# Patient Record
Sex: MALE | Race: BLACK OR AFRICAN AMERICAN | NOT HISPANIC OR LATINO | URBAN - METROPOLITAN AREA
[De-identification: names, ages, dates, MRNs, and addresses within clinical notes are randomized per-mention and may not be internally consistent; named-entity substitution may affect disease eponyms.]

---

## 2017-04-08 ENCOUNTER — EMERGENCY (EMERGENCY)
Facility: HOSPITAL | Age: 62
LOS: 1 days | Discharge: HOME | End: 2017-04-08
Attending: EMERGENCY MEDICINE | Admitting: EMERGENCY MEDICINE
Payer: COMMERCIAL

## 2017-04-08 VITALS
HEART RATE: 56 BPM | SYSTOLIC BLOOD PRESSURE: 185 MMHG | DIASTOLIC BLOOD PRESSURE: 98 MMHG | WEIGHT: 274.92 LBS | TEMPERATURE: 98 F | HEIGHT: 74 IN | OXYGEN SATURATION: 100 % | RESPIRATION RATE: 18 BRPM

## 2017-04-08 DIAGNOSIS — Z94.0 KIDNEY TRANSPLANT STATUS: ICD-10-CM

## 2017-04-08 DIAGNOSIS — Z79.82 LONG TERM (CURRENT) USE OF ASPIRIN: ICD-10-CM

## 2017-04-08 DIAGNOSIS — M79.89 OTHER SPECIFIED SOFT TISSUE DISORDERS: ICD-10-CM

## 2017-04-08 DIAGNOSIS — Z98.890 OTHER SPECIFIED POSTPROCEDURAL STATES: ICD-10-CM

## 2017-04-08 DIAGNOSIS — Z79.899 OTHER LONG TERM (CURRENT) DRUG THERAPY: ICD-10-CM

## 2017-04-08 DIAGNOSIS — Z94.0 KIDNEY TRANSPLANT STATUS: Chronic | ICD-10-CM

## 2017-04-08 DIAGNOSIS — I12.0 HYPERTENSIVE CHRONIC KIDNEY DISEASE WITH STAGE 5 CHRONIC KIDNEY DISEASE OR END STAGE RENAL DISEASE: ICD-10-CM

## 2017-04-08 DIAGNOSIS — E11.9 TYPE 2 DIABETES MELLITUS WITHOUT COMPLICATIONS: ICD-10-CM

## 2017-04-08 DIAGNOSIS — Z98.89 OTHER SPECIFIED POSTPROCEDURAL STATES: Chronic | ICD-10-CM

## 2017-04-08 DIAGNOSIS — N18.6 END STAGE RENAL DISEASE: ICD-10-CM

## 2017-04-08 DIAGNOSIS — Z79.4 LONG TERM (CURRENT) USE OF INSULIN: ICD-10-CM

## 2017-04-08 PROCEDURE — 99284 EMERGENCY DEPT VISIT MOD MDM: CPT | Mod: 25

## 2017-04-08 NOTE — ED ADULT NURSE NOTE - OBJECTIVE STATEMENT
60 y/o male presenting to the ED for "swelling of right arm fistula"; Patient states "fistula has not been used in 6 years due to receiving a kidney transplant in 2012"; Patient states swelling of fistula began months ago but noticed a dramatic increase in size tonight; Fistula appears swollen; positive bruit; Patient denies pain at site; patient denies injury to arm, increase in heavy lifting; Patient hx of blindness in right eye and partial blindness in left eye; Steady gait noted upon walking in; Neuro grossly intact; a&ox3; safety and comfort measures provided; wife at bedside

## 2017-04-08 NOTE — ED ADULT NURSE NOTE - CHIEF COMPLAINT QUOTE
right arm av fistula appears with more swelling than usual; had kidney transplant 2011 and no longer uses fistula; no pain; pt is legally blind

## 2017-04-08 NOTE — ED ADULT NURSE NOTE - PMH
Bell's palsy  2004  Benign prostatic hypertrophy    Diabetic neuropathy associated with type 2 diabetes mellitus    Dyslipidemia    ESRD (end stage renal disease)  s/p renal transplant  Hypertension    Legally blind  blind in right eye and limited vision in left eye  Obesity    Type 2 diabetes mellitus

## 2017-04-08 NOTE — ED ADULT TRIAGE NOTE - CHIEF COMPLAINT QUOTE
right arm av fistula appears with more swelling than usual; had kidney transplant 2011 and no longer uses fistula; no pain; pt is legally blind right arm av fistula appears with more swelling than usual; no thrill; had kidney transplant 2011 and no longer uses fistula; no pain; pt is legally blind

## 2017-04-09 VITALS
OXYGEN SATURATION: 96 % | DIASTOLIC BLOOD PRESSURE: 93 MMHG | TEMPERATURE: 98 F | SYSTOLIC BLOOD PRESSURE: 184 MMHG | HEART RATE: 50 BPM | RESPIRATION RATE: 18 BRPM

## 2017-04-09 LAB
ALBUMIN SERPL ELPH-MCNC: 4 G/DL — SIGNIFICANT CHANGE UP (ref 3.3–5)
ALP SERPL-CCNC: 70 U/L — SIGNIFICANT CHANGE UP (ref 40–120)
ALT FLD-CCNC: 15 U/L RC — SIGNIFICANT CHANGE UP (ref 10–45)
ANION GAP SERPL CALC-SCNC: 11 MMOL/L — SIGNIFICANT CHANGE UP (ref 5–17)
APTT BLD: 29.4 SEC — SIGNIFICANT CHANGE UP (ref 27.5–37.4)
AST SERPL-CCNC: 29 U/L — SIGNIFICANT CHANGE UP (ref 10–40)
BASOPHILS # BLD AUTO: 0 K/UL — SIGNIFICANT CHANGE UP (ref 0–0.2)
BASOPHILS NFR BLD AUTO: 0.4 % — SIGNIFICANT CHANGE UP (ref 0–2)
BILIRUB SERPL-MCNC: 0.2 MG/DL — SIGNIFICANT CHANGE UP (ref 0.2–1.2)
BUN SERPL-MCNC: 22 MG/DL — SIGNIFICANT CHANGE UP (ref 7–23)
CALCIUM SERPL-MCNC: 7.6 MG/DL — LOW (ref 8.4–10.5)
CHLORIDE SERPL-SCNC: 107 MMOL/L — SIGNIFICANT CHANGE UP (ref 96–108)
CO2 SERPL-SCNC: 23 MMOL/L — SIGNIFICANT CHANGE UP (ref 22–31)
CREAT SERPL-MCNC: 1.6 MG/DL — HIGH (ref 0.5–1.3)
EOSINOPHIL # BLD AUTO: 0.1 K/UL — SIGNIFICANT CHANGE UP (ref 0–0.5)
EOSINOPHIL NFR BLD AUTO: 2.6 % — SIGNIFICANT CHANGE UP (ref 0–6)
GLUCOSE SERPL-MCNC: 199 MG/DL — HIGH (ref 70–99)
HCT VFR BLD CALC: 42.2 % — SIGNIFICANT CHANGE UP (ref 39–50)
HGB BLD-MCNC: 12.9 G/DL — LOW (ref 13–17)
INR BLD: 1.07 RATIO — SIGNIFICANT CHANGE UP (ref 0.88–1.16)
LYMPHOCYTES # BLD AUTO: 1 K/UL — SIGNIFICANT CHANGE UP (ref 1–3.3)
LYMPHOCYTES # BLD AUTO: 22.9 % — SIGNIFICANT CHANGE UP (ref 13–44)
MCHC RBC-ENTMCNC: 23.1 PG — LOW (ref 27–34)
MCHC RBC-ENTMCNC: 30.6 GM/DL — LOW (ref 32–36)
MCV RBC AUTO: 75.3 FL — LOW (ref 80–100)
MONOCYTES # BLD AUTO: 0.5 K/UL — SIGNIFICANT CHANGE UP (ref 0–0.9)
MONOCYTES NFR BLD AUTO: 10 % — SIGNIFICANT CHANGE UP (ref 2–14)
NEUTROPHILS # BLD AUTO: 2.9 K/UL — SIGNIFICANT CHANGE UP (ref 1.8–7.4)
NEUTROPHILS NFR BLD AUTO: 64.1 % — SIGNIFICANT CHANGE UP (ref 43–77)
PLATELET # BLD AUTO: 148 K/UL — LOW (ref 150–400)
POTASSIUM SERPL-MCNC: 5.9 MMOL/L — HIGH (ref 3.5–5.3)
POTASSIUM SERPL-SCNC: 5.9 MMOL/L — HIGH (ref 3.5–5.3)
PROT SERPL-MCNC: 6.5 G/DL — SIGNIFICANT CHANGE UP (ref 6–8.3)
PROTHROM AB SERPL-ACNC: 11.7 SEC — SIGNIFICANT CHANGE UP (ref 9.8–12.7)
RBC # BLD: 5.6 M/UL — SIGNIFICANT CHANGE UP (ref 4.2–5.8)
RBC # FLD: 14.4 % — SIGNIFICANT CHANGE UP (ref 10.3–14.5)
SODIUM SERPL-SCNC: 141 MMOL/L — SIGNIFICANT CHANGE UP (ref 135–145)
WBC # BLD: 4.6 K/UL — SIGNIFICANT CHANGE UP (ref 3.8–10.5)
WBC # FLD AUTO: 4.6 K/UL — SIGNIFICANT CHANGE UP (ref 3.8–10.5)

## 2017-04-09 PROCEDURE — 85610 PROTHROMBIN TIME: CPT

## 2017-04-09 PROCEDURE — 99284 EMERGENCY DEPT VISIT MOD MDM: CPT

## 2017-04-09 PROCEDURE — 85730 THROMBOPLASTIN TIME PARTIAL: CPT

## 2017-04-09 PROCEDURE — 85027 COMPLETE CBC AUTOMATED: CPT

## 2017-04-09 PROCEDURE — 80053 COMPREHEN METABOLIC PANEL: CPT

## 2017-04-09 NOTE — ED PROVIDER NOTE - OBJECTIVE STATEMENT
60yo M with fistula not active, last HD 6 years ago, s/p renal transplant 2011. feels that fistula swollen 62yo M with fistula not active, last HD 6 years ago, s/p renal transplant 2011. feels that fistula swollen x2 years, and significantly swollen today. no fever/chills. no weakness in arm. no paresthias. no pain in the arm. swelling extending behind elbow. +firm    ROS: no CP/SOB. no cough. no n/v/d/c. no abd pain. no rash. no bleeding. no urinary complaints. no weakness. no vision changes. no HA. no neck/back pain.     PCP- Dr Perla

## 2017-04-09 NOTE — ED PROVIDER NOTE - PROGRESS NOTE DETAILS
area of swelling on arm has not increased outside drawn line during ED visit. Surgical consult states that since patient is not actively undergoing access of fistula and swelling not increased and patient has no other bleeding he is ok to f/u out patient with out vasc surgery or private surgeon. Patients in bed and comfortable. Lengthy discussion regarding treatment, discharge instructions, and need for follow up. Patient understands and wishes to go home. HELDER Calhoun MD

## 2017-04-09 NOTE — ED PROVIDER NOTE - PHYSICAL EXAMINATION
Gen: NAD, AOx3  Head: NCAT  HEENT: PERRL, oral mucosa moist, normal conjunctiva, neck supple  Lung: CTAB, no respiratory distress  CV: rrr, no murmur, Normal perfusion  Abd: soft, NTND  MSK: firm AV fistula rt UE extending into antecubital fossa, no erythema/warmth. no thrill, +2 radial pulse  Neuro: No focal neurologic deficits  Skin: No rash   Psych: normal affect

## 2017-04-09 NOTE — ED PROVIDER NOTE - ATTENDING CONTRIBUTION TO CARE
patient with swelling of dialysis fistula over 2 mos and increased swelling today. last accessed 6 years prior as patient had renal transplant. no signs of bleeding. not on blood thinners. soft compartments. sensation and neurovascularly intact throughout arm.   plan for vasc surgery consult, reassess.

## 2017-04-11 ENCOUNTER — APPOINTMENT (OUTPATIENT)
Age: 62
End: 2017-04-11

## 2017-04-11 DIAGNOSIS — I72.1 ANEURYSM OF ARTERY OF UPPER EXTREMITY: ICD-10-CM

## 2017-04-12 ENCOUNTER — RESULT REVIEW (OUTPATIENT)
Age: 62
End: 2017-04-12

## 2017-04-12 ENCOUNTER — INPATIENT (INPATIENT)
Facility: HOSPITAL | Age: 62
LOS: 1 days | Discharge: HOME | DRG: 253 | End: 2017-04-14
Attending: SURGERY | Admitting: SURGERY
Payer: COMMERCIAL

## 2017-04-12 VITALS
OXYGEN SATURATION: 100 % | SYSTOLIC BLOOD PRESSURE: 184 MMHG | RESPIRATION RATE: 18 BRPM | DIASTOLIC BLOOD PRESSURE: 93 MMHG | TEMPERATURE: 98 F | HEART RATE: 59 BPM

## 2017-04-12 DIAGNOSIS — Z98.89 OTHER SPECIFIED POSTPROCEDURAL STATES: Chronic | ICD-10-CM

## 2017-04-12 DIAGNOSIS — T82.858A STENOSIS OF OTHER VASCULAR PROSTHETIC DEVICES, IMPLANTS AND GRAFTS, INITIAL ENCOUNTER: ICD-10-CM

## 2017-04-12 DIAGNOSIS — Z94.0 KIDNEY TRANSPLANT STATUS: Chronic | ICD-10-CM

## 2017-04-12 LAB
ALBUMIN SERPL ELPH-MCNC: 3.9 G/DL — SIGNIFICANT CHANGE UP (ref 3.3–5)
ALP SERPL-CCNC: 69 U/L — SIGNIFICANT CHANGE UP (ref 40–120)
ALT FLD-CCNC: 14 U/L RC — SIGNIFICANT CHANGE UP (ref 10–45)
ANION GAP SERPL CALC-SCNC: 15 MMOL/L — SIGNIFICANT CHANGE UP (ref 5–17)
APTT BLD: 28.9 SEC — SIGNIFICANT CHANGE UP (ref 27.5–37.4)
AST SERPL-CCNC: 16 U/L — SIGNIFICANT CHANGE UP (ref 10–40)
BASOPHILS # BLD AUTO: 0 K/UL — SIGNIFICANT CHANGE UP (ref 0–0.2)
BASOPHILS NFR BLD AUTO: 0.4 % — SIGNIFICANT CHANGE UP (ref 0–2)
BILIRUB SERPL-MCNC: 0.3 MG/DL — SIGNIFICANT CHANGE UP (ref 0.2–1.2)
BLD GP AB SCN SERPL QL: NEGATIVE — SIGNIFICANT CHANGE UP
BUN SERPL-MCNC: 27 MG/DL — HIGH (ref 7–23)
CALCIUM SERPL-MCNC: 8.9 MG/DL — SIGNIFICANT CHANGE UP (ref 8.4–10.5)
CHLORIDE SERPL-SCNC: 107 MMOL/L — SIGNIFICANT CHANGE UP (ref 96–108)
CO2 SERPL-SCNC: 22 MMOL/L — SIGNIFICANT CHANGE UP (ref 22–31)
CREAT SERPL-MCNC: 1.79 MG/DL — HIGH (ref 0.5–1.3)
EOSINOPHIL # BLD AUTO: 0.1 K/UL — SIGNIFICANT CHANGE UP (ref 0–0.5)
EOSINOPHIL NFR BLD AUTO: 2.2 % — SIGNIFICANT CHANGE UP (ref 0–6)
GAS PNL BLDV: SIGNIFICANT CHANGE UP
GLUCOSE SERPL-MCNC: 218 MG/DL — HIGH (ref 70–99)
HCT VFR BLD CALC: 38.5 % — LOW (ref 39–50)
HGB BLD-MCNC: 12 G/DL — LOW (ref 13–17)
INR BLD: 1.12 RATIO — SIGNIFICANT CHANGE UP (ref 0.88–1.16)
LYMPHOCYTES # BLD AUTO: 0.9 K/UL — LOW (ref 1–3.3)
LYMPHOCYTES # BLD AUTO: 17.4 % — SIGNIFICANT CHANGE UP (ref 13–44)
MCHC RBC-ENTMCNC: 23.3 PG — LOW (ref 27–34)
MCHC RBC-ENTMCNC: 31.2 GM/DL — LOW (ref 32–36)
MCV RBC AUTO: 74.7 FL — LOW (ref 80–100)
MONOCYTES # BLD AUTO: 0.6 K/UL — SIGNIFICANT CHANGE UP (ref 0–0.9)
MONOCYTES NFR BLD AUTO: 10.9 % — SIGNIFICANT CHANGE UP (ref 2–14)
NEUTROPHILS # BLD AUTO: 3.8 K/UL — SIGNIFICANT CHANGE UP (ref 1.8–7.4)
NEUTROPHILS NFR BLD AUTO: 69.1 % — SIGNIFICANT CHANGE UP (ref 43–77)
PLATELET # BLD AUTO: 144 K/UL — LOW (ref 150–400)
POTASSIUM SERPL-MCNC: 4.2 MMOL/L — SIGNIFICANT CHANGE UP (ref 3.5–5.3)
POTASSIUM SERPL-SCNC: 4.2 MMOL/L — SIGNIFICANT CHANGE UP (ref 3.5–5.3)
PROT SERPL-MCNC: 6.3 G/DL — SIGNIFICANT CHANGE UP (ref 6–8.3)
PROTHROM AB SERPL-ACNC: 12.1 SEC — SIGNIFICANT CHANGE UP (ref 9.8–12.7)
RBC # BLD: 5.15 M/UL — SIGNIFICANT CHANGE UP (ref 4.2–5.8)
RBC # FLD: 14 % — SIGNIFICANT CHANGE UP (ref 10.3–14.5)
RH IG SCN BLD-IMP: POSITIVE — SIGNIFICANT CHANGE UP
SODIUM SERPL-SCNC: 144 MMOL/L — SIGNIFICANT CHANGE UP (ref 135–145)
WBC # BLD: 5.4 K/UL — SIGNIFICANT CHANGE UP (ref 3.8–10.5)
WBC # FLD AUTO: 5.4 K/UL — SIGNIFICANT CHANGE UP (ref 3.8–10.5)

## 2017-04-12 PROCEDURE — 93010 ELECTROCARDIOGRAM REPORT: CPT

## 2017-04-12 PROCEDURE — 71010: CPT | Mod: 26

## 2017-04-12 PROCEDURE — 99285 EMERGENCY DEPT VISIT HI MDM: CPT | Mod: 25

## 2017-04-12 RX ORDER — LOSARTAN POTASSIUM 100 MG/1
25 TABLET, FILM COATED ORAL DAILY
Qty: 0 | Refills: 0 | Status: DISCONTINUED | OUTPATIENT
Start: 2017-04-12 | End: 2017-04-13

## 2017-04-12 RX ORDER — NIFEDIPINE 30 MG
60 TABLET, EXTENDED RELEASE 24 HR ORAL DAILY
Qty: 0 | Refills: 0 | Status: DISCONTINUED | OUTPATIENT
Start: 2017-04-12 | End: 2017-04-13

## 2017-04-12 RX ORDER — DEXTROSE 50 % IN WATER 50 %
12.5 SYRINGE (ML) INTRAVENOUS ONCE
Qty: 0 | Refills: 0 | Status: DISCONTINUED | OUTPATIENT
Start: 2017-04-12 | End: 2017-04-13

## 2017-04-12 RX ORDER — INSULIN LISPRO 100/ML
VIAL (ML) SUBCUTANEOUS
Qty: 0 | Refills: 0 | Status: DISCONTINUED | OUTPATIENT
Start: 2017-04-12 | End: 2017-04-13

## 2017-04-12 RX ORDER — TAMSULOSIN HYDROCHLORIDE 0.4 MG/1
0.4 CAPSULE ORAL
Qty: 0 | Refills: 0 | Status: DISCONTINUED | OUTPATIENT
Start: 2017-04-12 | End: 2017-04-13

## 2017-04-12 RX ORDER — SODIUM CHLORIDE 9 MG/ML
1000 INJECTION, SOLUTION INTRAVENOUS
Qty: 0 | Refills: 0 | Status: DISCONTINUED | OUTPATIENT
Start: 2017-04-12 | End: 2017-04-13

## 2017-04-12 RX ORDER — SODIUM CHLORIDE 9 MG/ML
1000 INJECTION INTRAMUSCULAR; INTRAVENOUS; SUBCUTANEOUS
Qty: 0 | Refills: 0 | Status: DISCONTINUED | OUTPATIENT
Start: 2017-04-12 | End: 2017-04-13

## 2017-04-12 RX ORDER — CALCIUM CARBONATE 500(1250)
1 TABLET ORAL
Qty: 0 | Refills: 0 | Status: DISCONTINUED | OUTPATIENT
Start: 2017-04-12 | End: 2017-04-13

## 2017-04-12 RX ORDER — DEXTROSE 50 % IN WATER 50 %
25 SYRINGE (ML) INTRAVENOUS ONCE
Qty: 0 | Refills: 0 | Status: DISCONTINUED | OUTPATIENT
Start: 2017-04-12 | End: 2017-04-13

## 2017-04-12 RX ORDER — INSULIN DETEMIR 100/ML (3)
10 INSULIN PEN (ML) SUBCUTANEOUS AT BEDTIME
Qty: 0 | Refills: 0 | Status: DISCONTINUED | OUTPATIENT
Start: 2017-04-12 | End: 2017-04-13

## 2017-04-12 RX ORDER — DEXTROSE 50 % IN WATER 50 %
1 SYRINGE (ML) INTRAVENOUS ONCE
Qty: 0 | Refills: 0 | Status: DISCONTINUED | OUTPATIENT
Start: 2017-04-12 | End: 2017-04-13

## 2017-04-12 RX ORDER — METOPROLOL TARTRATE 50 MG
200 TABLET ORAL
Qty: 0 | Refills: 0 | Status: DISCONTINUED | OUTPATIENT
Start: 2017-04-12 | End: 2017-04-13

## 2017-04-12 RX ORDER — DOCUSATE SODIUM 100 MG
100 CAPSULE ORAL
Qty: 0 | Refills: 0 | Status: DISCONTINUED | OUTPATIENT
Start: 2017-04-12 | End: 2017-04-13

## 2017-04-12 RX ORDER — INSULIN LISPRO 100/ML
VIAL (ML) SUBCUTANEOUS AT BEDTIME
Qty: 0 | Refills: 0 | Status: DISCONTINUED | OUTPATIENT
Start: 2017-04-12 | End: 2017-04-13

## 2017-04-12 RX ORDER — MYCOPHENOLATE MOFETIL 250 MG/1
250 CAPSULE ORAL
Qty: 0 | Refills: 0 | Status: DISCONTINUED | OUTPATIENT
Start: 2017-04-12 | End: 2017-04-13

## 2017-04-12 RX ORDER — ASPIRIN/CALCIUM CARB/MAGNESIUM 324 MG
81 TABLET ORAL DAILY
Qty: 0 | Refills: 0 | Status: DISCONTINUED | OUTPATIENT
Start: 2017-04-12 | End: 2017-04-13

## 2017-04-12 RX ORDER — GLUCAGON INJECTION, SOLUTION 0.5 MG/.1ML
1 INJECTION, SOLUTION SUBCUTANEOUS ONCE
Qty: 0 | Refills: 0 | Status: DISCONTINUED | OUTPATIENT
Start: 2017-04-12 | End: 2017-04-13

## 2017-04-12 RX ORDER — HEPARIN SODIUM 5000 [USP'U]/ML
5000 INJECTION INTRAVENOUS; SUBCUTANEOUS EVERY 8 HOURS
Qty: 0 | Refills: 0 | Status: DISCONTINUED | OUTPATIENT
Start: 2017-04-12 | End: 2017-04-13

## 2017-04-12 RX ORDER — TACROLIMUS 5 MG/1
4 CAPSULE ORAL EVERY 12 HOURS
Qty: 0 | Refills: 0 | Status: DISCONTINUED | OUTPATIENT
Start: 2017-04-12 | End: 2017-04-13

## 2017-04-12 RX ADMIN — Medication 1 TABLET(S): at 18:36

## 2017-04-12 RX ADMIN — Medication 1: at 15:25

## 2017-04-12 RX ADMIN — LOSARTAN POTASSIUM 25 MILLIGRAM(S): 100 TABLET, FILM COATED ORAL at 14:08

## 2017-04-12 RX ADMIN — Medication 10 UNIT(S): at 22:51

## 2017-04-12 RX ADMIN — HEPARIN SODIUM 5000 UNIT(S): 5000 INJECTION INTRAVENOUS; SUBCUTANEOUS at 22:49

## 2017-04-12 RX ADMIN — MYCOPHENOLATE MOFETIL 250 MILLIGRAM(S): 250 CAPSULE ORAL at 22:49

## 2017-04-12 RX ADMIN — TACROLIMUS 4 MILLIGRAM(S): 5 CAPSULE ORAL at 22:50

## 2017-04-12 RX ADMIN — Medication 2: at 22:49

## 2017-04-12 RX ADMIN — TAMSULOSIN HYDROCHLORIDE 0.4 MILLIGRAM(S): 0.4 CAPSULE ORAL at 18:36

## 2017-04-12 RX ADMIN — HEPARIN SODIUM 5000 UNIT(S): 5000 INJECTION INTRAVENOUS; SUBCUTANEOUS at 14:13

## 2017-04-12 RX ADMIN — Medication 60 MILLIGRAM(S): at 16:20

## 2017-04-12 RX ADMIN — Medication 100 MILLIGRAM(S): at 18:38

## 2017-04-12 NOTE — PROVIDER CONTACT NOTE (OTHER) - BACKGROUND
Patient admitted 4/12/17 with AV Patient admitted 4/12/17 with AV fistula bp is 175/78 and HR is 55. Patient admitted 4/12/17 with AV fistula swelling. bp is 175/78 and HR is 55.

## 2017-04-12 NOTE — ED PROVIDER NOTE - MUSCULOSKELETAL, MLM
Spine appears normal, range of motion is not limited, no muscle or joint tenderness Spine appears normal, range of motion is not limited, no muscle or joint tenderness Right forearm has diffuse proximally swelling and adjacent to AV fistula Bruit+ no distal neuro vascular deficit-- Fernando

## 2017-04-12 NOTE — ED ADULT NURSE NOTE - OBJECTIVE STATEMENT
60 y/o M, reported to ED from home. A&Ox3, c/o right arm fistula repair. Pt reports that he had a kidney transplant in 2011 and hasn't used the fistula since. Pt reports that he noticed some swelling in the arm for a long time." Pt states that he was here on Saturday night after the swelling extended to his elbow. Pt reports that he was in the ED yesterday and had an ultrasound of the area. Pt was told that the fistula was leaking blood and that he was to come back today to be admitted to the hospital. Pt states that he will have a procedure preformed tomorrow as per his previous ED visit. Pt denies pain at the site. The site is swollen and hard to touch. No redness or warmth noted at the site of the fistula. Pt denies LOC, SOB, C/P, N/V/D, abd pain. Pt denies fever or chills. Pt reports some constipation and hx of legally blind, DM and HTN. Wife at bedside, will continue to monitor pt.

## 2017-04-12 NOTE — PROVIDER CONTACT NOTE (OTHER) - ASSESSMENT
Patient a&ox4. Patient /78 and HR is 55. Patient 98% on room air. Patient denies chest pain or shortness of breath

## 2017-04-12 NOTE — PATIENT PROFILE ADULT. - VISION (WITH CORRECTIVE LENSES IF THE PATIENT USUALLY WEARS THEM):
legally blind/Severely impaired: cannot locate objects without hearing or touching them or patient nonresponsive.

## 2017-04-12 NOTE — H&P ADULT. - ASSESSMENT
61M with RUE brachial pseudoaneurysm  -TTE  -NPO past midnight  -Pre-op  -IVF  -OR on 4/13 for repair of pseudoaneurysm

## 2017-04-12 NOTE — ED PROVIDER NOTE - OBJECTIVE STATEMENT
62 yo m with history of renal tx on cellcept and prograf, HTN, DM presents for admission for repair of right A-V fistula. The patient's AV fistula hasn't been used since 2011 and recently the arm has been swelling up and has had an US which showed an arterial blockage. The patient reports that the swelling has gone down. He does not have any complaints at this time.

## 2017-04-12 NOTE — H&P ADULT. - HISTORY OF PRESENT ILLNESS
61 year old Male presented to ED on 4/8 for increased swelling of 1 week in RUE at site of AVF. Pt was evaluated and told to follow up with Dr. Cardoso. Pt followed up at office and was scheduled for repair of pseudoaneurysm on 4/13 and presented to hospital on 4/12 for preo-op.

## 2017-04-12 NOTE — ED PROVIDER NOTE - ATTENDING CONTRIBUTION TO CARE
I have seen and evaluated this patient with the resident.   I agree with the findings  unless other wise stated.  I have made appropriate changes in documentations where needed, After my face to face bedside evaluation, I am further  noting: Right arm AV fistula for hemodialysis not being used 2/2 renal transplant formation of pseudo aneurysm will admit for repair --Fernando

## 2017-04-12 NOTE — ED PROVIDER NOTE - MEDICAL DECISION MAKING DETAILS
Right arm AV fistula for hemodialysis not being used 2/2 renal transplant formation of pseudo aneurysm will admit for repair --Fernando

## 2017-04-13 ENCOUNTER — APPOINTMENT (OUTPATIENT)
Dept: VASCULAR SURGERY | Facility: HOSPITAL | Age: 62
End: 2017-04-13

## 2017-04-13 LAB
ANION GAP SERPL CALC-SCNC: 13 MMOL/L — SIGNIFICANT CHANGE UP (ref 5–17)
BLD GP AB SCN SERPL QL: NEGATIVE — SIGNIFICANT CHANGE UP
BUN SERPL-MCNC: 20 MG/DL — SIGNIFICANT CHANGE UP (ref 7–23)
CALCIUM SERPL-MCNC: 8.2 MG/DL — LOW (ref 8.4–10.5)
CHLORIDE SERPL-SCNC: 108 MMOL/L — SIGNIFICANT CHANGE UP (ref 96–108)
CO2 SERPL-SCNC: 20 MMOL/L — LOW (ref 22–31)
CREAT SERPL-MCNC: 1.47 MG/DL — HIGH (ref 0.5–1.3)
GLUCOSE SERPL-MCNC: 173 MG/DL — HIGH (ref 70–99)
HBA1C BLD-MCNC: 7.8 % — HIGH (ref 4–5.6)
HCT VFR BLD CALC: 30.5 % — LOW (ref 39–50)
HGB BLD-MCNC: 9.8 G/DL — LOW (ref 13–17)
MCHC RBC-ENTMCNC: 23.9 PG — LOW (ref 27–34)
MCHC RBC-ENTMCNC: 32.2 GM/DL — SIGNIFICANT CHANGE UP (ref 32–36)
MCV RBC AUTO: 74.1 FL — LOW (ref 80–100)
PLATELET # BLD AUTO: 115 K/UL — LOW (ref 150–400)
POTASSIUM SERPL-MCNC: 3.7 MMOL/L — SIGNIFICANT CHANGE UP (ref 3.5–5.3)
POTASSIUM SERPL-SCNC: 3.7 MMOL/L — SIGNIFICANT CHANGE UP (ref 3.5–5.3)
RBC # BLD: 4.12 M/UL — LOW (ref 4.2–5.8)
RBC # FLD: 13.9 % — SIGNIFICANT CHANGE UP (ref 10.3–14.5)
RH IG SCN BLD-IMP: POSITIVE — SIGNIFICANT CHANGE UP
SODIUM SERPL-SCNC: 141 MMOL/L — SIGNIFICANT CHANGE UP (ref 135–145)
TACROLIMUS SERPL-MCNC: 5.4 NG/ML — SIGNIFICANT CHANGE UP
WBC # BLD: 2.9 K/UL — LOW (ref 3.8–10.5)
WBC # FLD AUTO: 2.9 K/UL — LOW (ref 3.8–10.5)

## 2017-04-13 PROCEDURE — 88304 TISSUE EXAM BY PATHOLOGIST: CPT | Mod: 26

## 2017-04-13 PROCEDURE — 93306 TTE W/DOPPLER COMPLETE: CPT | Mod: 26

## 2017-04-13 RX ORDER — LOSARTAN POTASSIUM 100 MG/1
25 TABLET, FILM COATED ORAL DAILY
Qty: 0 | Refills: 0 | Status: DISCONTINUED | OUTPATIENT
Start: 2017-04-13 | End: 2017-04-14

## 2017-04-13 RX ORDER — INSULIN LISPRO 100/ML
VIAL (ML) SUBCUTANEOUS
Qty: 0 | Refills: 0 | Status: DISCONTINUED | OUTPATIENT
Start: 2017-04-13 | End: 2017-04-14

## 2017-04-13 RX ORDER — METOPROLOL TARTRATE 50 MG
200 TABLET ORAL
Qty: 0 | Refills: 0 | Status: DISCONTINUED | OUTPATIENT
Start: 2017-04-13 | End: 2017-04-14

## 2017-04-13 RX ORDER — MYCOPHENOLATE MOFETIL 250 MG/1
250 CAPSULE ORAL
Qty: 0 | Refills: 0 | Status: DISCONTINUED | OUTPATIENT
Start: 2017-04-13 | End: 2017-04-14

## 2017-04-13 RX ORDER — DEXTROSE 50 % IN WATER 50 %
25 SYRINGE (ML) INTRAVENOUS ONCE
Qty: 0 | Refills: 0 | Status: DISCONTINUED | OUTPATIENT
Start: 2017-04-13 | End: 2017-04-14

## 2017-04-13 RX ORDER — DEXTROSE 50 % IN WATER 50 %
1 SYRINGE (ML) INTRAVENOUS ONCE
Qty: 0 | Refills: 0 | Status: DISCONTINUED | OUTPATIENT
Start: 2017-04-13 | End: 2017-04-13

## 2017-04-13 RX ORDER — INSULIN LISPRO 100/ML
VIAL (ML) SUBCUTANEOUS AT BEDTIME
Qty: 0 | Refills: 0 | Status: DISCONTINUED | OUTPATIENT
Start: 2017-04-13 | End: 2017-04-13

## 2017-04-13 RX ORDER — TACROLIMUS 5 MG/1
4 CAPSULE ORAL EVERY 12 HOURS
Qty: 0 | Refills: 0 | Status: DISCONTINUED | OUTPATIENT
Start: 2017-04-13 | End: 2017-04-14

## 2017-04-13 RX ORDER — INSULIN GLARGINE 100 [IU]/ML
10 INJECTION, SOLUTION SUBCUTANEOUS AT BEDTIME
Qty: 0 | Refills: 0 | Status: DISCONTINUED | OUTPATIENT
Start: 2017-04-13 | End: 2017-04-13

## 2017-04-13 RX ORDER — DEXTROSE 50 % IN WATER 50 %
12.5 SYRINGE (ML) INTRAVENOUS ONCE
Qty: 0 | Refills: 0 | Status: DISCONTINUED | OUTPATIENT
Start: 2017-04-13 | End: 2017-04-13

## 2017-04-13 RX ORDER — DEXTROSE 50 % IN WATER 50 %
25 SYRINGE (ML) INTRAVENOUS ONCE
Qty: 0 | Refills: 0 | Status: DISCONTINUED | OUTPATIENT
Start: 2017-04-13 | End: 2017-04-13

## 2017-04-13 RX ORDER — INSULIN GLARGINE 100 [IU]/ML
10 INJECTION, SOLUTION SUBCUTANEOUS AT BEDTIME
Qty: 0 | Refills: 0 | Status: DISCONTINUED | OUTPATIENT
Start: 2017-04-13 | End: 2017-04-14

## 2017-04-13 RX ORDER — INSULIN LISPRO 100/ML
VIAL (ML) SUBCUTANEOUS
Qty: 0 | Refills: 0 | Status: DISCONTINUED | OUTPATIENT
Start: 2017-04-13 | End: 2017-04-13

## 2017-04-13 RX ORDER — TAMSULOSIN HYDROCHLORIDE 0.4 MG/1
0.4 CAPSULE ORAL
Qty: 0 | Refills: 0 | Status: DISCONTINUED | OUTPATIENT
Start: 2017-04-13 | End: 2017-04-14

## 2017-04-13 RX ORDER — SODIUM CHLORIDE 9 MG/ML
1000 INJECTION, SOLUTION INTRAVENOUS
Qty: 0 | Refills: 0 | Status: DISCONTINUED | OUTPATIENT
Start: 2017-04-13 | End: 2017-04-14

## 2017-04-13 RX ORDER — DEXTROSE 50 % IN WATER 50 %
1 SYRINGE (ML) INTRAVENOUS ONCE
Qty: 0 | Refills: 0 | Status: DISCONTINUED | OUTPATIENT
Start: 2017-04-13 | End: 2017-04-14

## 2017-04-13 RX ORDER — GLUCAGON INJECTION, SOLUTION 0.5 MG/.1ML
1 INJECTION, SOLUTION SUBCUTANEOUS ONCE
Qty: 0 | Refills: 0 | Status: DISCONTINUED | OUTPATIENT
Start: 2017-04-13 | End: 2017-04-14

## 2017-04-13 RX ORDER — HEPARIN SODIUM 5000 [USP'U]/ML
5000 INJECTION INTRAVENOUS; SUBCUTANEOUS EVERY 8 HOURS
Qty: 0 | Refills: 0 | Status: DISCONTINUED | OUTPATIENT
Start: 2017-04-13 | End: 2017-04-14

## 2017-04-13 RX ORDER — INSULIN LISPRO 100/ML
VIAL (ML) SUBCUTANEOUS AT BEDTIME
Qty: 0 | Refills: 0 | Status: DISCONTINUED | OUTPATIENT
Start: 2017-04-13 | End: 2017-04-14

## 2017-04-13 RX ORDER — NIFEDIPINE 30 MG
60 TABLET, EXTENDED RELEASE 24 HR ORAL DAILY
Qty: 0 | Refills: 0 | Status: DISCONTINUED | OUTPATIENT
Start: 2017-04-13 | End: 2017-04-14

## 2017-04-13 RX ORDER — SODIUM CHLORIDE 9 MG/ML
1000 INJECTION INTRAMUSCULAR; INTRAVENOUS; SUBCUTANEOUS
Qty: 0 | Refills: 0 | Status: DISCONTINUED | OUTPATIENT
Start: 2017-04-13 | End: 2017-04-13

## 2017-04-13 RX ORDER — INSULIN DETEMIR 100/ML (3)
10 INSULIN PEN (ML) SUBCUTANEOUS AT BEDTIME
Qty: 0 | Refills: 0 | Status: DISCONTINUED | OUTPATIENT
Start: 2017-04-13 | End: 2017-04-13

## 2017-04-13 RX ORDER — DOCUSATE SODIUM 100 MG
100 CAPSULE ORAL
Qty: 0 | Refills: 0 | Status: DISCONTINUED | OUTPATIENT
Start: 2017-04-13 | End: 2017-04-14

## 2017-04-13 RX ORDER — DEXTROSE 50 % IN WATER 50 %
12.5 SYRINGE (ML) INTRAVENOUS ONCE
Qty: 0 | Refills: 0 | Status: DISCONTINUED | OUTPATIENT
Start: 2017-04-13 | End: 2017-04-14

## 2017-04-13 RX ORDER — GLUCAGON INJECTION, SOLUTION 0.5 MG/.1ML
1 INJECTION, SOLUTION SUBCUTANEOUS ONCE
Qty: 0 | Refills: 0 | Status: DISCONTINUED | OUTPATIENT
Start: 2017-04-13 | End: 2017-04-13

## 2017-04-13 RX ORDER — CALCIUM CARBONATE 500(1250)
1 TABLET ORAL
Qty: 0 | Refills: 0 | Status: DISCONTINUED | OUTPATIENT
Start: 2017-04-13 | End: 2017-04-14

## 2017-04-13 RX ORDER — ASPIRIN/CALCIUM CARB/MAGNESIUM 324 MG
81 TABLET ORAL DAILY
Qty: 0 | Refills: 0 | Status: DISCONTINUED | OUTPATIENT
Start: 2017-04-13 | End: 2017-04-14

## 2017-04-13 RX ADMIN — HEPARIN SODIUM 5000 UNIT(S): 5000 INJECTION INTRAVENOUS; SUBCUTANEOUS at 21:24

## 2017-04-13 RX ADMIN — Medication 1 TABLET(S): at 18:23

## 2017-04-13 RX ADMIN — Medication 200 MILLIGRAM(S): at 18:22

## 2017-04-13 RX ADMIN — Medication 5 MILLIGRAM(S): at 06:39

## 2017-04-13 RX ADMIN — SODIUM CHLORIDE 30 MILLILITER(S): 9 INJECTION INTRAMUSCULAR; INTRAVENOUS; SUBCUTANEOUS at 16:39

## 2017-04-13 RX ADMIN — TAMSULOSIN HYDROCHLORIDE 0.4 MILLIGRAM(S): 0.4 CAPSULE ORAL at 18:24

## 2017-04-13 RX ADMIN — TACROLIMUS 4 MILLIGRAM(S): 5 CAPSULE ORAL at 18:22

## 2017-04-13 RX ADMIN — TAMSULOSIN HYDROCHLORIDE 0.4 MILLIGRAM(S): 0.4 CAPSULE ORAL at 06:39

## 2017-04-13 RX ADMIN — INSULIN GLARGINE 10 UNIT(S): 100 INJECTION, SOLUTION SUBCUTANEOUS at 21:24

## 2017-04-13 RX ADMIN — MYCOPHENOLATE MOFETIL 250 MILLIGRAM(S): 250 CAPSULE ORAL at 10:45

## 2017-04-13 RX ADMIN — Medication 100 MILLIGRAM(S): at 18:22

## 2017-04-13 RX ADMIN — LOSARTAN POTASSIUM 25 MILLIGRAM(S): 100 TABLET, FILM COATED ORAL at 06:39

## 2017-04-13 RX ADMIN — MYCOPHENOLATE MOFETIL 250 MILLIGRAM(S): 250 CAPSULE ORAL at 18:22

## 2017-04-13 RX ADMIN — HEPARIN SODIUM 5000 UNIT(S): 5000 INJECTION INTRAVENOUS; SUBCUTANEOUS at 06:39

## 2017-04-13 RX ADMIN — Medication 2: at 21:25

## 2017-04-13 RX ADMIN — Medication 2: at 18:23

## 2017-04-13 RX ADMIN — Medication 81 MILLIGRAM(S): at 18:23

## 2017-04-13 RX ADMIN — LOSARTAN POTASSIUM 25 MILLIGRAM(S): 100 TABLET, FILM COATED ORAL at 18:22

## 2017-04-13 RX ADMIN — Medication 1: at 06:39

## 2017-04-13 RX ADMIN — Medication 1 TABLET(S): at 06:39

## 2017-04-13 RX ADMIN — Medication 100 MILLIGRAM(S): at 06:39

## 2017-04-14 ENCOUNTER — TRANSCRIPTION ENCOUNTER (OUTPATIENT)
Age: 62
End: 2017-04-14

## 2017-04-14 VITALS
OXYGEN SATURATION: 99 % | DIASTOLIC BLOOD PRESSURE: 86 MMHG | HEART RATE: 55 BPM | TEMPERATURE: 97 F | SYSTOLIC BLOOD PRESSURE: 148 MMHG | RESPIRATION RATE: 18 BRPM

## 2017-04-14 LAB
ANION GAP SERPL CALC-SCNC: 13 MMOL/L — SIGNIFICANT CHANGE UP (ref 5–17)
BUN SERPL-MCNC: 24 MG/DL — HIGH (ref 7–23)
CALCIUM SERPL-MCNC: 9 MG/DL — SIGNIFICANT CHANGE UP (ref 8.4–10.5)
CHLORIDE SERPL-SCNC: 102 MMOL/L — SIGNIFICANT CHANGE UP (ref 96–108)
CO2 SERPL-SCNC: 23 MMOL/L — SIGNIFICANT CHANGE UP (ref 22–31)
CREAT SERPL-MCNC: 1.68 MG/DL — HIGH (ref 0.5–1.3)
GLUCOSE SERPL-MCNC: 269 MG/DL — HIGH (ref 70–99)
HCT VFR BLD CALC: 39.2 % — SIGNIFICANT CHANGE UP (ref 39–50)
HGB BLD-MCNC: 12.1 G/DL — LOW (ref 13–17)
MCHC RBC-ENTMCNC: 22.9 PG — LOW (ref 27–34)
MCHC RBC-ENTMCNC: 30.9 GM/DL — LOW (ref 32–36)
MCV RBC AUTO: 74.1 FL — LOW (ref 80–100)
PLATELET # BLD AUTO: 149 K/UL — LOW (ref 150–400)
POTASSIUM SERPL-MCNC: 4.4 MMOL/L — SIGNIFICANT CHANGE UP (ref 3.5–5.3)
POTASSIUM SERPL-SCNC: 4.4 MMOL/L — SIGNIFICANT CHANGE UP (ref 3.5–5.3)
RBC # BLD: 5.28 M/UL — SIGNIFICANT CHANGE UP (ref 4.2–5.8)
RBC # FLD: 13.9 % — SIGNIFICANT CHANGE UP (ref 10.3–14.5)
SODIUM SERPL-SCNC: 138 MMOL/L — SIGNIFICANT CHANGE UP (ref 135–145)
TACROLIMUS SERPL-MCNC: 5 NG/ML — SIGNIFICANT CHANGE UP
WBC # BLD: 5.6 K/UL — SIGNIFICANT CHANGE UP (ref 3.8–10.5)
WBC # FLD AUTO: 5.6 K/UL — SIGNIFICANT CHANGE UP (ref 3.8–10.5)

## 2017-04-14 PROCEDURE — 82803 BLOOD GASES ANY COMBINATION: CPT

## 2017-04-14 PROCEDURE — 71045 X-RAY EXAM CHEST 1 VIEW: CPT

## 2017-04-14 PROCEDURE — 84132 ASSAY OF SERUM POTASSIUM: CPT

## 2017-04-14 PROCEDURE — C1768: CPT

## 2017-04-14 PROCEDURE — 80197 ASSAY OF TACROLIMUS: CPT

## 2017-04-14 PROCEDURE — 86900 BLOOD TYPING SEROLOGIC ABO: CPT

## 2017-04-14 PROCEDURE — C1757: CPT

## 2017-04-14 PROCEDURE — 86850 RBC ANTIBODY SCREEN: CPT

## 2017-04-14 PROCEDURE — 85730 THROMBOPLASTIN TIME PARTIAL: CPT

## 2017-04-14 PROCEDURE — 82947 ASSAY GLUCOSE BLOOD QUANT: CPT

## 2017-04-14 PROCEDURE — 93306 TTE W/DOPPLER COMPLETE: CPT

## 2017-04-14 PROCEDURE — 83036 HEMOGLOBIN GLYCOSYLATED A1C: CPT

## 2017-04-14 PROCEDURE — 80053 COMPREHEN METABOLIC PANEL: CPT

## 2017-04-14 PROCEDURE — 99285 EMERGENCY DEPT VISIT HI MDM: CPT | Mod: 25

## 2017-04-14 PROCEDURE — 80048 BASIC METABOLIC PNL TOTAL CA: CPT

## 2017-04-14 PROCEDURE — 82435 ASSAY OF BLOOD CHLORIDE: CPT

## 2017-04-14 PROCEDURE — 84295 ASSAY OF SERUM SODIUM: CPT

## 2017-04-14 PROCEDURE — 83605 ASSAY OF LACTIC ACID: CPT

## 2017-04-14 PROCEDURE — 82330 ASSAY OF CALCIUM: CPT

## 2017-04-14 PROCEDURE — 85027 COMPLETE CBC AUTOMATED: CPT

## 2017-04-14 PROCEDURE — 93005 ELECTROCARDIOGRAM TRACING: CPT

## 2017-04-14 PROCEDURE — 88304 TISSUE EXAM BY PATHOLOGIST: CPT

## 2017-04-14 PROCEDURE — 85014 HEMATOCRIT: CPT

## 2017-04-14 PROCEDURE — C1889: CPT

## 2017-04-14 PROCEDURE — 85610 PROTHROMBIN TIME: CPT

## 2017-04-14 PROCEDURE — 86901 BLOOD TYPING SEROLOGIC RH(D): CPT

## 2017-04-14 RX ORDER — OXYCODONE HYDROCHLORIDE 5 MG/1
1 TABLET ORAL
Qty: 12 | Refills: 0 | OUTPATIENT
Start: 2017-04-14 | End: 2017-04-17

## 2017-04-14 RX ORDER — FUROSEMIDE 40 MG
20 TABLET ORAL
Qty: 0 | Refills: 0 | Status: DISCONTINUED | OUTPATIENT
Start: 2017-04-14 | End: 2017-04-14

## 2017-04-14 RX ORDER — TAMSULOSIN HYDROCHLORIDE 0.4 MG/1
1 CAPSULE ORAL
Qty: 0 | Refills: 0 | COMMUNITY
Start: 2017-04-14

## 2017-04-14 RX ADMIN — Medication 81 MILLIGRAM(S): at 12:02

## 2017-04-14 RX ADMIN — Medication 8: at 12:01

## 2017-04-14 RX ADMIN — MYCOPHENOLATE MOFETIL 250 MILLIGRAM(S): 250 CAPSULE ORAL at 05:23

## 2017-04-14 RX ADMIN — Medication 2: at 16:47

## 2017-04-14 RX ADMIN — Medication 200 MILLIGRAM(S): at 08:45

## 2017-04-14 RX ADMIN — Medication 100 MILLIGRAM(S): at 05:22

## 2017-04-14 RX ADMIN — HEPARIN SODIUM 5000 UNIT(S): 5000 INJECTION INTRAVENOUS; SUBCUTANEOUS at 05:22

## 2017-04-14 RX ADMIN — Medication 4: at 07:59

## 2017-04-14 RX ADMIN — Medication 1 TABLET(S): at 05:22

## 2017-04-14 RX ADMIN — TAMSULOSIN HYDROCHLORIDE 0.4 MILLIGRAM(S): 0.4 CAPSULE ORAL at 05:23

## 2017-04-14 RX ADMIN — LOSARTAN POTASSIUM 25 MILLIGRAM(S): 100 TABLET, FILM COATED ORAL at 05:22

## 2017-04-14 RX ADMIN — Medication 60 MILLIGRAM(S): at 08:45

## 2017-04-14 RX ADMIN — TACROLIMUS 4 MILLIGRAM(S): 5 CAPSULE ORAL at 10:28

## 2017-04-14 RX ADMIN — Medication 5 MILLIGRAM(S): at 05:23

## 2017-04-14 NOTE — DISCHARGE NOTE ADULT - PLAN OF CARE
Please call for follow-up appointment with  Dr. Cardoso in Healing Please call for follow-up appointment with Dr. Hurley at his office on Monday 4/17. You may call [phone] to schedule an appointment. Please call for follow-up appointment with Dr. Hurley at his office on Monday 4/17. You may call [phone] to schedule an appointment.    Wound care is dry gauze with tape

## 2017-04-14 NOTE — DISCHARGE NOTE ADULT - CARE PLAN
Instructions for follow-up, activity and diet:	Please call for follow-up appointment with  Dr. Cardoso in Principal Discharge DX:	Pseudoaneurysm  Goal:	Healing  Instructions for follow-up, activity and diet:	Please call for follow-up appointment with Dr. Hurlye at his office on Monday 4/17. You may call [phone] to schedule an appointment. Principal Discharge DX:	Pseudoaneurysm  Goal:	Healing  Instructions for follow-up, activity and diet:	Please call for follow-up appointment with Dr. Hurley at his office on Monday 4/17. You may call [phone] to schedule an appointment. Principal Discharge DX:	Pseudoaneurysm  Goal:	Healing  Instructions for follow-up, activity and diet:	Please call for follow-up appointment with Dr. Hurley at his office on Monday 4/17. You may call [phone] to schedule an appointment.    Wound care is dry gauze with tape Principal Discharge DX:	Pseudoaneurysm  Goal:	Healing  Instructions for follow-up, activity and diet:	Please call for follow-up appointment with Dr. Hurley at his office on Monday 4/17. You may call [phone] to schedule an appointment.    Wound care is dry gauze with tape Principal Discharge DX:	Pseudoaneurysm  Goal:	Healing  Instructions for follow-up, activity and diet:	Please call for follow-up appointment with Dr. Hurley at his office on Monday 4/17. You may call [phone] to schedule an appointment.    Wound care is dry gauze with tape Principal Discharge DX:	Pseudoaneurysm  Goal:	Healing  Instructions for follow-up, activity and diet:	Please call for follow-up appointment with Dr. Hurley at his office on Monday 4/17. You may call [phone] to schedule an appointment.    Wound care is dry gauze with tape Principal Discharge DX:	Pseudoaneurysm  Goal:	Healing  Instructions for follow-up, activity and diet:	Please call for follow-up appointment with Dr. Hurley at his office on Monday 4/17. You may call [phone] to schedule an appointment.    Wound care is dry gauze with tape

## 2017-04-14 NOTE — DISCHARGE NOTE ADULT - ADDITIONAL INSTRUCTIONS
Resume normal diet and activity. Avoid straining, exercise, or heavy lifting.    Take medications as instructed by prescriptions.    Monitor and clean drains as instructed.    Shower/rinse with warm/soapy water, pat dry (NO scrubbing or rubbing).    Do not raise arms above head.    Call 911 and return to the ED for chest pain, shortness of breath, significant increase in pain, or significant change in color of surgical sites.

## 2017-04-14 NOTE — DISCHARGE NOTE ADULT - CARE PROVIDER_API CALL
Abner Hurley), Surgery; Vascular Surgery  [address]  [place] [address]  Phone: [phone]  Fax: [phone]

## 2017-04-14 NOTE — DISCHARGE NOTE ADULT - INSTRUCTIONS
Consistent Carbohydrate Diet If unable to tolerate diet, nausea, vomiting, fever above 100.3, chills, or an inrease in pain, notify provider or return to ER.

## 2017-04-14 NOTE — DISCHARGE NOTE ADULT - HOSPITAL COURSE
61 year old Male presented to ED on 4/8 for increased swelling of 1 week in RUE at site of AVF. Pt was evaluated and told to follow up with Dr. Cardoso. Pt followed up at office and was scheduled for repair of pseudoaneurysm on 4/13 and presented to hospital on 4/12 for preo-op.   Pt had a pseudoaneurysm repair on 4/13. LATONYA drain was left in place. Pt tolerated procedure well. On the night after the procedure the pt had some bleeding from the surgical site which was controlled with pressure held for 30 minutes. In the morning after the incisions were clean and dry. Pt had palpable radial pulse on side of procedure. At time of discharge patient was tolerated diet, ambulating, and was set up with VNS for care of LATONYA drain at home.

## 2017-04-14 NOTE — DISCHARGE NOTE ADULT - PATIENT PORTAL LINK FT
“You can access the Follow[facility] Patient Portal, offered by [facility], by registering with the following website: [email or web address]”

## 2017-04-14 NOTE — DISCHARGE NOTE ADULT - CARE PROVIDERS DIRECT ADDRESSES
[email or web address]

## 2017-04-14 NOTE — DISCHARGE NOTE ADULT - HOME CARE AGENCY
[facility], [phone], to start visit the day after discharge, for RN EVAL, post-op wound care and ff up, Reinforce LATONYA drain teaching

## 2017-04-14 NOTE — DISCHARGE NOTE ADULT - MEDICATION SUMMARY - MEDICATIONS TO TAKE
I will START or STAY ON the medications listed below when I get home from the hospital:    predniSONE 5 mg oral tablet  -- 1 tab(s) by mouth once a day  -- Indication: For renal transplant    acetaminophen-oxyCODONE 325 mg-5 mg oral tablet  -- 1 tab(s) by mouth every 6 hours, As needed, Moderate Pain (4 - 6) Maximum Daily Dose 4 MDD:Maximum Daily Dose 4 MDD:4  -- Indication: For Post op pain    aspirin 81 mg oral delayed release tablet  -- 1 tab(s) by mouth once a day  -- Indication: For antiplatelet    losartan 25 mg oral tablet  -- 1 tab(s) by mouth once a day  -- Indication: For Hypettension    Oysco 500 (1250 mg calcium carbonate) oral tablet  -- 1 tab(s) by mouth 2 times a day  -- Indication: For Supplement    tamsulosin 0.4 mg oral capsule  -- 1 cap(s) by mouth 2 times a day  -- Indication: For s/p TURP    tamsulosin 0.4 mg oral capsule  -- 1 cap(s) by mouth 2 times a day  -- Indication: For s/p TURP    NovoLOG FlexPen 100 units/mL subcutaneous solution  --  subcutaneous 3 times a day (before meals) on sliding scale, usually 9-10 units  -- Indication: For diabetes    Levemir FlexTouch 100 units/mL subcutaneous solution  --  subcutaneous once a day (at bedtime) 10 units if FS<150, up to 20 units depending on FS sliding scale  -- Indication: For diabetes    metoprolol tartrate 100 mg oral tablet  -- 2 tab(s) by mouth 2 times a day  -- Indication: For hypertension    Nifedical XL 60 mg oral tablet, extended release  -- 1 tab(s) by mouth once a day  -- Indication: For hypertension    furosemide 20 mg oral tablet  -- 1 tab(s) by mouth 2 times a day  -- Indication: For diuretic    mycophenolate mofetil 250 mg oral capsule  -- 1 cap(s) by mouth 2 times a day  -- at 9am and 9pm  -- Indication: For S/p kidney transplant    tacrolimus 1 mg oral capsule  -- 4 cap(s) by mouth every 12 hours  -- at 9am and 9pm  -- Indication: For S/p kidney transplant    docusate sodium 100 mg oral capsule  -- 1 cap(s) by mouth 2 times a day  -- Indication: For constipation

## 2017-04-14 NOTE — DISCHARGE NOTE ADULT - NS AS ACTIVITY OBS
Walking-Outdoors allowed/Walking-Indoors allowed/Showering allowed/Stairs allowed/No Heavy lifting/straining

## 2017-04-14 NOTE — DISCHARGE NOTE ADULT - CONDITIONS AT DISCHARGE
pt alert and oriented.pt with right arm guaze and tape and jpx1 and with right arm sling.pt vital signs WNL. pt IV remove and site remain WNL. pt discharge home.

## 2017-04-19 LAB — SURGICAL PATHOLOGY STUDY: SIGNIFICANT CHANGE UP

## 2017-04-21 ENCOUNTER — APPOINTMENT (OUTPATIENT)
Dept: VASCULAR SURGERY | Facility: CLINIC | Age: 62
End: 2017-04-21

## 2017-04-21 VITALS
BODY MASS INDEX: 35.29 KG/M2 | HEIGHT: 74 IN | DIASTOLIC BLOOD PRESSURE: 70 MMHG | RESPIRATION RATE: 16 BRPM | SYSTOLIC BLOOD PRESSURE: 142 MMHG | WEIGHT: 275 LBS | HEART RATE: 66 BPM | TEMPERATURE: 98.2 F

## 2017-04-28 ENCOUNTER — APPOINTMENT (OUTPATIENT)
Dept: VASCULAR SURGERY | Facility: CLINIC | Age: 62
End: 2017-04-28

## 2017-04-28 VITALS
RESPIRATION RATE: 16 BRPM | DIASTOLIC BLOOD PRESSURE: 60 MMHG | WEIGHT: 275 LBS | HEIGHT: 74 IN | TEMPERATURE: 98.4 F | SYSTOLIC BLOOD PRESSURE: 160 MMHG | HEART RATE: 60 BPM | BODY MASS INDEX: 35.29 KG/M2

## 2017-04-28 DIAGNOSIS — Z86.39 PERSONAL HISTORY OF OTHER ENDOCRINE, NUTRITIONAL AND METABOLIC DISEASE: ICD-10-CM

## 2017-04-28 DIAGNOSIS — Z87.891 PERSONAL HISTORY OF NICOTINE DEPENDENCE: ICD-10-CM

## 2017-04-28 DIAGNOSIS — Z79.4 OTHER SPECIFIED DIABETES MELLITUS WITH HYPEROSMOLARITY W/OUT NONKETOTIC HYPERGLYCEMIC-HYPEROSMOLAR COMA (NKHHC): ICD-10-CM

## 2017-04-28 DIAGNOSIS — Z86.69 PERSONAL HISTORY OF OTHER DISEASES OF THE NERVOUS SYSTEM AND SENSE ORGANS: ICD-10-CM

## 2017-04-28 DIAGNOSIS — Z87.448 PERSONAL HISTORY OF OTHER DISEASES OF URINARY SYSTEM: ICD-10-CM

## 2017-04-28 DIAGNOSIS — E13.00 OTHER SPECIFIED DIABETES MELLITUS WITH HYPEROSMOLARITY W/OUT NONKETOTIC HYPERGLYCEMIC-HYPEROSMOLAR COMA (NKHHC): ICD-10-CM

## 2017-05-08 PROBLEM — E13.00 DIABETES MELLITUS OF OTHER TYPE WITH HYPEROSMOLARITY, WITH LONG-TERM CURRENT USE OF INSULIN: Status: RESOLVED | Noted: 2017-04-21 | Resolved: 2017-05-08

## 2017-05-08 PROBLEM — Z86.69 HISTORY OF CATARACT: Status: RESOLVED | Noted: 2017-04-21 | Resolved: 2017-05-08

## 2017-05-08 PROBLEM — Z86.39 HISTORY OF DIABETES MELLITUS: Status: RESOLVED | Noted: 2017-04-21 | Resolved: 2017-05-08

## 2017-05-08 PROBLEM — Z87.448 HISTORY OF KIDNEY DISEASE: Status: RESOLVED | Noted: 2017-04-21 | Resolved: 2017-05-08

## 2017-05-08 PROBLEM — Z87.891 FORMER SMOKER: Status: ACTIVE | Noted: 2017-05-08

## 2017-05-08 RX ORDER — ASPIRIN 81 MG
81 TABLET, DELAYED RELEASE (ENTERIC COATED) ORAL
Refills: 0 | Status: ACTIVE | COMMUNITY

## 2017-05-08 RX ORDER — NIFEDIPINE 60 MG/1
60 TABLET, FILM COATED, EXTENDED RELEASE ORAL
Refills: 0 | Status: ACTIVE | COMMUNITY

## 2017-05-08 RX ORDER — INSULIN ASPART 100 [IU]/ML
INJECTION, SOLUTION INTRAVENOUS; SUBCUTANEOUS
Refills: 0 | Status: ACTIVE | COMMUNITY

## 2017-05-19 ENCOUNTER — APPOINTMENT (OUTPATIENT)
Dept: VASCULAR SURGERY | Facility: CLINIC | Age: 62
End: 2017-05-19

## 2017-11-03 NOTE — DISCHARGE NOTE ADULT - FUNCTIONAL SCREEN CURRENT LEVEL: BATHING, MLM
(2) assistive person Scribe Attestation (For Scribes USE Only)... Scribe Attestation (For Scribes USE Only).../Attending Attestation (For Attendings USE Only)...

## 2017-11-10 ENCOUNTER — APPOINTMENT (OUTPATIENT)
Dept: VASCULAR SURGERY | Facility: CLINIC | Age: 62
End: 2017-11-10
Payer: MEDICARE

## 2017-11-10 PROCEDURE — 99214 OFFICE O/P EST MOD 30 MIN: CPT

## 2017-11-10 PROCEDURE — 93931 UPPER EXTREMITY STUDY: CPT

## 2017-11-10 RX ORDER — TAMSULOSIN HYDROCHLORIDE 0.4 MG/1
0.4 CAPSULE ORAL
Qty: 120 | Refills: 0 | Status: ACTIVE | COMMUNITY
Start: 2016-07-22

## 2017-11-10 RX ORDER — INSULIN ASPART 100 [IU]/ML
100 INJECTION, SOLUTION INTRAVENOUS; SUBCUTANEOUS
Qty: 15 | Refills: 0 | Status: ACTIVE | COMMUNITY
Start: 2017-03-03

## 2017-11-10 RX ORDER — LANCETS 33 GAUGE
31G X 5 MM EACH MISCELLANEOUS
Qty: 200 | Refills: 0 | Status: ACTIVE | COMMUNITY
Start: 2017-03-03

## 2017-11-10 RX ORDER — BLOOD SUGAR DIAGNOSTIC
STRIP MISCELLANEOUS
Qty: 50 | Refills: 0 | Status: ACTIVE | COMMUNITY
Start: 2017-01-27

## 2017-11-10 RX ORDER — INSULIN DETEMIR 100 [IU]/ML
100 INJECTION, SOLUTION SUBCUTANEOUS
Qty: 15 | Refills: 0 | Status: ACTIVE | COMMUNITY
Start: 2017-03-03

## 2018-01-03 ENCOUNTER — APPOINTMENT (OUTPATIENT)
Dept: NEPHROLOGY | Facility: CLINIC | Age: 63
End: 2018-01-03
Payer: MEDICARE

## 2018-01-03 ENCOUNTER — LABORATORY RESULT (OUTPATIENT)
Age: 63
End: 2018-01-03

## 2018-01-03 VITALS
OXYGEN SATURATION: 97 % | HEART RATE: 55 BPM | HEIGHT: 74 IN | DIASTOLIC BLOOD PRESSURE: 96 MMHG | SYSTOLIC BLOOD PRESSURE: 187 MMHG | BODY MASS INDEX: 36.87 KG/M2 | WEIGHT: 287.26 LBS

## 2018-01-03 PROCEDURE — 99204 OFFICE O/P NEW MOD 45 MIN: CPT | Mod: GC

## 2018-01-03 RX ORDER — TACROLIMUS 0.5 MG/1
CAPSULE, GELATIN COATED ORAL
Refills: 0 | Status: DISCONTINUED | COMMUNITY
End: 2018-01-03

## 2018-01-03 RX ORDER — METOPROLOL SUCCINATE 100 MG/1
100 TABLET, EXTENDED RELEASE ORAL
Refills: 0 | Status: DISCONTINUED | COMMUNITY
End: 2018-01-03

## 2018-01-03 RX ORDER — DOCUSATE SODIUM 100 MG/1
100 CAPSULE, LIQUID FILLED ORAL TWICE DAILY
Refills: 0 | Status: ACTIVE | COMMUNITY
Start: 2018-01-03

## 2018-01-04 ENCOUNTER — MOBILE ON CALL (OUTPATIENT)
Age: 63
End: 2018-01-04

## 2018-01-05 LAB
ALBUMIN SERPL ELPH-MCNC: 4.3 G/DL
ANION GAP SERPL CALC-SCNC: 13 MMOL/L
APPEARANCE: CLEAR
BACTERIA: NEGATIVE
BASOPHILS # BLD AUTO: 0.01 K/UL
BASOPHILS NFR BLD AUTO: 0.2 %
BILIRUBIN URINE: NEGATIVE
BLOOD URINE: ABNORMAL
BUN SERPL-MCNC: 26 MG/DL
CALCIUM SERPL-MCNC: 9.1 MG/DL
CHLORIDE SERPL-SCNC: 108 MMOL/L
CO2 SERPL-SCNC: 24 MMOL/L
COLOR: YELLOW
CREAT SERPL-MCNC: 1.79 MG/DL
CREAT SPEC-SCNC: 243 MG/DL
CREAT/PROT UR: 0.1 RATIO
EOSINOPHIL # BLD AUTO: 0.12 K/UL
EOSINOPHIL NFR BLD AUTO: 2.8
GLUCOSE QUALITATIVE U: NEGATIVE MG/DL
GLUCOSE SERPL-MCNC: 130 MG/DL
HCT VFR BLD CALC: 43.1 %
HGB BLD-MCNC: 12.9 G/DL
IMM GRANULOCYTES NFR BLD AUTO: 0.5 %
KETONES URINE: NEGATIVE
LEUKOCYTE ESTERASE URINE: NEGATIVE
LYMPHOCYTES # BLD AUTO: 0.94 K/UL
LYMPHOCYTES NFR BLD AUTO: 21.7 %
MAN DIFF?: NORMAL
MCHC RBC-ENTMCNC: 22.2 PG
MCHC RBC-ENTMCNC: 29.9 GM/DL
MCV RBC AUTO: 74.3 FL
MICROSCOPIC-UA: NORMAL
MONOCYTES # BLD AUTO: 0.53 K/UL
MONOCYTES NFR BLD AUTO: 12.2 %
NEUTROPHILS # BLD AUTO: 2.72 K/UL
NEUTROPHILS NFR BLD AUTO: 62.6 %
NITRITE URINE: NEGATIVE
PH URINE: 5.5
PHOSPHATE SERPL-MCNC: 2.8 MG/DL
PLATELET # BLD AUTO: 172 K/UL
POTASSIUM SERPL-SCNC: 4.5 MMOL/L
PROT UR-MCNC: 29 MG/DL
PROTEIN URINE: 30 MG/DL
RBC # BLD: 5.8 M/UL
RBC # FLD: 15.6 %
RED BLOOD CELLS URINE: 3 /HPF
SODIUM SERPL-SCNC: 145 MMOL/L
SPECIFIC GRAVITY URINE: 1.03
SQUAMOUS EPITHELIAL CELLS: 1 /HPF
TACROLIMUS SERPL-MCNC: 4.6 NG/ML
UROBILINOGEN URINE: NEGATIVE MG/DL
WBC # FLD AUTO: 4.34 K/UL
WHITE BLOOD CELLS URINE: 3 /HPF

## 2018-01-08 LAB — BKV DNA SPEC QL NAA+PROBE: NORMAL

## 2018-08-29 ENCOUNTER — APPOINTMENT (OUTPATIENT)
Dept: NEPHROLOGY | Facility: CLINIC | Age: 63
End: 2018-08-29
Payer: MEDICARE

## 2018-08-29 VITALS
HEART RATE: 54 BPM | BODY MASS INDEX: 36.96 KG/M2 | WEIGHT: 288 LBS | OXYGEN SATURATION: 97 % | DIASTOLIC BLOOD PRESSURE: 84 MMHG | HEIGHT: 74 IN | SYSTOLIC BLOOD PRESSURE: 159 MMHG

## 2018-08-29 PROCEDURE — 99214 OFFICE O/P EST MOD 30 MIN: CPT

## 2018-08-29 RX ORDER — ROSUVASTATIN CALCIUM 20 MG/1
20 TABLET, FILM COATED ORAL
Qty: 60 | Refills: 0 | Status: ACTIVE | COMMUNITY
Start: 2018-08-06

## 2018-08-29 RX ORDER — FUROSEMIDE 20 MG/1
20 TABLET ORAL TWICE DAILY
Qty: 60 | Refills: 0 | Status: DISCONTINUED | COMMUNITY
End: 2018-08-29

## 2018-09-06 ENCOUNTER — INBOUND DOCUMENT (OUTPATIENT)
Age: 63
End: 2018-09-06

## 2018-10-19 LAB
APPEARANCE: CLEAR
BACTERIA: NEGATIVE
BILIRUBIN URINE: NEGATIVE
BLOOD URINE: ABNORMAL
COLOR: YELLOW
GLUCOSE QUALITATIVE U: NEGATIVE MG/DL
KETONES URINE: NEGATIVE
LEUKOCYTE ESTERASE URINE: NEGATIVE
MICROSCOPIC-UA: NORMAL
NITRITE URINE: NEGATIVE
PH URINE: 5.5
PROTEIN URINE: NEGATIVE MG/DL
RED BLOOD CELLS URINE: 5 /HPF
SPECIFIC GRAVITY URINE: 1.02
SQUAMOUS EPITHELIAL CELLS: 0 /HPF
UROBILINOGEN URINE: NEGATIVE MG/DL
WHITE BLOOD CELLS URINE: 1 /HPF

## 2018-10-23 LAB
ALBUMIN SERPL ELPH-MCNC: 4.3 G/DL
ANION GAP SERPL CALC-SCNC: 14 MMOL/L
BASOPHILS # BLD AUTO: 0.01 K/UL
BASOPHILS NFR BLD AUTO: 0.2 %
BUN SERPL-MCNC: 22 MG/DL
CALCIUM SERPL-MCNC: 9 MG/DL
CALCIUM SERPL-MCNC: 9.1 MG/DL
CHLORIDE SERPL-SCNC: 106 MMOL/L
CO2 SERPL-SCNC: 23 MMOL/L
CREAT SERPL-MCNC: 1.83 MG/DL
CREAT SPEC-SCNC: 189 MG/DL
CREAT/PROT UR: 0.1 RATIO
EOSINOPHIL # BLD AUTO: 0.11 K/UL
EOSINOPHIL NFR BLD AUTO: 2.5 %
GLUCOSE SERPL-MCNC: 131 MG/DL
HCT VFR BLD CALC: 40.8 %
HGB BLD-MCNC: 12.5 G/DL
IMM GRANULOCYTES NFR BLD AUTO: 0.4 %
LYMPHOCYTES # BLD AUTO: 1.04 K/UL
LYMPHOCYTES NFR BLD AUTO: 23.3 %
MAN DIFF?: NORMAL
MCHC RBC-ENTMCNC: 22.4 PG
MCHC RBC-ENTMCNC: 30.6 GM/DL
MCV RBC AUTO: 73.2 FL
MONOCYTES # BLD AUTO: 0.34 K/UL
MONOCYTES NFR BLD AUTO: 7.6 %
NEUTROPHILS # BLD AUTO: 2.94 K/UL
NEUTROPHILS NFR BLD AUTO: 66 %
PARATHYROID HORMONE INTACT: 114 PG/ML
PHOSPHATE SERPL-MCNC: 3.1 MG/DL
PLATELET # BLD AUTO: 162 K/UL
POTASSIUM SERPL-SCNC: 4.4 MMOL/L
PROT UR-MCNC: 15 MG/DL
RBC # BLD: 5.57 M/UL
RBC # FLD: 16 %
SODIUM SERPL-SCNC: 143 MMOL/L
TACROLIMUS SERPL-MCNC: 5.9 NG/ML
WBC # FLD AUTO: 4.46 K/UL

## 2018-11-09 ENCOUNTER — APPOINTMENT (OUTPATIENT)
Dept: VASCULAR SURGERY | Facility: CLINIC | Age: 63
End: 2018-11-09
Payer: MEDICARE

## 2018-11-09 PROCEDURE — 93926 LOWER EXTREMITY STUDY: CPT

## 2018-11-09 PROCEDURE — 99213 OFFICE O/P EST LOW 20 MIN: CPT

## 2019-03-08 ENCOUNTER — INPATIENT (INPATIENT)
Facility: HOSPITAL | Age: 64
LOS: 9 days | Discharge: HOME | DRG: 871 | End: 2019-03-18
Attending: INTERNAL MEDICINE | Admitting: INTERNAL MEDICINE
Payer: COMMERCIAL

## 2019-03-08 VITALS
TEMPERATURE: 100 F | WEIGHT: 285.94 LBS | HEIGHT: 74 IN | OXYGEN SATURATION: 99 % | HEART RATE: 78 BPM | RESPIRATION RATE: 20 BRPM | SYSTOLIC BLOOD PRESSURE: 107 MMHG | DIASTOLIC BLOOD PRESSURE: 83 MMHG

## 2019-03-08 DIAGNOSIS — E87.2 ACIDOSIS: ICD-10-CM

## 2019-03-08 DIAGNOSIS — N17.9 ACUTE KIDNEY FAILURE, UNSPECIFIED: ICD-10-CM

## 2019-03-08 DIAGNOSIS — N40.0 BENIGN PROSTATIC HYPERPLASIA WITHOUT LOWER URINARY TRACT SYMPTOMS: ICD-10-CM

## 2019-03-08 DIAGNOSIS — Z94.0 KIDNEY TRANSPLANT STATUS: Chronic | ICD-10-CM

## 2019-03-08 DIAGNOSIS — I10 ESSENTIAL (PRIMARY) HYPERTENSION: ICD-10-CM

## 2019-03-08 DIAGNOSIS — D89.9 DISORDER INVOLVING THE IMMUNE MECHANISM, UNSPECIFIED: ICD-10-CM

## 2019-03-08 DIAGNOSIS — Z98.89 OTHER SPECIFIED POSTPROCEDURAL STATES: Chronic | ICD-10-CM

## 2019-03-08 DIAGNOSIS — R50.9 FEVER, UNSPECIFIED: ICD-10-CM

## 2019-03-08 DIAGNOSIS — E11.9 TYPE 2 DIABETES MELLITUS WITHOUT COMPLICATIONS: ICD-10-CM

## 2019-03-08 LAB
ALBUMIN SERPL ELPH-MCNC: 3.8 G/DL — SIGNIFICANT CHANGE UP (ref 3.3–5)
ALP SERPL-CCNC: 64 U/L — SIGNIFICANT CHANGE UP (ref 40–120)
ALT FLD-CCNC: 13 U/L — SIGNIFICANT CHANGE UP (ref 10–45)
ANION GAP SERPL CALC-SCNC: 17 MMOL/L — SIGNIFICANT CHANGE UP (ref 5–17)
APPEARANCE UR: CLEAR — SIGNIFICANT CHANGE UP
AST SERPL-CCNC: 16 U/L — SIGNIFICANT CHANGE UP (ref 10–40)
B-OH-BUTYR SERPL-SCNC: 1 MMOL/L — HIGH
BACTERIA # UR AUTO: NEGATIVE — SIGNIFICANT CHANGE UP
BASOPHILS # BLD AUTO: 0 K/UL — SIGNIFICANT CHANGE UP (ref 0–0.2)
BASOPHILS NFR BLD AUTO: 0 % — SIGNIFICANT CHANGE UP (ref 0–2)
BILIRUB SERPL-MCNC: 0.4 MG/DL — SIGNIFICANT CHANGE UP (ref 0.2–1.2)
BILIRUB UR-MCNC: NEGATIVE — SIGNIFICANT CHANGE UP
BUN SERPL-MCNC: 30 MG/DL — HIGH (ref 7–23)
CALCIUM SERPL-MCNC: 9.2 MG/DL — SIGNIFICANT CHANGE UP (ref 8.4–10.5)
CHLORIDE SERPL-SCNC: 97 MMOL/L — SIGNIFICANT CHANGE UP (ref 96–108)
CO2 SERPL-SCNC: 20 MMOL/L — LOW (ref 22–31)
COLOR SPEC: SIGNIFICANT CHANGE UP
CREAT SERPL-MCNC: 2.13 MG/DL — HIGH (ref 0.5–1.3)
DIFF PNL FLD: ABNORMAL
EOSINOPHIL # BLD AUTO: 0 K/UL — SIGNIFICANT CHANGE UP (ref 0–0.5)
EOSINOPHIL NFR BLD AUTO: 0.1 % — SIGNIFICANT CHANGE UP (ref 0–6)
EPI CELLS # UR: 1 /HPF — SIGNIFICANT CHANGE UP
GAS PNL BLDV: SIGNIFICANT CHANGE UP
GLUCOSE BLDC GLUCOMTR-MCNC: 371 MG/DL — HIGH (ref 70–99)
GLUCOSE BLDC GLUCOMTR-MCNC: 378 MG/DL — HIGH (ref 70–99)
GLUCOSE BLDC GLUCOMTR-MCNC: 385 MG/DL — HIGH (ref 70–99)
GLUCOSE BLDC GLUCOMTR-MCNC: 403 MG/DL — HIGH (ref 70–99)
GLUCOSE SERPL-MCNC: 406 MG/DL — HIGH (ref 70–99)
GLUCOSE UR QL: ABNORMAL
HCT VFR BLD CALC: 39.9 % — SIGNIFICANT CHANGE UP (ref 39–50)
HGB BLD-MCNC: 12.9 G/DL — LOW (ref 13–17)
HYALINE CASTS # UR AUTO: 0 /LPF — SIGNIFICANT CHANGE UP (ref 0–2)
KETONES UR-MCNC: ABNORMAL
LEUKOCYTE ESTERASE UR-ACNC: NEGATIVE — SIGNIFICANT CHANGE UP
LYMPHOCYTES # BLD AUTO: 0.3 K/UL — LOW (ref 1–3.3)
LYMPHOCYTES # BLD AUTO: 3 % — LOW (ref 13–44)
MCHC RBC-ENTMCNC: 22.9 PG — LOW (ref 27–34)
MCHC RBC-ENTMCNC: 32.4 GM/DL — SIGNIFICANT CHANGE UP (ref 32–36)
MCV RBC AUTO: 70.8 FL — LOW (ref 80–100)
MONOCYTES # BLD AUTO: 1.3 K/UL — HIGH (ref 0–0.9)
MONOCYTES NFR BLD AUTO: 13.6 % — SIGNIFICANT CHANGE UP (ref 2–14)
NEUTROPHILS # BLD AUTO: 8 K/UL — HIGH (ref 1.8–7.4)
NEUTROPHILS NFR BLD AUTO: 83.2 % — HIGH (ref 43–77)
NITRITE UR-MCNC: NEGATIVE — SIGNIFICANT CHANGE UP
PH UR: 6.5 — SIGNIFICANT CHANGE UP (ref 5–8)
PLATELET # BLD AUTO: 134 K/UL — LOW (ref 150–400)
POTASSIUM SERPL-MCNC: 4.2 MMOL/L — SIGNIFICANT CHANGE UP (ref 3.5–5.3)
POTASSIUM SERPL-SCNC: 4.2 MMOL/L — SIGNIFICANT CHANGE UP (ref 3.5–5.3)
PROT SERPL-MCNC: 6.8 G/DL — SIGNIFICANT CHANGE UP (ref 6–8.3)
PROT UR-MCNC: ABNORMAL
RAPID RVP RESULT: SIGNIFICANT CHANGE UP
RBC # BLD: 5.64 M/UL — SIGNIFICANT CHANGE UP (ref 4.2–5.8)
RBC # FLD: 13.9 % — SIGNIFICANT CHANGE UP (ref 10.3–14.5)
RBC CASTS # UR COMP ASSIST: 1 /HPF — SIGNIFICANT CHANGE UP (ref 0–4)
SODIUM SERPL-SCNC: 134 MMOL/L — LOW (ref 135–145)
SP GR SPEC: 1.02 — SIGNIFICANT CHANGE UP (ref 1.01–1.02)
TACROLIMUS SERPL-MCNC: 4.4 NG/ML — SIGNIFICANT CHANGE UP
UROBILINOGEN FLD QL: NEGATIVE — SIGNIFICANT CHANGE UP
WBC # BLD: 9.6 K/UL — SIGNIFICANT CHANGE UP (ref 3.8–10.5)
WBC # FLD AUTO: 9.6 K/UL — SIGNIFICANT CHANGE UP (ref 3.8–10.5)
WBC UR QL: 3 /HPF — SIGNIFICANT CHANGE UP (ref 0–5)

## 2019-03-08 PROCEDURE — 99223 1ST HOSP IP/OBS HIGH 75: CPT | Mod: GC

## 2019-03-08 PROCEDURE — 74176 CT ABD & PELVIS W/O CONTRAST: CPT | Mod: 26

## 2019-03-08 PROCEDURE — 71046 X-RAY EXAM CHEST 2 VIEWS: CPT | Mod: 26

## 2019-03-08 PROCEDURE — 99222 1ST HOSP IP/OBS MODERATE 55: CPT | Mod: GC

## 2019-03-08 PROCEDURE — 93010 ELECTROCARDIOGRAM REPORT: CPT

## 2019-03-08 PROCEDURE — 99285 EMERGENCY DEPT VISIT HI MDM: CPT | Mod: 25

## 2019-03-08 RX ORDER — ACETAMINOPHEN 500 MG
650 TABLET ORAL EVERY 6 HOURS
Qty: 0 | Refills: 0 | Status: DISCONTINUED | OUTPATIENT
Start: 2019-03-08 | End: 2019-03-18

## 2019-03-08 RX ORDER — PIPERACILLIN AND TAZOBACTAM 4; .5 G/20ML; G/20ML
3.38 INJECTION, POWDER, LYOPHILIZED, FOR SOLUTION INTRAVENOUS ONCE
Qty: 0 | Refills: 0 | Status: COMPLETED | OUTPATIENT
Start: 2019-03-08 | End: 2019-03-08

## 2019-03-08 RX ORDER — INSULIN LISPRO 100/ML
VIAL (ML) SUBCUTANEOUS
Qty: 0 | Refills: 0 | Status: DISCONTINUED | OUTPATIENT
Start: 2019-03-08 | End: 2019-03-18

## 2019-03-08 RX ORDER — DEXTROSE 50 % IN WATER 50 %
25 SYRINGE (ML) INTRAVENOUS ONCE
Qty: 0 | Refills: 0 | Status: DISCONTINUED | OUTPATIENT
Start: 2019-03-08 | End: 2019-03-18

## 2019-03-08 RX ORDER — ACETAMINOPHEN 500 MG
975 TABLET ORAL ONCE
Qty: 0 | Refills: 0 | Status: COMPLETED | OUTPATIENT
Start: 2019-03-08 | End: 2019-03-08

## 2019-03-08 RX ORDER — NIFEDIPINE 30 MG
60 TABLET, EXTENDED RELEASE 24 HR ORAL DAILY
Qty: 0 | Refills: 0 | Status: DISCONTINUED | OUTPATIENT
Start: 2019-03-08 | End: 2019-03-18

## 2019-03-08 RX ORDER — TACROLIMUS 5 MG/1
4 CAPSULE ORAL EVERY 12 HOURS
Qty: 0 | Refills: 0 | Status: DISCONTINUED | OUTPATIENT
Start: 2019-03-08 | End: 2019-03-15

## 2019-03-08 RX ORDER — GLUCAGON INJECTION, SOLUTION 0.5 MG/.1ML
1 INJECTION, SOLUTION SUBCUTANEOUS ONCE
Qty: 0 | Refills: 0 | Status: DISCONTINUED | OUTPATIENT
Start: 2019-03-08 | End: 2019-03-18

## 2019-03-08 RX ORDER — INSULIN LISPRO 100/ML
8 VIAL (ML) SUBCUTANEOUS
Qty: 0 | Refills: 0 | Status: DISCONTINUED | OUTPATIENT
Start: 2019-03-08 | End: 2019-03-09

## 2019-03-08 RX ORDER — ONDANSETRON 8 MG/1
4 TABLET, FILM COATED ORAL EVERY 6 HOURS
Qty: 0 | Refills: 0 | Status: DISCONTINUED | OUTPATIENT
Start: 2019-03-08 | End: 2019-03-18

## 2019-03-08 RX ORDER — MYCOPHENOLATE MOFETIL 250 MG/1
250 CAPSULE ORAL
Qty: 0 | Refills: 0 | Status: DISCONTINUED | OUTPATIENT
Start: 2019-03-08 | End: 2019-03-09

## 2019-03-08 RX ORDER — HEPARIN SODIUM 5000 [USP'U]/ML
5000 INJECTION INTRAVENOUS; SUBCUTANEOUS EVERY 8 HOURS
Qty: 0 | Refills: 0 | Status: DISCONTINUED | OUTPATIENT
Start: 2019-03-08 | End: 2019-03-18

## 2019-03-08 RX ORDER — SODIUM CHLORIDE 9 MG/ML
1000 INJECTION, SOLUTION INTRAVENOUS
Qty: 0 | Refills: 0 | Status: DISCONTINUED | OUTPATIENT
Start: 2019-03-08 | End: 2019-03-18

## 2019-03-08 RX ORDER — INSULIN DETEMIR 100/ML (3)
10 INSULIN PEN (ML) SUBCUTANEOUS ONCE
Qty: 0 | Refills: 0 | Status: DISCONTINUED | OUTPATIENT
Start: 2019-03-08 | End: 2019-03-08

## 2019-03-08 RX ORDER — DEXTROSE 50 % IN WATER 50 %
15 SYRINGE (ML) INTRAVENOUS ONCE
Qty: 0 | Refills: 0 | Status: DISCONTINUED | OUTPATIENT
Start: 2019-03-08 | End: 2019-03-18

## 2019-03-08 RX ORDER — LOSARTAN POTASSIUM 100 MG/1
25 TABLET, FILM COATED ORAL DAILY
Qty: 0 | Refills: 0 | Status: DISCONTINUED | OUTPATIENT
Start: 2019-03-08 | End: 2019-03-09

## 2019-03-08 RX ORDER — CALCIUM CARBONATE 500(1250)
1 TABLET ORAL
Qty: 0 | Refills: 0 | Status: DISCONTINUED | OUTPATIENT
Start: 2019-03-08 | End: 2019-03-18

## 2019-03-08 RX ORDER — PIPERACILLIN AND TAZOBACTAM 4; .5 G/20ML; G/20ML
3.38 INJECTION, POWDER, LYOPHILIZED, FOR SOLUTION INTRAVENOUS EVERY 8 HOURS
Qty: 0 | Refills: 0 | Status: DISCONTINUED | OUTPATIENT
Start: 2019-03-08 | End: 2019-03-09

## 2019-03-08 RX ORDER — INSULIN GLARGINE 100 [IU]/ML
10 INJECTION, SOLUTION SUBCUTANEOUS ONCE
Qty: 0 | Refills: 0 | Status: COMPLETED | OUTPATIENT
Start: 2019-03-08 | End: 2019-03-08

## 2019-03-08 RX ORDER — ACETAMINOPHEN 500 MG
500 TABLET ORAL ONCE
Qty: 0 | Refills: 0 | Status: COMPLETED | OUTPATIENT
Start: 2019-03-08 | End: 2019-03-08

## 2019-03-08 RX ORDER — ATORVASTATIN CALCIUM 80 MG/1
20 TABLET, FILM COATED ORAL AT BEDTIME
Qty: 0 | Refills: 0 | Status: DISCONTINUED | OUTPATIENT
Start: 2019-03-08 | End: 2019-03-18

## 2019-03-08 RX ORDER — DEXTROSE 50 % IN WATER 50 %
12.5 SYRINGE (ML) INTRAVENOUS ONCE
Qty: 0 | Refills: 0 | Status: DISCONTINUED | OUTPATIENT
Start: 2019-03-08 | End: 2019-03-18

## 2019-03-08 RX ORDER — ASPIRIN/CALCIUM CARB/MAGNESIUM 324 MG
81 TABLET ORAL DAILY
Qty: 0 | Refills: 0 | Status: DISCONTINUED | OUTPATIENT
Start: 2019-03-08 | End: 2019-03-18

## 2019-03-08 RX ORDER — SODIUM CHLORIDE 9 MG/ML
1000 INJECTION INTRAMUSCULAR; INTRAVENOUS; SUBCUTANEOUS ONCE
Qty: 0 | Refills: 0 | Status: COMPLETED | OUTPATIENT
Start: 2019-03-08 | End: 2019-03-08

## 2019-03-08 RX ORDER — TAMSULOSIN HYDROCHLORIDE 0.4 MG/1
0.4 CAPSULE ORAL AT BEDTIME
Qty: 0 | Refills: 0 | Status: DISCONTINUED | OUTPATIENT
Start: 2019-03-08 | End: 2019-03-18

## 2019-03-08 RX ORDER — METOPROLOL TARTRATE 50 MG
100 TABLET ORAL
Qty: 0 | Refills: 0 | Status: DISCONTINUED | OUTPATIENT
Start: 2019-03-08 | End: 2019-03-09

## 2019-03-08 RX ORDER — INSULIN GLARGINE 100 [IU]/ML
10 INJECTION, SOLUTION SUBCUTANEOUS AT BEDTIME
Qty: 0 | Refills: 0 | Status: DISCONTINUED | OUTPATIENT
Start: 2019-03-08 | End: 2019-03-09

## 2019-03-08 RX ADMIN — Medication 1 TABLET(S): at 22:39

## 2019-03-08 RX ADMIN — ONDANSETRON 4 MILLIGRAM(S): 8 TABLET, FILM COATED ORAL at 21:06

## 2019-03-08 RX ADMIN — SODIUM CHLORIDE 1000 MILLILITER(S): 9 INJECTION INTRAMUSCULAR; INTRAVENOUS; SUBCUTANEOUS at 13:36

## 2019-03-08 RX ADMIN — PIPERACILLIN AND TAZOBACTAM 200 GRAM(S): 4; .5 INJECTION, POWDER, LYOPHILIZED, FOR SOLUTION INTRAVENOUS at 16:30

## 2019-03-08 RX ADMIN — ATORVASTATIN CALCIUM 20 MILLIGRAM(S): 80 TABLET, FILM COATED ORAL at 20:55

## 2019-03-08 RX ADMIN — SODIUM CHLORIDE 1000 MILLILITER(S): 9 INJECTION INTRAMUSCULAR; INTRAVENOUS; SUBCUTANEOUS at 11:38

## 2019-03-08 RX ADMIN — TAMSULOSIN HYDROCHLORIDE 0.4 MILLIGRAM(S): 0.4 CAPSULE ORAL at 20:55

## 2019-03-08 RX ADMIN — TACROLIMUS 4 MILLIGRAM(S): 5 CAPSULE ORAL at 22:37

## 2019-03-08 RX ADMIN — Medication 500 MILLIGRAM(S): at 20:49

## 2019-03-08 RX ADMIN — Medication 975 MILLIGRAM(S): at 11:37

## 2019-03-08 RX ADMIN — PIPERACILLIN AND TAZOBACTAM 25 GRAM(S): 4; .5 INJECTION, POWDER, LYOPHILIZED, FOR SOLUTION INTRAVENOUS at 21:06

## 2019-03-08 RX ADMIN — Medication 975 MILLIGRAM(S): at 17:39

## 2019-03-08 RX ADMIN — Medication 5 MILLIGRAM(S): at 20:55

## 2019-03-08 RX ADMIN — INSULIN GLARGINE 10 UNIT(S): 100 INJECTION, SOLUTION SUBCUTANEOUS at 16:31

## 2019-03-08 RX ADMIN — INSULIN GLARGINE 10 UNIT(S): 100 INJECTION, SOLUTION SUBCUTANEOUS at 22:35

## 2019-03-08 RX ADMIN — Medication 650 MILLIGRAM(S): at 22:34

## 2019-03-08 RX ADMIN — Medication 975 MILLIGRAM(S): at 17:09

## 2019-03-08 RX ADMIN — Medication 650 MILLIGRAM(S): at 23:41

## 2019-03-08 RX ADMIN — Medication 975 MILLIGRAM(S): at 13:36

## 2019-03-08 RX ADMIN — MYCOPHENOLATE MOFETIL 250 MILLIGRAM(S): 250 CAPSULE ORAL at 20:55

## 2019-03-08 RX ADMIN — Medication 100 MILLIGRAM(S): at 20:19

## 2019-03-08 RX ADMIN — Medication 5: at 22:36

## 2019-03-08 RX ADMIN — Medication 500 MILLIGRAM(S): at 20:19

## 2019-03-08 RX ADMIN — HEPARIN SODIUM 5000 UNIT(S): 5000 INJECTION INTRAVENOUS; SUBCUTANEOUS at 20:56

## 2019-03-08 RX ADMIN — Medication 60 MILLIGRAM(S): at 20:55

## 2019-03-08 NOTE — PROGRESS NOTE ADULT - ASSESSMENT
63 year old male with h/o DDRT in 2011, CKD(baseline 1.6-1.8) found to have BENEDICTO on CKD in setting of vomiting, infection and ARB use.

## 2019-03-08 NOTE — CONSULT NOTE ADULT - ATTENDING COMMENTS
Patient seen and examined with ID fellow.  Agree with assessment and plan above.  Discussed with primary team.    Andrea Dias MD  Pager [phone]  After 5pm/weekends call [phone]

## 2019-03-08 NOTE — H&P ADULT - NSHPREVIEWOFSYSTEMS_GEN_ALL_CORE
REVIEW OF SYSTEMS:    CONSTITUTIONAL: No weakness, fevers or chills  EYES/ENT: No visual changes;  No vertigo or throat pain   NECK: No pain or stiffness  RESPIRATORY: No cough, wheezing, hemoptysis; No shortness of breath  CARDIOVASCULAR: No chest pain or palpitations  GASTROINTESTINAL: No abdominal or epigastric pain. No nausea, vomiting, or hematemesis; No diarrhea or constipation. No melena or hematochezia.  GENITOURINARY: No dysuria, frequency or hematuria  NEUROLOGICAL: No numbness or weakness  SKIN: No itching, burning, rashes, or lesions   All other review of systems is negative unless indicated above. normal...

## 2019-03-08 NOTE — PROGRESS NOTE ADULT - PROBLEM SELECTOR PLAN 2
Patient with h/o DDRT in 2011 and is on tacrolimus 4/4,  mg BID, and prednisone 5 mg daily. Recommend to hold MMF for now in view of GI complaints and underlying infection. Continue with outpatient dose of immunosuppression. Check 12 hour tacrolimus trough( level to be drawn 30 minutes prior to AM dose). Monitor daily tacrolimus levels.

## 2019-03-08 NOTE — ED ADULT NURSE NOTE - NSIMPLEMENTINTERV_GEN_ALL_ED
Implemented All Universal Safety Interventions:  [place] to call system. Call bell, personal items and telephone within reach. Instruct patient to call for assistance. Room bathroom lighting operational. Non-slip footwear when patient is off stretcher. Physically safe environment: no spills, clutter or unnecessary equipment. Stretcher in lowest position, wheels locked, appropriate side rails in place.

## 2019-03-08 NOTE — PROGRESS NOTE ADULT - PROBLEM SELECTOR PLAN 1
Patient with known history of CKD in setting of DDRT, DM, and HTN. Baseline Scr appears to be between 1.6-1.8. Latest Scr. is now elevated at 2.13. Patient with hemodynamically mediated BENEDICTO from vomiting and ARB use? Continue with IV hydration. Check UA, urine electrolytes, and renal ultrasound with doppler. Check 12 hour tacrolimus level as mentioned below. Recommend to hold ARB therapy for now. Monitor BMP daily. Avoid NSAIDs, RCAS, and other nephrotoxins. Renally dose all medications as per eGFR.

## 2019-03-08 NOTE — ED PROVIDER NOTE - OBJECTIVE STATEMENT
Rohan WINKLER MD PGY1: 63 M PMH ESRD s/p renal transplant 2011 @ [facility] (here as transplant nephro is here), T2DM, HTN, HLD, decreased visual acuity bilaterally p/w 2d fever, chills,  weakness, N/V x 3 without cough, dysuria, headache or abdominal pain. Did not take levemir and novolog today as unable to tolerate PO. BG 360s.     Nephro: Sissy Roque

## 2019-03-08 NOTE — H&P ADULT - ASSESSMENT
63 M PMH ESRD s/p renal transplant 2011 @ [facility] (here as transplant nephro is here), T2DM, HTN, HLD, decreased visual acuity bilaterally p/w 2d fever, chills,  weakness, N/V x 3 days  without cough, dysuria, headache or abdominal pain. No other associated symptoms

## 2019-03-08 NOTE — H&P ADULT - NSHPPHYSICALEXAM_GEN_ALL_CORE
General: WN/WD NAD  PERRLA  Neurology: A&Ox3, nonfocal, GARNETT x 4  Respiratory: CTA B/L  CV: RRR, S1S2, no murmurs, rubs or gallops  Abdominal: Soft, NT, ND +BS, Last BM  Extremities: No edema, + peripheral pulses  Skin Normal

## 2019-03-08 NOTE — ED ADULT NURSE REASSESSMENT NOTE - NS ED NURSE REASSESS COMMENT FT1
Pt resting quietly with family at bedside. Awaiting RVP results. Pt reports feeling a little better. Safety and comfort measures provided. Call bell within reach.

## 2019-03-08 NOTE — CONSULT NOTE ADULT - ASSESSMENT
63M with DM and ESRD s/p renal transplant 2011 on Cellcept, Tacrolimus and Prednisone, admitted 3/7/19 with acute fevers. Associated vomiting and one episode diarrhea. Daughter-in-law with brief febrile illness one week prior. Here 101.6F. Nontoxic. Initial workup negative - UA, CXR, RVP. BENEDICTO.   Possible gastroenteritis. Bacteremia is possible.     Recommend  -CT abdomen pelvis without contrast  -continue Zosyn 3.375GM IV q8h (CrCl around 37)   -if diarrhea returns, GI PCR panel   -f/u blood cultures     Discussed with primary team 63M with DM and ESRD s/p renal transplant 2011 on Cellcept, Tacrolimus and Prednisone, admitted 3/7/19 with acute fevers. Associated vomiting and one episode diarrhea. Daughter-in-law with brief febrile illness one week prior. Here 101.6F. Nontoxic. Initial workup negative - UA, CXR, RVP.   BENEDICTO.   Possible gastroenteritis given nausea and vomiting  With fever and chills would r/o bacteremia      Recommend  -CT abdomen pelvis without contrast given BENEDICTO  -Continue Zosyn 3.375GM IV q8h (CrCl around 37)   -if diarrhea returns, GI PCR panel   -f/u blood cultures   -Monitor CrCl while on abx    Discussed with primary team

## 2019-03-08 NOTE — ED PROVIDER NOTE - PHYSICAL EXAMINATION
Rohan WINKLER MD PGY1:   PHYSICAL EXAM:    GENERAL: NAD, well-developed  HEENT:  Atraumatic, Normocephalic. Able to touch chin to chest.   CHEST/LUNG: Chest rise equal bilaterally. CTAB.    HEART: Regular rate and rhythm  ABDOMEN: Soft, Nontender, Nondistended  EXTREMITIES:  2+ Peripheral Pulses.  PSYCH: A&Ox3  SKIN: No obvious rashes or lesions

## 2019-03-08 NOTE — H&P ADULT - NSHPLABSRESULTS_GEN_ALL_CORE
Lab Results:  CBC  CBC Full  -  ( 08 Mar 2019 11:55 )  WBC Count : 9.6 K/uL  Hemoglobin : 12.9 g/dL  Hematocrit : 39.9 %  Platelet Count - Automated : 134 K/uL  Mean Cell Volume : 70.8 fl  Mean Cell Hemoglobin : 22.9 pg  Mean Cell Hemoglobin Concentration : 32.4 gm/dL  Auto Neutrophil # : 8.0 K/uL  Auto Lymphocyte # : 0.3 K/uL  Auto Monocyte # : 1.3 K/uL  Auto Eosinophil # : 0.0 K/uL  Auto Basophil # : 0.0 K/uL  Auto Neutrophil % : 83.2 %  Auto Lymphocyte % : 3.0 %  Auto Monocyte % : 13.6 %  Auto Eosinophil % : 0.1 %  Auto Basophil % : 0.0 %    .		Differential:	[] Automated		[] Manual  Chemistry                        12.9   9.6   )-----------( 134      ( 08 Mar 2019 11:55 )             39.9     03-08    134<L>  |  97  |  30<H>  ----------------------------<  406<H>  4.2   |  20<L>  |  2.13<H>    Ca    9.2      08 Mar 2019 11:55    TPro  6.8  /  Alb  3.8  /  TBili  0.4  /  DBili  x   /  AST  16  /  ALT  13  /  AlkPhos  64  03-08    LIVER FUNCTIONS - ( 08 Mar 2019 11:55 )  Alb: 3.8 g/dL / Pro: 6.8 g/dL / ALK PHOS: 64 U/L / ALT: 13 U/L / AST: 16 U/L / GGT: x             Urinalysis Basic - ( 08 Mar 2019 16:01 )    Color: Light Yellow / Appearance: Clear / S.016 / pH: x  Gluc: x / Ketone: Small  / Bili: Negative / Urobili: Negative   Blood: x / Protein: 30 mg/dL / Nitrite: Negative   Leuk Esterase: Negative / RBC: 1 /hpf / WBC 3 /HPF   Sq Epi: x / Non Sq Epi: 1 /hpf / Bacteria: Negative            MICROBIOLOGY/CULTURES:      RADIOLOGY RESULTS: reviewed

## 2019-03-08 NOTE — ED ADULT NURSE NOTE - OBJECTIVE STATEMENT
62 y/o Male presenting to the ED ambulatory, A&Ox3, complaining of fever, nausea, vomiting and weakness for the past few days. Pt reports he has not been eating or drinking for the past few days. Pt took Tylenol last night which helped with the fever but has not taken any today. Pt hx of kidney transplant 7 years ago and is on immunosuppressants. Pt reports getting the flu shot this year. Pt denies chest pain, back pain, shortness of breath, dysuria, hematuria, cough, congestion. Abdomen soft, nondistended. Pt takes daily baby aspirin. Safety and comfort measures provided. Family at bedside. 62 y/o Male presenting to the ED ambulatory, A&Ox3, complaining of fever, nausea, vomiting and weakness since yesterday. Pt reports he has not been eating or drinking since the symptoms began. Pt took Tylenol last night which helped with the fever but has not taken any today. Pt hx of kidney transplant 7 years ago and is on immunosuppressants. Pt reports getting the flu shot this year. Pt denies chest pain, back pain, shortness of breath, dysuria, hematuria, cough, congestion. Abdomen soft, nondistended, nontender. Lung sounds clear. Heart sounds WNL. EKG preformed. Pt takes daily baby aspirin. Safety and comfort measures provided. Family at bedside. MD at bedside evaluating.

## 2019-03-08 NOTE — H&P ADULT - HISTORY OF PRESENT ILLNESS
63 M PMH ESRD s/p renal transplant 2011 @ [facility] (here as transplant nephro is here), T2DM, HTN, HLD, decreased visual acuity bilaterally p/w 2d fever, chills,  weakness, N/V x 3 days  without cough, dysuria, headache or abdominal pain. No other associated symptoms

## 2019-03-08 NOTE — ED PROVIDER NOTE - CLINICAL SUMMARY MEDICAL DECISION MAKING FREE TEXT BOX
Rohan WINKLER MD PGY1: 63 M s/p renal transplant on immunosuppresants and elevated BG p/w fever, chills, N/V and inability to tolerate PO. Will perform sepsis w/u with CXR, UA, and BC x 2. Will admit to unattached as PMD Dr Perla is only affiliated with [facility].

## 2019-03-08 NOTE — PROGRESS NOTE ADULT - SUBJECTIVE AND OBJECTIVE BOX
[facility] Division of Kidney Diseases & Hypertension  INITIAL CONSULT NOTE  [phone]--------------------------------------------------------------------------------    HPI: 63 year old male with h/o DDRT in 2011,CKD,  DM, HTN presented with complains of fever, nausea, vomiting and generalized weakness over the past few days. Nephrology was consulted for BENEDICTO and management of immunosuppression. On review of previous labs, baseline Scr. appears to be between 1.6-1.8. However Scr. is elevated at 2.13 today. Patient has ESRD thought to be from diabetic nephropathy and had DDRT done in 2011. Post transplant course was complicated by bacterial infection which resolved with antibiotics. Patient is currently on tacrolimus 4 mg BID,  mg BID, and prednisone 5 mg daily for immunosuppression. Currently patient still feels nauseous and one episode of vomiting. However he denies c/o SOB, CP, abdominal pain, diarrhea, or LE edema.     PAST HISTORY  --------------------------------------------------------------------------------  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 and limited vision in left eye  Bell's palsy: 2004  ESRD (end stage renal disease): s/p renal transplant  Type 2 diabetes mellitus  Obesity  Hypertension  Dyslipidemia  Diabetic neuropathy associated with type 2 diabetes mellitus  Benign prostatic hypertrophy  History of detached retina repair: right eye  S/P TURP  S/P kidney transplant: 2011    FAMILY HISTORY:  No pertinent family history in first degree relatives    PAST SOCIAL HISTORY:    ALLERGIES & MEDICATIONS  --------------------------------------------------------------------------------  Allergies    No Known Drug Allergies  Seafood (Pruritus; Rash)    Intolerances      Standing Inpatient Medications  insulin glargine Injectable (LANTUS) 10 Unit(s) SubCutaneous once  piperacillin/tazobactam IVPB. 3.375 Gram(s) IV Intermittent once    PRN Inpatient Medications      REVIEW OF SYSTEMS  --------------------------------------------------------------------------------  Gen: Fever +  Skin: No rashes  Head/Eyes/Ears/Mouth: No headache; Normal hearing; Normal vision w/o blurriness;   Respiratory: No dyspnea, cough, wheezing, hemoptysis  CV: No chest pain,   GI: Nausea +  : No increased frequency, dysuria, hematuria, nocturia  MSK: No joint pain/swelling;   Neuro: No dizziness/lightheadedness, weakness, seizures    All other systems were reviewed and are negative, except as noted.    VITALS/PHYSICAL EXAM  --------------------------------------------------------------------------------  T(C): 37.3 (03-08-19 @ 13:36), Max: 38.2 (03-08-19 @ 11:04)  HR: 71 (03-08-19 @ 13:36) (71 - 78)  BP: 149/68 (03-08-19 @ 13:36) (107/83 - 167/85)  RR: 18 (03-08-19 @ 13:36) (18 - 20)  SpO2: 96% (03-08-19 @ 13:36) (96% - 100%)  Wt(kg): --  Height (cm): 187.96 (03-08-19 @ 10:51)  Weight (kg): 129.7 (03-08-19 @ 10:51)  BMI (kg/m2): 36.7 (03-08-19 @ 10:51)  BSA (m2): 2.53 (03-08-19 @ 10:51)      Physical Exam:  	Gen: NAD  	HEENT: sclera anicteric   	Pulm: CTA B/L  	CV:  S1S2  	Abd: +BS, soft   	Ext: No B/L Lower ext edema  	Neuro: No focal deficits  	Skin: Warm, without rashes    LABS/STUDIES  --------------------------------------------------------------------------------              12.9   9.6   >-----------<  134      [03-08-19 @ 11:55]              39.9     134  |  97  |  30  ----------------------------<  406      [03-08-19 @ 11:55]  4.2   |  20  |  2.13        Ca     9.2     [03-08-19 @ 11:55]    TPro  6.8  /  Alb  3.8  /  TBili  0.4  /  DBili  x   /  AST  16  /  ALT  13  /  AlkPhos  64  [03-08-19 @ 11:55]          Creatinine Trend:  SCr 2.13 [03-08 @ 11:55]    Urinalysis - [03-08-19 @ 16:01]      Color Light Yellow / Appearance Clear / SG 1.016 / pH 6.5      Gluc 1000 mg/dL / Ketone Small  / Bili Negative / Urobili Negative       Blood Small / Protein 30 mg/dL / Leuk Est Negative / Nitrite Negative      RBC 1 / WBC 3 / Hyaline 0 / Gran  / Sq Epi  / Non Sq Epi 1 / Bacteria Negative

## 2019-03-08 NOTE — ED PROVIDER NOTE - ATTENDING CONTRIBUTION TO CARE
Attending MD Ibrahim.  Agree with above.  Pt is a 64 yo male with hx of ESRD s/p renal tx in 2011 on immune suppression presenting to ED now with 2 days objective fever, chills, weakness, nausea, 2 days of emesis/reduced appetite. Denies cough, headache, neck stiffness, inc frequency of urination/dysuria.  Tx nephron is at NS.  Pt is DMII but did not take insulin this morning because of lack of PO.  VS’s with temp 100.7.  Rest of vitals non-actionable.  Exam largely unremarkable.  CXR, U/A, VBG, RVP, fluids ordered.  Planned sepsis work-up. Attending MD Ibrahim.  Agree with above.  Pt is a 62 yo male with hx of ESRD s/p renal tx in 2011 on immune suppression presenting to ED now with 2 days objective fever, chills, weakness, nausea, 2 days of emesis/reduced appetite. Denies cough, headache, neck stiffness, inc frequency of urination/dysuria.  Tx nephron is at NS.  Pt is DMII but did not take insulin this morning because of lack of PO.  VS’s with temp 100.7.  Rest of vitals non-actionable.  Exam largely unremarkable.  CXR, U/A, VBG, RVP, fluids ordered.  Planned sepsis work-up.  Pt found to have mild BENEDICTO, fever of unclear origin on immune suppression.  Abxs ordered and RVP neg.  Stable for admission for renal support, continued management of unclear source of pt's infection.

## 2019-03-08 NOTE — PROGRESS NOTE ADULT - PROBLEM SELECTOR PLAN 3
Patient with metabolic acidosis in setting of renal failure, lactic acidosis, and diabetic ketoacidosis. Latest serum CO2 noted to be 20. Recommend to continue with IV hydration and insulin for glycemic control. Monitor serum CO2 level.

## 2019-03-09 DIAGNOSIS — K86.1 OTHER CHRONIC PANCREATITIS: ICD-10-CM

## 2019-03-09 DIAGNOSIS — N18.6 END STAGE RENAL DISEASE: ICD-10-CM

## 2019-03-09 LAB
-  COAGULASE NEGATIVE STAPHYLOCOCCUS: SIGNIFICANT CHANGE UP
ALBUMIN SERPL ELPH-MCNC: 3.8 G/DL — SIGNIFICANT CHANGE UP (ref 3.3–5)
ALP SERPL-CCNC: 60 U/L — SIGNIFICANT CHANGE UP (ref 40–120)
ALT FLD-CCNC: 16 U/L — SIGNIFICANT CHANGE UP (ref 10–45)
ANION GAP SERPL CALC-SCNC: 18 MMOL/L — HIGH (ref 5–17)
AST SERPL-CCNC: 20 U/L — SIGNIFICANT CHANGE UP (ref 10–40)
BILIRUB SERPL-MCNC: 0.4 MG/DL — SIGNIFICANT CHANGE UP (ref 0.2–1.2)
BUN SERPL-MCNC: 29 MG/DL — HIGH (ref 7–23)
CALCIUM SERPL-MCNC: 9.2 MG/DL — SIGNIFICANT CHANGE UP (ref 8.4–10.5)
CHLORIDE SERPL-SCNC: 99 MMOL/L — SIGNIFICANT CHANGE UP (ref 96–108)
CO2 SERPL-SCNC: 20 MMOL/L — LOW (ref 22–31)
CREAT SERPL-MCNC: 2.34 MG/DL — HIGH (ref 0.5–1.3)
CULTURE RESULTS: SIGNIFICANT CHANGE UP
GLUCOSE BLDC GLUCOMTR-MCNC: 247 MG/DL — HIGH (ref 70–99)
GLUCOSE BLDC GLUCOMTR-MCNC: 277 MG/DL — HIGH (ref 70–99)
GLUCOSE BLDC GLUCOMTR-MCNC: 321 MG/DL — HIGH (ref 70–99)
GLUCOSE BLDC GLUCOMTR-MCNC: 341 MG/DL — HIGH (ref 70–99)
GLUCOSE BLDC GLUCOMTR-MCNC: 348 MG/DL — HIGH (ref 70–99)
GLUCOSE SERPL-MCNC: 356 MG/DL — HIGH (ref 70–99)
GRAM STN FLD: SIGNIFICANT CHANGE UP
HBA1C BLD-MCNC: 9.2 % — HIGH (ref 4–5.6)
HCT VFR BLD CALC: 42.3 % — SIGNIFICANT CHANGE UP (ref 39–50)
HCV AB S/CO SERPL IA: 0.06 S/CO — SIGNIFICANT CHANGE UP (ref 0–0.79)
HCV AB SERPL-IMP: SIGNIFICANT CHANGE UP
HGB BLD-MCNC: 12.3 G/DL — LOW (ref 13–17)
MCHC RBC-ENTMCNC: 21.7 PG — LOW (ref 27–34)
MCHC RBC-ENTMCNC: 29.1 GM/DL — LOW (ref 32–36)
MCV RBC AUTO: 74.7 FL — LOW (ref 80–100)
METHOD TYPE: SIGNIFICANT CHANGE UP
PLATELET # BLD AUTO: 149 K/UL — LOW (ref 150–400)
POTASSIUM SERPL-MCNC: 4.5 MMOL/L — SIGNIFICANT CHANGE UP (ref 3.5–5.3)
POTASSIUM SERPL-SCNC: 4.5 MMOL/L — SIGNIFICANT CHANGE UP (ref 3.5–5.3)
PROT SERPL-MCNC: 6.4 G/DL — SIGNIFICANT CHANGE UP (ref 6–8.3)
RBC # BLD: 5.66 M/UL — SIGNIFICANT CHANGE UP (ref 4.2–5.8)
RBC # FLD: 15.3 % — HIGH (ref 10.3–14.5)
SODIUM SERPL-SCNC: 137 MMOL/L — SIGNIFICANT CHANGE UP (ref 135–145)
SPECIMEN SOURCE: SIGNIFICANT CHANGE UP
TACROLIMUS SERPL-MCNC: 5 NG/ML — SIGNIFICANT CHANGE UP
TROPONIN T, HIGH SENSITIVITY RESULT: 37 NG/L — SIGNIFICANT CHANGE UP (ref 0–51)
WBC # BLD: 14.04 K/UL — HIGH (ref 3.8–10.5)
WBC # FLD AUTO: 14.04 K/UL — HIGH (ref 3.8–10.5)

## 2019-03-09 PROCEDURE — 99233 SBSQ HOSP IP/OBS HIGH 50: CPT | Mod: GC

## 2019-03-09 RX ORDER — MEROPENEM 1 G/30ML
1000 INJECTION INTRAVENOUS EVERY 12 HOURS
Qty: 0 | Refills: 0 | Status: DISCONTINUED | OUTPATIENT
Start: 2019-03-09 | End: 2019-03-11

## 2019-03-09 RX ORDER — LABETALOL HCL 100 MG
200 TABLET ORAL THREE TIMES A DAY
Qty: 0 | Refills: 0 | Status: DISCONTINUED | OUTPATIENT
Start: 2019-03-09 | End: 2019-03-15

## 2019-03-09 RX ORDER — INSULIN LISPRO 100/ML
14 VIAL (ML) SUBCUTANEOUS
Qty: 0 | Refills: 0 | Status: DISCONTINUED | OUTPATIENT
Start: 2019-03-09 | End: 2019-03-11

## 2019-03-09 RX ORDER — INSULIN GLARGINE 100 [IU]/ML
22 INJECTION, SOLUTION SUBCUTANEOUS AT BEDTIME
Qty: 0 | Refills: 0 | Status: DISCONTINUED | OUTPATIENT
Start: 2019-03-09 | End: 2019-03-10

## 2019-03-09 RX ADMIN — Medication 8 UNIT(S): at 08:49

## 2019-03-09 RX ADMIN — Medication 1 TABLET(S): at 05:30

## 2019-03-09 RX ADMIN — MEROPENEM 100 MILLIGRAM(S): 1 INJECTION INTRAVENOUS at 18:33

## 2019-03-09 RX ADMIN — Medication 8 UNIT(S): at 12:32

## 2019-03-09 RX ADMIN — Medication 4: at 17:30

## 2019-03-09 RX ADMIN — LOSARTAN POTASSIUM 25 MILLIGRAM(S): 100 TABLET, FILM COATED ORAL at 05:31

## 2019-03-09 RX ADMIN — MYCOPHENOLATE MOFETIL 250 MILLIGRAM(S): 250 CAPSULE ORAL at 05:30

## 2019-03-09 RX ADMIN — Medication 5 MILLIGRAM(S): at 05:31

## 2019-03-09 RX ADMIN — TACROLIMUS 4 MILLIGRAM(S): 5 CAPSULE ORAL at 05:32

## 2019-03-09 RX ADMIN — Medication 1 TABLET(S): at 17:31

## 2019-03-09 RX ADMIN — HEPARIN SODIUM 5000 UNIT(S): 5000 INJECTION INTRAVENOUS; SUBCUTANEOUS at 21:40

## 2019-03-09 RX ADMIN — Medication 14 UNIT(S): at 17:30

## 2019-03-09 RX ADMIN — HEPARIN SODIUM 5000 UNIT(S): 5000 INJECTION INTRAVENOUS; SUBCUTANEOUS at 14:33

## 2019-03-09 RX ADMIN — TAMSULOSIN HYDROCHLORIDE 0.4 MILLIGRAM(S): 0.4 CAPSULE ORAL at 21:40

## 2019-03-09 RX ADMIN — TACROLIMUS 4 MILLIGRAM(S): 5 CAPSULE ORAL at 17:32

## 2019-03-09 RX ADMIN — Medication 81 MILLIGRAM(S): at 12:33

## 2019-03-09 RX ADMIN — PIPERACILLIN AND TAZOBACTAM 25 GRAM(S): 4; .5 INJECTION, POWDER, LYOPHILIZED, FOR SOLUTION INTRAVENOUS at 05:28

## 2019-03-09 RX ADMIN — Medication 650 MILLIGRAM(S): at 08:58

## 2019-03-09 RX ADMIN — Medication 650 MILLIGRAM(S): at 08:14

## 2019-03-09 RX ADMIN — Medication 100 MILLIGRAM(S): at 05:31

## 2019-03-09 RX ADMIN — HEPARIN SODIUM 5000 UNIT(S): 5000 INJECTION INTRAVENOUS; SUBCUTANEOUS at 05:28

## 2019-03-09 RX ADMIN — INSULIN GLARGINE 22 UNIT(S): 100 INJECTION, SOLUTION SUBCUTANEOUS at 21:40

## 2019-03-09 RX ADMIN — Medication 60 MILLIGRAM(S): at 05:31

## 2019-03-09 RX ADMIN — Medication 4: at 12:32

## 2019-03-09 RX ADMIN — Medication 200 MILLIGRAM(S): at 14:33

## 2019-03-09 RX ADMIN — Medication 200 MILLIGRAM(S): at 21:40

## 2019-03-09 RX ADMIN — Medication 4: at 08:50

## 2019-03-09 RX ADMIN — ATORVASTATIN CALCIUM 20 MILLIGRAM(S): 80 TABLET, FILM COATED ORAL at 21:40

## 2019-03-09 NOTE — CONSULT NOTE ADULT - PROBLEM SELECTOR RECOMMENDATION 9
pt has hx of ETOH pancreatitis  asymptomatic at this point  no further work up at this time
Will increase Lantus to 26 units at bed time.  Will increase Humalog to 14 units before each meal in addition to Humalog correction scale coverage.  Patient counseled for compliance with consistent low carb diet.

## 2019-03-09 NOTE — PROGRESS NOTE ADULT - PROBLEM SELECTOR PLAN 3
Patient with metabolic acidosis in setting of renal failure, lactic acidosis, and diabetic ketoacidosis. Latest serum CO2 noted to be 20. Recommend to continue with IV hydration and insulin for glycemic control. Monitor serum CO2 level. in setting of renal failure, lactic acidosis, and diabetic ketoacidosis.   - Needs glycemic control.   - F/U infectious w/up.  - Monitor serum CO2 level.

## 2019-03-09 NOTE — CONSULT NOTE ADULT - SUBJECTIVE AND OBJECTIVE BOX
Chief Complaint:  Patient is a 63y old  Male who presents with a chief complaint of fever (09 Mar 2019 12:17)    Legally blind  Bell's palsy  ESRD (end stage renal disease)  Type 2 diabetes mellitus  Obesity  Hypertension  Dyslipidemia  Diabetic neuropathy associated with type 2 diabetes mellitus  Benign prostatic hypertrophy  History of detached retina repair  S/P TURP  S/P kidney transplant     HPI:  63 M PMH ESRD s/p renal transplant  @ [facility] (here as transplant nephro is here), T2DM, HTN, HLD, decreased visual acuity bilaterally p/w 2d fever, chills,  weakness, N/V x 3 days  without cough, dysuria, headache or abdominal pain. No other associated symptoms (08 Mar 2019 15:01)      No Known Drug Allergies  Seafood (Pruritus; Rash)      acetaminophen   Tablet .. 650 milliGRAM(s) Oral every 6 hours PRN  aspirin enteric coated 81 milliGRAM(s) Oral daily  atorvastatin 20 milliGRAM(s) Oral at bedtime  calcium carbonate   1250 mG (OsCal) 1 Tablet(s) Oral two times a day  dextrose 40% Gel 15 Gram(s) Oral once PRN  dextrose 5%. 1000 milliLiter(s) IV Continuous <Continuous>  dextrose 50% Injectable 12.5 Gram(s) IV Push once  dextrose 50% Injectable 25 Gram(s) IV Push once  dextrose 50% Injectable 25 Gram(s) IV Push once  glucagon  Injectable 1 milliGRAM(s) IntraMuscular once PRN  heparin  Injectable 5000 Unit(s) SubCutaneous every 8 hours  insulin glargine Injectable (LANTUS) 10 Unit(s) SubCutaneous at bedtime  insulin lispro (HumaLOG) corrective regimen sliding scale   SubCutaneous three times a day before meals  insulin lispro Injectable (HumaLOG) 8 Unit(s) SubCutaneous three times a day before meals  labetalol 200 milliGRAM(s) Oral three times a day  meropenem  IVPB 1000 milliGRAM(s) IV Intermittent every 12 hours  NIFEdipine XL 60 milliGRAM(s) Oral daily  ondansetron Injectable 4 milliGRAM(s) IV Push every 6 hours PRN  predniSONE   Tablet 5 milliGRAM(s) Oral daily  tacrolimus 4 milliGRAM(s) Oral every 12 hours  tamsulosin 0.4 milliGRAM(s) Oral at bedtime        FAMILY HISTORY:  No pertinent family history in first degree relatives        Review of Systems:    General:  No wt loss, fevers, chills, night sweats,fatigue,   Eyes:  Good vision, no reported pain  ENT:  No sore throat, pain, runny nose, dysphagia  CV:  No pain, palpitatioins, hypo/hypertension  Resp:  No dyspnea, cough, tachypnea, wheezing  :  No pain, bleeding, incontinence, nocturia  Muscle:  No pain, weakness  Neuro:  No weakness, tingling, memory problems  Psych:  No fatigue, insomnia, mood problems, depression  Endocrine:  No polyuria, polydypsia, cold/heat intolerance  Heme:  No petechiae, ecchymosis, easy bruisability  Skin:  No rash, tattoos, scars, edema    Relevant Family History:       Relevant Social History:       Physical Exam:    Vital Signs:  Vital Signs Last 24 Hrs  T(C): 37 (09 Mar 2019 14:19), Max: 39.3 (08 Mar 2019 19:48)  T(F): 98.6 (09 Mar 2019 14:19), Max: 102.7 (08 Mar 2019 19:48)  HR: 73 (09 Mar 2019 14:19) (63 - 90)  BP: 138/71 (09 Mar 2019 14:19) (138/71 - 193/72)  BP(mean): --  RR: 18 (09 Mar 2019 14:19) (17 - 18)  SpO2: 94% (09 Mar 2019 14:19) (94% - 97%)  Daily     Daily     General:  Appears stated age, well-groomed, well-nourished, no distress  HEENT:  NC/AT,  conjunctivae clear and pink, no thyromegaly, nodules, adenopathy, no JVD  Chest:  Full & symmetric excursion, no increased effort, breath sounds clear  Cardiovascular:  Regular rhythm, S1, S2, no murmur/rub/S3/S4, no abdominal bruit, no edema  Abdomen:  Soft, non-tender, non-distended, normoactive bowel sounds,  no masses ,no hepatosplenomeagaly, no signs of chronic liver disease  Extremities:  no cyanosis,clubbing or edema  Skin:  No rash/erythema/ecchymoses/petechiae/wounds/abscess/warm/dry  Neuro/Psych:  Alert, oriented, no asterixis, no tremor, no encephalopathy    Laboratory:                            12.3   14.04 )-----------( 149      ( 09 Mar 2019 10:23 )             42.3     03-09    137  |  99  |  29<H>  ----------------------------<  356<H>  4.5   |  20<L>  |  2.34<H>    Ca    9.2      09 Mar 2019 07:54    TPro  6.4  /  Alb  3.8  /  TBili  0.4  /  DBili  x   /  AST  20  /  ALT  16  /  AlkPhos  60      LIVER FUNCTIONS - ( 09 Mar 2019 07:54 )  Alb: 3.8 g/dL / Pro: 6.4 g/dL / ALK PHOS: 60 U/L / ALT: 16 U/L / AST: 20 U/L / GGT: x             Urinalysis Basic - ( 08 Mar 2019 16:01 )    Color: Light Yellow / Appearance: Clear / S.016 / pH: x  Gluc: x / Ketone: Small  / Bili: Negative / Urobili: Negative   Blood: x / Protein: 30 mg/dL / Nitrite: Negative   Leuk Esterase: Negative / RBC: 1 /hpf / WBC 3 /HPF   Sq Epi: x / Non Sq Epi: 1 /hpf / Bacteria: Negative        Imaging:

## 2019-03-09 NOTE — PROGRESS NOTE ADULT - SUBJECTIVE AND OBJECTIVE BOX
Patient is a 63y old  Male who presents with a chief complaint of fever (09 Mar 2019 09:53)      INTERVAL HPI/OVERNIGHT EVENTS:  T(C): 36.9 (19 @ 09:52), Max: 39.3 (19 @ 19:48)  HR: 77 (19 @ 07:20) (63 - 90)  BP: 176/84 (19 @ 07:20) (107/83 - 193/72)  RR: 18 (19 @ 07:20) (17 - 20)  SpO2: 96% (19 @ 07:20) (95% - 100%)  Wt(kg): --  I&O's Summary    08 Mar 2019 07:  -  09 Mar 2019 07:00  --------------------------------------------------------  IN: 0 mL / OUT: 550 mL / NET: -550 mL    09 Mar 2019 06:01  -  09 Mar 2019 10:25  --------------------------------------------------------  IN: 220 mL / OUT: 0 mL / NET: 220 mL        LABS:                        12.9   9.6   )-----------( 134      ( 08 Mar 2019 11:55 )             39.9         137  |  99  |  29<H>  ----------------------------<  356<H>  4.5   |  20<L>  |  2.34<H>    Ca    9.2      09 Mar 2019 07:54    TPro  6.4  /  Alb  3.8  /  TBili  0.4  /  DBili  x   /  AST  20  /  ALT  16  /  AlkPhos  60  03-09      Urinalysis Basic - ( 08 Mar 2019 16:01 )    Color: Light Yellow / Appearance: Clear / S.016 / pH: x  Gluc: x / Ketone: Small  / Bili: Negative / Urobili: Negative   Blood: x / Protein: 30 mg/dL / Nitrite: Negative   Leuk Esterase: Negative / RBC: 1 /hpf / WBC 3 /HPF   Sq Epi: x / Non Sq Epi: 1 /hpf / Bacteria: Negative      CAPILLARY BLOOD GLUCOSE      POCT Blood Glucose.: 341 mg/dL (09 Mar 2019 08:34)  POCT Blood Glucose.: 378 mg/dL (08 Mar 2019 22:25)  POCT Blood Glucose.: 403 mg/dL (08 Mar 2019 22:23)  POCT Blood Glucose.: 385 mg/dL (08 Mar 2019 17:57)  POCT Blood Glucose.: 371 mg/dL (08 Mar 2019 16:31)  POCT Blood Glucose.: 362 mg/dL (08 Mar 2019 11:15)        Urinalysis Basic - ( 08 Mar 2019 16:01 )    Color: Light Yellow / Appearance: Clear / S.016 / pH: x  Gluc: x / Ketone: Small  / Bili: Negative / Urobili: Negative   Blood: x / Protein: 30 mg/dL / Nitrite: Negative   Leuk Esterase: Negative / RBC: 1 /hpf / WBC 3 /HPF   Sq Epi: x / Non Sq Epi: 1 /hpf / Bacteria: Negative        MEDICATIONS  (STANDING):  aspirin enteric coated 81 milliGRAM(s) Oral daily  atorvastatin 20 milliGRAM(s) Oral at bedtime  calcium carbonate   1250 mG (OsCal) 1 Tablet(s) Oral two times a day  dextrose 5%. 1000 milliLiter(s) (50 mL/Hr) IV Continuous <Continuous>  dextrose 50% Injectable 12.5 Gram(s) IV Push once  dextrose 50% Injectable 25 Gram(s) IV Push once  dextrose 50% Injectable 25 Gram(s) IV Push once  heparin  Injectable 5000 Unit(s) SubCutaneous every 8 hours  insulin glargine Injectable (LANTUS) 10 Unit(s) SubCutaneous at bedtime  insulin lispro (HumaLOG) corrective regimen sliding scale   SubCutaneous three times a day before meals  insulin lispro Injectable (HumaLOG) 8 Unit(s) SubCutaneous three times a day before meals  labetalol 200 milliGRAM(s) Oral three times a day  losartan 25 milliGRAM(s) Oral daily  NIFEdipine XL 60 milliGRAM(s) Oral daily  piperacillin/tazobactam IVPB. 3.375 Gram(s) IV Intermittent every 8 hours  predniSONE   Tablet 5 milliGRAM(s) Oral daily  tacrolimus 4 milliGRAM(s) Oral every 12 hours  tamsulosin 0.4 milliGRAM(s) Oral at bedtime    MEDICATIONS  (PRN):  acetaminophen   Tablet .. 650 milliGRAM(s) Oral every 6 hours PRN Temp greater or equal to 38C (100.4F)  dextrose 40% Gel 15 Gram(s) Oral once PRN Blood Glucose LESS THAN 70 milliGRAM(s)/deciliter  glucagon  Injectable 1 milliGRAM(s) IntraMuscular once PRN Glucose LESS THAN 70 milligrams/deciliter  ondansetron Injectable 4 milliGRAM(s) IV Push every 6 hours PRN Nausea          PHYSICAL EXAM:  GENERAL: NAD, well-groomed, well-developed  HEAD:  Atraumatic, Normocephalic  CHEST/LUNG: Clear to percussion bilaterally; No rales, rhonchi, wheezing, or rubs  HEART: Regular rate and rhythm; No murmurs, rubs, or gallops  ABDOMEN: Soft, Nontender, Nondistended; Bowel sounds present  EXTREMITIES:  2+ Peripheral Pulses, No clubbing, cyanosis, or edema  LYMPH: No lymphadenopathy noted  SKIN: No rashes or lesions    Care Discussed with Consultants/Other Providers [ ] YES  [ ] NO

## 2019-03-09 NOTE — PROGRESS NOTE ADULT - SUBJECTIVE AND OBJECTIVE BOX
[facility] DIVISION OF KIDNEY DISEASES AND HYPERTENSION -- FOLLOW UP NOTE  --------------------------------------------------------------------------------  Chief Complaint:  renal transplant    24 hour events/subjective:  Still spiking fevers. No more nausea or vomiting. Ate some egg this AM.      PAST HISTORY  --------------------------------------------------------------------------------  No significant changes to PMH, PSH, FHx, SHx, unless otherwise noted    ALLERGIES & MEDICATIONS  --------------------------------------------------------------------------------  Allergies    No Known Drug Allergies  Seafood (Pruritus; Rash)    Intolerances      Standing Inpatient Medications  aspirin enteric coated 81 milliGRAM(s) Oral daily  atorvastatin 20 milliGRAM(s) Oral at bedtime  calcium carbonate   1250 mG (OsCal) 1 Tablet(s) Oral two times a day  dextrose 5%. 1000 milliLiter(s) IV Continuous <Continuous>  dextrose 50% Injectable 12.5 Gram(s) IV Push once  dextrose 50% Injectable 25 Gram(s) IV Push once  dextrose 50% Injectable 25 Gram(s) IV Push once  heparin  Injectable 5000 Unit(s) SubCutaneous every 8 hours  insulin glargine Injectable (LANTUS) 10 Unit(s) SubCutaneous at bedtime  insulin lispro (HumaLOG) corrective regimen sliding scale   SubCutaneous three times a day before meals  insulin lispro Injectable (HumaLOG) 8 Unit(s) SubCutaneous three times a day before meals  labetalol 200 milliGRAM(s) Oral three times a day  losartan 25 milliGRAM(s) Oral daily  mycophenolate mofetil 250 milliGRAM(s) Oral two times a day  NIFEdipine XL 60 milliGRAM(s) Oral daily  piperacillin/tazobactam IVPB. 3.375 Gram(s) IV Intermittent every 8 hours  predniSONE   Tablet 5 milliGRAM(s) Oral daily  tacrolimus 4 milliGRAM(s) Oral every 12 hours  tamsulosin 0.4 milliGRAM(s) Oral at bedtime    PRN Inpatient Medications  acetaminophen   Tablet .. 650 milliGRAM(s) Oral every 6 hours PRN  dextrose 40% Gel 15 Gram(s) Oral once PRN  glucagon  Injectable 1 milliGRAM(s) IntraMuscular once PRN  ondansetron Injectable 4 milliGRAM(s) IV Push every 6 hours PRN      REVIEW OF SYSTEMS  --------------------------------------------------------------------------------  Gen: + fatigue  Respiratory: No dyspnea, cough  CV: No chest pain  GI: No abdominal pain, diarrhea  : No dysuria, hematuria    VITALS/PHYSICAL EXAM  --------------------------------------------------------------------------------  T(C): 36.9 (03-09-19 @ 09:52), Max: 39.3 (03-08-19 @ 19:48)  HR: 77 (03-09-19 @ 07:20) (63 - 90)  BP: 176/84 (03-09-19 @ 07:20) (107/83 - 193/72)  RR: 18 (03-09-19 @ 07:20) (17 - 20)  SpO2: 96% (03-09-19 @ 07:20) (95% - 100%)  Wt(kg): --  Height (cm): 187.96 (03-08-19 @ 10:51)  Weight (kg): 129.7 (03-08-19 @ 10:51)  BMI (kg/m2): 36.7 (03-08-19 @ 10:51)  BSA (m2): 2.53 (03-08-19 @ 10:51)      03-08-19 @ 07:01  -  03-09-19 @ 07:00  --------------------------------------------------------  IN: 0 mL / OUT: 550 mL / NET: -550 mL    03-09-19 @ 06:01  -  03-09-19 @ 09:54  --------------------------------------------------------  IN: 220 mL / OUT: 0 mL / NET: 220 mL      Physical Exam:  	Gen: NAD, well-appearing  	HEENT: supple neck; moist oral mucosa  	Pulm: CTA B/L  	CV: RRR, S1S2; no rub  	Back: No spinal or CVA tenderness; no sacral edema  	Abd: +BS, soft, nontender/nondistended  	: No suprapubic tenderness  	UE: Warm, FROM, no clubbing, intact strength; no edema  	LE: Warm, FROM, no clubbing, intact strength; no edema  	Neuro: No focal deficits  	Psych: Normal affect and mood  	Skin: Warm, without rashes  	Vascular access:    LABS/STUDIES  --------------------------------------------------------------------------------              12.9   9.6   >-----------<  134      [03-08-19 @ 11:55]              39.9     137  |  99  |  29  ----------------------------<  356      [03-09-19 @ 07:54]  4.5   |  20  |  2.34        Ca     9.2     [03-09-19 @ 07:54]    TPro  6.4  /  Alb  3.8  /  TBili  0.4  /  DBili  x   /  AST  20  /  ALT  16  /  AlkPhos  60  [03-09-19 @ 07:54]          Creatinine Trend:  SCr 2.34 [03-09 @ 07:54]  SCr 2.13 [03-08 @ 11:55]    Urinalysis - [03-08-19 @ 16:01]      Color Light Yellow / Appearance Clear / SG 1.016 / pH 6.5      Gluc 1000 mg/dL / Ketone Small  / Bili Negative / Urobili Negative       Blood Small / Protein 30 mg/dL / Leuk Est Negative / Nitrite Negative      RBC 1 / WBC 3 / Hyaline 0 / Gran  / Sq Epi  / Non Sq Epi 1 / Bacteria Negative      HbA1c 7.8      [04-13-17 @ 09:25] [facility] DIVISION OF KIDNEY DISEASES AND HYPERTENSION -- FOLLOW UP NOTE  --------------------------------------------------------------------------------  Chief Complaint:  renal transplant    24 hour events/subjective:  Still spiking fevers. No more nausea or vomiting. Ate some egg this AM.      PAST HISTORY  --------------------------------------------------------------------------------  No significant changes to PMH, PSH, FHx, SHx, unless otherwise noted    ALLERGIES & MEDICATIONS  --------------------------------------------------------------------------------  Allergies    No Known Drug Allergies  Seafood (Pruritus; Rash)    Intolerances      Standing Inpatient Medications  aspirin enteric coated 81 milliGRAM(s) Oral daily  atorvastatin 20 milliGRAM(s) Oral at bedtime  calcium carbonate   1250 mG (OsCal) 1 Tablet(s) Oral two times a day  dextrose 5%. 1000 milliLiter(s) IV Continuous <Continuous>  dextrose 50% Injectable 12.5 Gram(s) IV Push once  dextrose 50% Injectable 25 Gram(s) IV Push once  dextrose 50% Injectable 25 Gram(s) IV Push once  heparin  Injectable 5000 Unit(s) SubCutaneous every 8 hours  insulin glargine Injectable (LANTUS) 10 Unit(s) SubCutaneous at bedtime  insulin lispro (HumaLOG) corrective regimen sliding scale   SubCutaneous three times a day before meals  insulin lispro Injectable (HumaLOG) 8 Unit(s) SubCutaneous three times a day before meals  labetalol 200 milliGRAM(s) Oral three times a day  losartan 25 milliGRAM(s) Oral daily  mycophenolate mofetil 250 milliGRAM(s) Oral two times a day  NIFEdipine XL 60 milliGRAM(s) Oral daily  piperacillin/tazobactam IVPB. 3.375 Gram(s) IV Intermittent every 8 hours  predniSONE   Tablet 5 milliGRAM(s) Oral daily  tacrolimus 4 milliGRAM(s) Oral every 12 hours  tamsulosin 0.4 milliGRAM(s) Oral at bedtime    PRN Inpatient Medications  acetaminophen   Tablet .. 650 milliGRAM(s) Oral every 6 hours PRN  dextrose 40% Gel 15 Gram(s) Oral once PRN  glucagon  Injectable 1 milliGRAM(s) IntraMuscular once PRN  ondansetron Injectable 4 milliGRAM(s) IV Push every 6 hours PRN      REVIEW OF SYSTEMS  --------------------------------------------------------------------------------  Gen: + fatigue  Respiratory: No dyspnea, cough  CV: No chest pain  GI: No abdominal pain, diarrhea  : No dysuria, hematuria    VITALS/PHYSICAL EXAM  --------------------------------------------------------------------------------  T(C): 36.9 (03-09-19 @ 09:52), Max: 39.3 (03-08-19 @ 19:48)  HR: 77 (03-09-19 @ 07:20) (63 - 90)  BP: 176/84 (03-09-19 @ 07:20) (107/83 - 193/72)  RR: 18 (03-09-19 @ 07:20) (17 - 20)  SpO2: 96% (03-09-19 @ 07:20) (95% - 100%)  Wt(kg): --  Height (cm): 187.96 (03-08-19 @ 10:51)  Weight (kg): 129.7 (03-08-19 @ 10:51)  BMI (kg/m2): 36.7 (03-08-19 @ 10:51)  BSA (m2): 2.53 (03-08-19 @ 10:51)      03-08-19 @ 07:01  -  03-09-19 @ 07:00  --------------------------------------------------------  IN: 0 mL / OUT: 550 mL / NET: -550 mL    03-09-19 @ 06:01  -  03-09-19 @ 09:54  --------------------------------------------------------  IN: 220 mL / OUT: 0 mL / NET: 220 mL      Physical Exam:  	Gen: NAD, well-appearing  	Pulm: CTA B/L  	CV: RRR, S1S2; no rub  	Abd: +BS, soft, nontender/nondistended  	LE: Warm, mild edema  	Neuro: follows commands  	Psych: Normal affect and mood  	Skin: Warm, without rashes    LABS/STUDIES  --------------------------------------------------------------------------------              12.9   9.6   >-----------<  134      [03-08-19 @ 11:55]              39.9     137  |  99  |  29  ----------------------------<  356      [03-09-19 @ 07:54]  4.5   |  20  |  2.34        Ca     9.2     [03-09-19 @ 07:54]    TPro  6.4  /  Alb  3.8  /  TBili  0.4  /  DBili  x   /  AST  20  /  ALT  16  /  AlkPhos  60  [03-09-19 @ 07:54]          Creatinine Trend:  SCr 2.34 [03-09 @ 07:54]  SCr 2.13 [03-08 @ 11:55]    Urinalysis - [03-08-19 @ 16:01]      Color Light Yellow / Appearance Clear / SG 1.016 / pH 6.5      Gluc 1000 mg/dL / Ketone Small  / Bili Negative / Urobili Negative       Blood Small / Protein 30 mg/dL / Leuk Est Negative / Nitrite Negative      RBC 1 / WBC 3 / Hyaline 0 / Gran  / Sq Epi  / Non Sq Epi 1 / Bacteria Negative      HbA1c 7.8      [04-13-17 @ 09:25]

## 2019-03-09 NOTE — CONSULT NOTE ADULT - SUBJECTIVE AND OBJECTIVE BOX
CHIEF COMPLAINT:Patient is a 63y old  Male who presents with a chief complaint of fever (08 Mar 2019 17:58)      HISTORY OF PRESENT ILLNESS:  63 male with history as below  ESRD s/p renal transplant 2011 @ [facility] (here as transplant nephro is here), T2DM, HTN, HLD, decreased visual acuity bilaterally p/w 2d fever, chills,  weakness, N/V x 3   he had chest pain after nausea  now resolved   no sob     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 and limited vision in left eye  Bell's palsy: 2004  ESRD (end stage renal disease): s/p renal transplant  Type 2 diabetes mellitus  Obesity  Hypertension  Dyslipidemia  Diabetic neuropathy associated with type 2 diabetes mellitus  Benign prostatic hypertrophy  History of detached retina repair: right eye  S/P TURP  S/P kidney transplant: 2011          MEDICATIONS:  aspirin enteric coated 81 milliGRAM(s) Oral daily  heparin  Injectable 5000 Unit(s) SubCutaneous every 8 hours  losartan 25 milliGRAM(s) Oral daily  metoprolol tartrate 100 milliGRAM(s) Oral two times a day  NIFEdipine XL 60 milliGRAM(s) Oral daily  tamsulosin 0.4 milliGRAM(s) Oral at bedtime    piperacillin/tazobactam IVPB. 3.375 Gram(s) IV Intermittent every 8 hours      acetaminophen   Tablet .. 650 milliGRAM(s) Oral every 6 hours PRN  ondansetron Injectable 4 milliGRAM(s) IV Push every 6 hours PRN      atorvastatin 20 milliGRAM(s) Oral at bedtime  dextrose 40% Gel 15 Gram(s) Oral once PRN  dextrose 50% Injectable 12.5 Gram(s) IV Push once  dextrose 50% Injectable 25 Gram(s) IV Push once  dextrose 50% Injectable 25 Gram(s) IV Push once  glucagon  Injectable 1 milliGRAM(s) IntraMuscular once PRN  insulin glargine Injectable (LANTUS) 10 Unit(s) SubCutaneous at bedtime  insulin lispro (HumaLOG) corrective regimen sliding scale   SubCutaneous three times a day before meals  insulin lispro Injectable (HumaLOG) 8 Unit(s) SubCutaneous three times a day before meals  predniSONE   Tablet 5 milliGRAM(s) Oral daily    calcium carbonate   1250 mG (OsCal) 1 Tablet(s) Oral two times a day  dextrose 5%. 1000 milliLiter(s) IV Continuous <Continuous>  mycophenolate mofetil 250 milliGRAM(s) Oral two times a day  tacrolimus 4 milliGRAM(s) Oral every 12 hours      FAMILY HISTORY:  No pertinent family history in first degree relatives      Non-contributory    SOCIAL HISTORY:    No tobacco, drugs or etoh    Allergies    No Known Drug Allergies  Seafood (Pruritus; Rash)    Intolerances    	    REVIEW OF SYSTEMS:  as above  The rest of the 14 points ROS reviewed and except above they are unremarkable.        PHYSICAL EXAM:  T(C): 37.7 (03-09-19 @ 05:43), Max: 39.3 (03-08-19 @ 19:48)  HR: 68 (03-09-19 @ 05:43) (63 - 90)  BP: 179/79 (03-09-19 @ 05:43) (107/83 - 193/72)  RR: 18 (03-09-19 @ 05:43) (17 - 20)  SpO2: 96% (03-09-19 @ 05:43) (95% - 100%)  Wt(kg): --  I&O's Summary    08 Mar 2019 07:01  -  09 Mar 2019 07:00  --------------------------------------------------------  IN: 0 mL / OUT: 550 mL / NET: -550 mL      JVP: Normal  Neck: supple  Lung: clear   CV: S1 S2 , Murmur:  Abd: soft  Ext: No edema  neuro: Awake / alert  Psych: flat affect  Skin: normal      LABS/DATA:    TELEMETRY: 	    ECG:  	   	  CARDIAC MARKERS:                                      12.9   9.6   )-----------( 134      ( 08 Mar 2019 11:55 )             39.9     03-08    134<L>  |  97  |  30<H>  ----------------------------<  406<H>  4.2   |  20<L>  |  2.13<H>    Ca    9.2      08 Mar 2019 11:55    TPro  6.8  /  Alb  3.8  /  TBili  0.4  /  DBili  x   /  AST  16  /  ALT  13  /  AlkPhos  64  03-08    proBNP:   Lipid Profile:   HgA1c:   TSH:

## 2019-03-09 NOTE — CONSULT NOTE ADULT - ASSESSMENT
Assessment  DMT2: 63y Male with DM T2 with hyperglycemia on basal bolus insulin at home, blood sugars running high, no hypoglycemic episode, eating meals, non compliant with low carb diet.  Fever: On medications, stable, monitored.  HTN: Controlled, no headaches, on medications.  ESRD: On hemodialysis, Monitor labs/BMP          Millie Altamirano MD  Cell: [phone]  Office: [phone]

## 2019-03-09 NOTE — CONSULT NOTE ADULT - ASSESSMENT
chest pain   likely due to nausea and esophagitis   check troponin and repeat in 3/6 hr as it will be high given his CKD   consider GI eval    Fever  work up as per ID     HTN  elevated  change metoprolol to labetalol    CKD  s/p transplant  fu with renal

## 2019-03-09 NOTE — PROGRESS NOTE ADULT - ASSESSMENT
63 M with DM and ESRD s/p renal transplant 2011 on Cellcept, Tacrolimus and Prednisone, admitted 3/7/19 with acute fevers. Associated vomiting and one episode diarrhea. Daughter-in-law with brief febrile illness one week prior.   Here 101.6F. Nontoxic but leukocytosis to 14 and worsening renal function  negative  UA, CXR, RVP.   abd/pelvis CT: non specific fat stranding surrounding the transplant kidney    renal transplant immunocompromised pt with sepsis, fever, leukocytosis, BENEDICTO and transplant  fat stranding, ?transplant pyelonephritis but u/a negative and pt asymptomatic  vs gastroenteritis in view of N/V/D but unremarkable CT  pt non toxic and feels better but today worsening WBC and renal function    * f/u the blood cx  * f/u the urine cx  * switch to meropenem 1 q 12 for now  * if diarrhea returns, GI PCR panel

## 2019-03-09 NOTE — CONSULT NOTE ADULT - SUBJECTIVE AND OBJECTIVE BOX
HPI:  63 M PMH ESRD s/p renal transplant 2011 @ [facility] (here as transplant nephro is here), T2DM, HTN, HLD, decreased visual acuity bilaterally p/w 2d fever, chills,  weakness, N/V x 3 days  without cough, dysuria, headache or abdominal pain. No other associated symptoms (08 Mar 2019 15:01)  Patient has history of diabetes, on basal bolus insulin at home, no recent hypoglycemic episodes, no polyuria polydipsia. Patient follows up with PCP for diabetes management.    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 and limited vision in left eye  Bell's palsy: 2004  ESRD (end stage renal disease): s/p renal transplant  Type 2 diabetes mellitus  Obesity  Hypertension  Dyslipidemia  Diabetic neuropathy associated with type 2 diabetes mellitus  Benign prostatic hypertrophy  History of detached retina repair: right eye  S/P TURP  S/P kidney transplant: 2011      FAMILY HISTORY:  No pertinent family history in first degree relatives      Social History:    Outpatient Medications:    MEDICATIONS  (STANDING):  aspirin enteric coated 81 milliGRAM(s) Oral daily  atorvastatin 20 milliGRAM(s) Oral at bedtime  calcium carbonate   1250 mG (OsCal) 1 Tablet(s) Oral two times a day  dextrose 5%. 1000 milliLiter(s) (50 mL/Hr) IV Continuous <Continuous>  dextrose 50% Injectable 12.5 Gram(s) IV Push once  dextrose 50% Injectable 25 Gram(s) IV Push once  dextrose 50% Injectable 25 Gram(s) IV Push once  heparin  Injectable 5000 Unit(s) SubCutaneous every 8 hours  insulin glargine Injectable (LANTUS) 22 Unit(s) SubCutaneous at bedtime  insulin lispro (HumaLOG) corrective regimen sliding scale   SubCutaneous three times a day before meals  insulin lispro Injectable (HumaLOG) 14 Unit(s) SubCutaneous three times a day before meals  labetalol 200 milliGRAM(s) Oral three times a day  meropenem  IVPB 1000 milliGRAM(s) IV Intermittent every 12 hours  NIFEdipine XL 60 milliGRAM(s) Oral daily  predniSONE   Tablet 5 milliGRAM(s) Oral daily  tacrolimus 4 milliGRAM(s) Oral every 12 hours  tamsulosin 0.4 milliGRAM(s) Oral at bedtime    MEDICATIONS  (PRN):  acetaminophen   Tablet .. 650 milliGRAM(s) Oral every 6 hours PRN Temp greater or equal to 38C (100.4F)  dextrose 40% Gel 15 Gram(s) Oral once PRN Blood Glucose LESS THAN 70 milliGRAM(s)/deciliter  glucagon  Injectable 1 milliGRAM(s) IntraMuscular once PRN Glucose LESS THAN 70 milligrams/deciliter  ondansetron Injectable 4 milliGRAM(s) IV Push every 6 hours PRN Nausea      Allergies    No Known Drug Allergies  Seafood (Pruritus; Rash)    Intolerances      Review of Systems:  Constitutional: No fever, no chills  Eyes: No blurry vision  Neuro: No tremors  HEENT: No pain, no neck swelling  Cardiovascular: No chest pain, no palpitations  Respiratory: Has SOB, no cough  GI: No nausea, vomiting, abdominal pain  : No dysuria  Skin: no rash  MSK: Has leg swelling.  Psych: no depression  Endocrine: no polyuria, polydipsia    ALL OTHER SYSTEMS REVIEWED AND NEGATIVE    UNABLE TO OBTAIN    PHYSICAL EXAM:  VITALS: T(C): 37 (03-09-19 @ 14:19)  T(F): 98.6 (03-09-19 @ 14:19), Max: 101.5 (03-08-19 @ 21:58)  HR: 73 (03-09-19 @ 14:19) (63 - 85)  BP: 138/71 (03-09-19 @ 14:19) (138/71 - 181/92)  RR:  (17 - 18)  SpO2:  (94% - 96%)  Wt(kg): --  GENERAL: NAD, well-groomed, well-developed  EYES: No proptosis, no lid lag  HEENT:  Atraumatic, Normocephalic  THYROID: Normal size, no palpable nodules  RESPIRATORY: Clear to auscultation bilaterally; No rales, rhonchi, wheezing  CARDIOVASCULAR: Si S2, No murmurs;  GI: Soft, non distended, normal bowel sounds  SKIN: Dry, intact, No rashes or lesions  MUSCULOSKELETAL: Has BL lower extremity edema.  NEURO:  no tremor, sensation decreased in feet BL,    POCT Blood Glucose.: 277 mg/dL (03-09-19 @ 20:20)  POCT Blood Glucose.: 321 mg/dL (03-09-19 @ 17:12)  POCT Blood Glucose.: 348 mg/dL (03-09-19 @ 12:10)  POCT Blood Glucose.: 341 mg/dL (03-09-19 @ 08:34)  POCT Blood Glucose.: 378 mg/dL (03-08-19 @ 22:25)  POCT Blood Glucose.: 403 mg/dL (03-08-19 @ 22:23)  POCT Blood Glucose.: 385 mg/dL (03-08-19 @ 17:57)  POCT Blood Glucose.: 371 mg/dL (03-08-19 @ 16:31)  POCT Blood Glucose.: 362 mg/dL (03-08-19 @ 11:15)                            12.3   14.04 )-----------( 149      ( 09 Mar 2019 10:23 )             42.3       03-09    137  |  99  |  29<H>  ----------------------------<  356<H>  4.5   |  20<L>  |  2.34<H>    EGFR if : 33<L>  EGFR if non : 28<L>    Ca    9.2      03-09    TPro  6.4  /  Alb  3.8  /  TBili  0.4  /  DBili  x   /  AST  20  /  ALT  16  /  AlkPhos  60  03-09      Thyroid Function Tests:      Hemoglobin A1C, Whole Blood: 9.2 % <H> [4.0 - 5.6] (03-09-19 @ 10:23)          Radiology:

## 2019-03-09 NOTE — PROGRESS NOTE ADULT - SUBJECTIVE AND OBJECTIVE BOX
Follow Up:  renal transplant pt with fever    Interval History: pt feels better but still febrile today    ROS:      All other systems negative    Constitutional: + fever,  chills  Head: no trauma  Eyes: no vision changes, no eye pain  ENT:  no sore throat, no rhinorrhea  Cardiovascular:  no chest pain, no palpitation  Respiratory:  no SOB, no cough  GI:  no abd pain, no vomiting, no diarrhea  urinary: no dysuria, no hematuria, no flank pain  musculoskeletal:  no joint pain, no joint swelling  skin:  no rash  neurology:  no headache, no seizure, no change in mental status  psych: no anxiety, no depression         Allergies  No Known Drug Allergies  Seafood (Pruritus; Rash)        ANTIMICROBIALS:  piperacillin/tazobactam IVPB. 3.375 every 8 hours      OTHER MEDS:  acetaminophen   Tablet .. 650 milliGRAM(s) Oral every 6 hours PRN  aspirin enteric coated 81 milliGRAM(s) Oral daily  atorvastatin 20 milliGRAM(s) Oral at bedtime  calcium carbonate   1250 mG (OsCal) 1 Tablet(s) Oral two times a day  dextrose 40% Gel 15 Gram(s) Oral once PRN  dextrose 5%. 1000 milliLiter(s) IV Continuous <Continuous>  dextrose 50% Injectable 12.5 Gram(s) IV Push once  dextrose 50% Injectable 25 Gram(s) IV Push once  dextrose 50% Injectable 25 Gram(s) IV Push once  glucagon  Injectable 1 milliGRAM(s) IntraMuscular once PRN  heparin  Injectable 5000 Unit(s) SubCutaneous every 8 hours  insulin glargine Injectable (LANTUS) 10 Unit(s) SubCutaneous at bedtime  insulin lispro (HumaLOG) corrective regimen sliding scale   SubCutaneous three times a day before meals  insulin lispro Injectable (HumaLOG) 8 Unit(s) SubCutaneous three times a day before meals  labetalol 200 milliGRAM(s) Oral three times a day  NIFEdipine XL 60 milliGRAM(s) Oral daily  ondansetron Injectable 4 milliGRAM(s) IV Push every 6 hours PRN  predniSONE   Tablet 5 milliGRAM(s) Oral daily  tacrolimus 4 milliGRAM(s) Oral every 12 hours  tamsulosin 0.4 milliGRAM(s) Oral at bedtime      Vital Signs Last 24 Hrs  T(C): 36.9 (09 Mar 2019 09:52), Max: 39.3 (08 Mar 2019 19:48)  T(F): 98.4 (09 Mar 2019 09:52), Max: 102.7 (08 Mar 2019 19:48)  HR: 77 (09 Mar 2019 07:20) (63 - 90)  BP: 176/84 (09 Mar 2019 07:20) (149/68 - 193/72)  BP(mean): --  RR: 18 (09 Mar 2019 07:20) (17 - 18)  SpO2: 96% (09 Mar 2019 07:20) (95% - 97%)    Physical Exam:  General:    NAD,  non toxic  Head: atraumatic, normocephalic  Eye: impaired vision  ENT:    no oropharyngeal lesions,   no LAD,   neck supple  Cardio:     regular S1, S2,  no murmur  Respiratory:    clear b/l,    no wheezing  abd:     soft,   BS +,   no tenderness,    no organomegaly  :   no CVAT,  no suprapubic tenderness,   no  gilmore, R pelvis transplant with no tenderness  Musculoskeletal:   no joint swelling,   no edema  vascular: no lines, normal pulses  Skin:    no rash  Neurologic:     no focal deficit  psych: normal affect                          12.3   14.04 )-----------( 149      ( 09 Mar 2019 10:23 )             42.3           137  |  99  |  29<H>  ----------------------------<  356<H>  4.5   |  20<L>  |  2.34<H>    Ca    9.2      09 Mar 2019 07:54    TPro  6.4  /  Alb  3.8  /  TBili  0.4  /  DBili  x   /  AST  20  /  ALT  16  /  AlkPhos  60        Urinalysis Basic - ( 08 Mar 2019 16:01 )    Color: Light Yellow / Appearance: Clear / S.016 / pH: x  Gluc: x / Ketone: Small  / Bili: Negative / Urobili: Negative   Blood: x / Protein: 30 mg/dL / Nitrite: Negative   Leuk Esterase: Negative / RBC: 1 /hpf / WBC 3 /HPF   Sq Epi: x / Non Sq Epi: 1 /hpf / Bacteria: Negative        MICROBIOLOGY:  v      Rapid RVP Result: NotDetec ( @ 11:59)        RADIOLOGY:  Images below reviewed personally  < from: CT Abdomen and Pelvis No Cont (19 @ 20:05) >  IMPRESSION:     Right pelvic transplant kidney with nonspecific surrounding fat   stranding.No hydronephrosis. In the setting of fever, consider   pyelonephritis. Correlation with urinalysis is recommended.    No appendicitis, colitis, or bowel obstruction.

## 2019-03-09 NOTE — PROGRESS NOTE ADULT - PROBLEM SELECTOR PLAN 1
Patient with known history of CKD in setting of DDRT, DM, and HTN. Baseline Scr appears to be between 1.6-1.8. Latest Scr. is now elevated at 2.13. Patient with hemodynamically mediated BENEDICTO from vomiting and ARB use? Continue with IV hydration. Check UA, urine electrolytes, and renal ultrasound with doppler. Check 12 hour tacrolimus level as mentioned below. Recommend to hold ARB therapy for now. Monitor BMP daily. Avoid NSAIDs, RCAS, and other nephrotoxins. Renally dose all medications as per eGFR. BENEDICTO on CKD in setting of DDRT, DM, and HTN, likely hemodynamically mediated from vomiting, ARB use, infection. Baseline Scr ~1.6-1.8. SCr still elevated in 2 range. Non-oliguric.   - Can hold off IVF for now if pt tolerating PO.  - Check renal ultrasound with doppler.   - Dose zosyn renally   - Continue to hold ARB.   - Monitor BMP  - Avoid NSAIDs, RCAS, and other nephrotoxins.

## 2019-03-09 NOTE — PROGRESS NOTE ADULT - PROBLEM SELECTOR PLAN 2
Patient with h/o DDRT in 2011 and is on tacrolimus 4/4,  mg BID, and prednisone 5 mg daily. Recommend to hold MMF for now in view of GI complaints and underlying infection. Continue with outpatient dose of immunosuppression. Check 12 hour tacrolimus trough( level to be drawn 30 minutes prior to AM dose). Monitor daily tacrolimus levels. DDRT in 2011, on tacrolimus 4/4,  mg BID, and prednisone 5 mg daily.   - HOLD MMF in setting of infection and GI complaints. Pt continues to spike temperatures.   - C/w tacro 4/4. Check tacrolimus trough (30 minutes prior to AM dose) DAILY.  - C/w prednisone

## 2019-03-09 NOTE — PROGRESS NOTE ADULT - ASSESSMENT
63 year old male with h/o DDRT in 2011, CKD(baseline 1.6-1.8) found to have BENEDICTO on CKD in setting of vomiting, infection and ARB use. 63 year old male with h/o DDRT in 2011, CKD (baseline sCr 1.6-1.8) found to have BENEDICTO on CKD in setting of vomiting, infection and ARB use.

## 2019-03-09 NOTE — PROGRESS NOTE ADULT - ASSESSMENT
63 M PMH ESRD s/p renal transplant 2011 @ [facility] (here as transplant nephro is here), T2DM, HTN, HLD, decreased visual acuity bilaterally p/w 2d fever, chills,  weakness, N/V x 3 days  without cough, dysuria, headache or abdominal pain. No other associated symptoms      Problem/Plan - 1:  ·  Problem: Fever of unknown origin (FUO).  Plan: unclear etiology, ? pylonephritis  fu cultures  ID following  will monitor.     Problem/Plan - 2:  ·  Problem: BENEDICTO (acute kidney injury).  Plan: monitor cr  renal fu   sp renal transplant  cw immunosuppressant.     Problem/Plan - 3:  ·  Problem: Type 2 diabetes mellitus.  Plan: unconrolled   monitor FS  Basal/bolus.   endo fu     Problem/Plan - 4:  ·  Problem: Hypertension.  Plan: cw home meds.     Problem/Plan - 5:  ·  Problem: Benign prostatic hypertrophy.  Plan: cw tamsulosin.

## 2019-03-10 LAB
GLUCOSE BLDC GLUCOMTR-MCNC: 132 MG/DL — HIGH (ref 70–99)
GLUCOSE BLDC GLUCOMTR-MCNC: 199 MG/DL — HIGH (ref 70–99)
GLUCOSE BLDC GLUCOMTR-MCNC: 254 MG/DL — HIGH (ref 70–99)
GLUCOSE BLDC GLUCOMTR-MCNC: 293 MG/DL — HIGH (ref 70–99)
TACROLIMUS SERPL-MCNC: 5.8 NG/ML — SIGNIFICANT CHANGE UP
TROPONIN T, HIGH SENSITIVITY RESULT: 36 NG/L — SIGNIFICANT CHANGE UP (ref 0–51)

## 2019-03-10 PROCEDURE — 99233 SBSQ HOSP IP/OBS HIGH 50: CPT | Mod: GC

## 2019-03-10 RX ORDER — LOSARTAN POTASSIUM 100 MG/1
1 TABLET, FILM COATED ORAL
Qty: 0 | Refills: 0 | COMMUNITY

## 2019-03-10 RX ORDER — INSULIN GLARGINE 100 [IU]/ML
30 INJECTION, SOLUTION SUBCUTANEOUS AT BEDTIME
Qty: 0 | Refills: 0 | Status: DISCONTINUED | OUTPATIENT
Start: 2019-03-10 | End: 2019-03-16

## 2019-03-10 RX ORDER — VANCOMYCIN HCL 1 G
1000 VIAL (EA) INTRAVENOUS ONCE
Qty: 0 | Refills: 0 | Status: COMPLETED | OUTPATIENT
Start: 2019-03-10 | End: 2019-03-10

## 2019-03-10 RX ADMIN — Medication 14 UNIT(S): at 12:39

## 2019-03-10 RX ADMIN — TACROLIMUS 4 MILLIGRAM(S): 5 CAPSULE ORAL at 06:32

## 2019-03-10 RX ADMIN — TAMSULOSIN HYDROCHLORIDE 0.4 MILLIGRAM(S): 0.4 CAPSULE ORAL at 22:08

## 2019-03-10 RX ADMIN — Medication 14 UNIT(S): at 08:40

## 2019-03-10 RX ADMIN — HEPARIN SODIUM 5000 UNIT(S): 5000 INJECTION INTRAVENOUS; SUBCUTANEOUS at 06:32

## 2019-03-10 RX ADMIN — Medication 200 MILLIGRAM(S): at 10:02

## 2019-03-10 RX ADMIN — Medication 1 TABLET(S): at 17:22

## 2019-03-10 RX ADMIN — Medication 5 MILLIGRAM(S): at 06:31

## 2019-03-10 RX ADMIN — Medication 3: at 08:40

## 2019-03-10 RX ADMIN — Medication 200 MILLIGRAM(S): at 22:08

## 2019-03-10 RX ADMIN — INSULIN GLARGINE 30 UNIT(S): 100 INJECTION, SOLUTION SUBCUTANEOUS at 22:07

## 2019-03-10 RX ADMIN — Medication 250 MILLIGRAM(S): at 10:02

## 2019-03-10 RX ADMIN — MEROPENEM 100 MILLIGRAM(S): 1 INJECTION INTRAVENOUS at 18:12

## 2019-03-10 RX ADMIN — HEPARIN SODIUM 5000 UNIT(S): 5000 INJECTION INTRAVENOUS; SUBCUTANEOUS at 22:08

## 2019-03-10 RX ADMIN — HEPARIN SODIUM 5000 UNIT(S): 5000 INJECTION INTRAVENOUS; SUBCUTANEOUS at 12:42

## 2019-03-10 RX ADMIN — Medication 81 MILLIGRAM(S): at 12:42

## 2019-03-10 RX ADMIN — TACROLIMUS 4 MILLIGRAM(S): 5 CAPSULE ORAL at 17:22

## 2019-03-10 RX ADMIN — ATORVASTATIN CALCIUM 20 MILLIGRAM(S): 80 TABLET, FILM COATED ORAL at 22:08

## 2019-03-10 RX ADMIN — Medication 14 UNIT(S): at 17:19

## 2019-03-10 RX ADMIN — Medication 1 TABLET(S): at 06:32

## 2019-03-10 RX ADMIN — Medication 1: at 17:19

## 2019-03-10 RX ADMIN — MEROPENEM 100 MILLIGRAM(S): 1 INJECTION INTRAVENOUS at 06:35

## 2019-03-10 RX ADMIN — Medication 60 MILLIGRAM(S): at 06:31

## 2019-03-10 RX ADMIN — Medication 3: at 12:39

## 2019-03-10 NOTE — PROGRESS NOTE ADULT - SUBJECTIVE AND OBJECTIVE BOX
Patient is a 63y old  Male who presents with a chief complaint of fever (10 Mar 2019 10:48)      INTERVAL HPI/OVERNIGHT EVENTS:  T(C): 36.8 (03-10-19 @ 17:24), Max: 36.9 (03-09-19 @ 23:30)  HR: 78 (03-10-19 @ 17:24) (78 - 85)  BP: 154/74 (03-10-19 @ 17:24) (122/65 - 154/74)  RR: 18 (03-10-19 @ 17:24) (18 - 18)  SpO2: 96% (03-10-19 @ 17:24) (95% - 97%)  Wt(kg): --  I&O's Summary    09 Mar 2019 06:01  -  10 Mar 2019 07:00  --------------------------------------------------------  IN: 540 mL / OUT: 470 mL / NET: 70 mL    10 Mar 2019 07:01  -  10 Mar 2019 19:25  --------------------------------------------------------  IN: 600 mL / OUT: 200 mL / NET: 400 mL        LABS:                        12.3   14.04 )-----------( 149      ( 09 Mar 2019 10:23 )             42.3     03-09    137  |  99  |  29<H>  ----------------------------<  356<H>  4.5   |  20<L>  |  2.34<H>    Ca    9.2      09 Mar 2019 07:54    TPro  6.4  /  Alb  3.8  /  TBili  0.4  /  DBili  x   /  AST  20  /  ALT  16  /  AlkPhos  60  03-09        CAPILLARY BLOOD GLUCOSE      POCT Blood Glucose.: 199 mg/dL (10 Mar 2019 17:01)  POCT Blood Glucose.: 293 mg/dL (10 Mar 2019 12:10)  POCT Blood Glucose.: 254 mg/dL (10 Mar 2019 08:21)  POCT Blood Glucose.: 247 mg/dL (09 Mar 2019 21:30)  POCT Blood Glucose.: 277 mg/dL (09 Mar 2019 20:20)            MEDICATIONS  (STANDING):  aspirin enteric coated 81 milliGRAM(s) Oral daily  atorvastatin 20 milliGRAM(s) Oral at bedtime  calcium carbonate   1250 mG (OsCal) 1 Tablet(s) Oral two times a day  dextrose 5%. 1000 milliLiter(s) (50 mL/Hr) IV Continuous <Continuous>  dextrose 50% Injectable 12.5 Gram(s) IV Push once  dextrose 50% Injectable 25 Gram(s) IV Push once  dextrose 50% Injectable 25 Gram(s) IV Push once  heparin  Injectable 5000 Unit(s) SubCutaneous every 8 hours  insulin glargine Injectable (LANTUS) 30 Unit(s) SubCutaneous at bedtime  insulin lispro (HumaLOG) corrective regimen sliding scale   SubCutaneous three times a day before meals  insulin lispro Injectable (HumaLOG) 14 Unit(s) SubCutaneous three times a day before meals  labetalol 200 milliGRAM(s) Oral three times a day  meropenem  IVPB 1000 milliGRAM(s) IV Intermittent every 12 hours  NIFEdipine XL 60 milliGRAM(s) Oral daily  predniSONE   Tablet 5 milliGRAM(s) Oral daily  tacrolimus 4 milliGRAM(s) Oral every 12 hours  tamsulosin 0.4 milliGRAM(s) Oral at bedtime    MEDICATIONS  (PRN):  acetaminophen   Tablet .. 650 milliGRAM(s) Oral every 6 hours PRN Temp greater or equal to 38C (100.4F)  dextrose 40% Gel 15 Gram(s) Oral once PRN Blood Glucose LESS THAN 70 milliGRAM(s)/deciliter  glucagon  Injectable 1 milliGRAM(s) IntraMuscular once PRN Glucose LESS THAN 70 milligrams/deciliter  ondansetron Injectable 4 milliGRAM(s) IV Push every 6 hours PRN Nausea          PHYSICAL EXAM:  GENERAL: NAD, well-groomed, well-developed  HEAD:  Atraumatic, Normocephalic  CHEST/LUNG: Clear to percussion bilaterally; No rales, rhonchi, wheezing, or rubs  HEART: Regular rate and rhythm; No murmurs, rubs, or gallops  ABDOMEN: Soft, Nontender, Nondistended; Bowel sounds present  EXTREMITIES:  2+ Peripheral Pulses, No clubbing, cyanosis, or edema  LYMPH: No lymphadenopathy noted  SKIN: No rashes or lesions    Care Discussed with Consultants/Other Providers [ ] YES  [ ] NO

## 2019-03-10 NOTE — PROGRESS NOTE ADULT - ASSESSMENT
chest pain   likely due to nausea and esophagitis   trop indeterminant , repeat   consider GI eval    Fever  work up as per ID     HTN  better now     CKD  s/p transplant  fu with renal

## 2019-03-10 NOTE — PROGRESS NOTE ADULT - ASSESSMENT
Assessment  DMT2: 63y Male with DM T2 with hyperglycemia on basal bolus insulin at home, blood sugars running high, no hypoglycemic episode, eating meals, non compliant with low carb diet.  Fever: On medications, stable, monitored.  HTN: Controlled, no headaches, on medications.  ESRD: On hemodialysis, Monitor labs/BMP          Millie Altamirano MD  Cell: [phone]  Office: [phone]

## 2019-03-10 NOTE — PROGRESS NOTE ADULT - SUBJECTIVE AND OBJECTIVE BOX
Chief complaint  Patient is a 63y old  Male who presents with a chief complaint of fever (10 Mar 2019 19:25)   Review of systems  Patient in bed, looks comfortable, no fever, no hypoglycemia.    Labs and Fingersticks  CAPILLARY BLOOD GLUCOSE      POCT Blood Glucose.: 199 mg/dL (10 Mar 2019 17:01)  POCT Blood Glucose.: 293 mg/dL (10 Mar 2019 12:10)  POCT Blood Glucose.: 254 mg/dL (10 Mar 2019 08:21)  POCT Blood Glucose.: 247 mg/dL (09 Mar 2019 21:30)  POCT Blood Glucose.: 277 mg/dL (09 Mar 2019 20:20)      Anion Gap, Serum: 18 <H> (03-09 @ 07:54)    Hemoglobin A1C, Whole Blood: 9.2 <H> (03-09 @ 10:23)    Calcium, Total Serum: 9.2 (03-09 @ 07:54)  Albumin, Serum: 3.8 (03-09 @ 07:54)    Alanine Aminotransferase (ALT/SGPT): 16 (03-09 @ 07:54)  Alkaline Phosphatase, Serum: 60 (03-09 @ 07:54)  Aspartate Aminotransferase (AST/SGOT): 20 (03-09 @ 07:54)        03-09    137  |  99  |  29<H>  ----------------------------<  356<H>  4.5   |  20<L>  |  2.34<H>    Ca    9.2      09 Mar 2019 07:54    TPro  6.4  /  Alb  3.8  /  TBili  0.4  /  DBili  x   /  AST  20  /  ALT  16  /  AlkPhos  60  03-09                        12.3   14.04 )-----------( 149      ( 09 Mar 2019 10:23 )             42.3     Medications  MEDICATIONS  (STANDING):  aspirin enteric coated 81 milliGRAM(s) Oral daily  atorvastatin 20 milliGRAM(s) Oral at bedtime  calcium carbonate   1250 mG (OsCal) 1 Tablet(s) Oral two times a day  dextrose 5%. 1000 milliLiter(s) (50 mL/Hr) IV Continuous <Continuous>  dextrose 50% Injectable 12.5 Gram(s) IV Push once  dextrose 50% Injectable 25 Gram(s) IV Push once  dextrose 50% Injectable 25 Gram(s) IV Push once  heparin  Injectable 5000 Unit(s) SubCutaneous every 8 hours  insulin glargine Injectable (LANTUS) 30 Unit(s) SubCutaneous at bedtime  insulin lispro (HumaLOG) corrective regimen sliding scale   SubCutaneous three times a day before meals  insulin lispro Injectable (HumaLOG) 14 Unit(s) SubCutaneous three times a day before meals  labetalol 200 milliGRAM(s) Oral three times a day  meropenem  IVPB 1000 milliGRAM(s) IV Intermittent every 12 hours  NIFEdipine XL 60 milliGRAM(s) Oral daily  predniSONE   Tablet 5 milliGRAM(s) Oral daily  tacrolimus 4 milliGRAM(s) Oral every 12 hours  tamsulosin 0.4 milliGRAM(s) Oral at bedtime      Physical Exam  General: Patient comfortable in bed  Vital Signs Last 12 Hrs  T(F): 98.3 (03-10-19 @ 17:24), Max: 98.3 (03-10-19 @ 17:24)  HR: 78 (03-10-19 @ 17:24) (78 - 85)  BP: 154/74 (03-10-19 @ 17:24) (141/74 - 154/74)  BP(mean): --  RR: 18 (03-10-19 @ 17:24) (18 - 18)  SpO2: 96% (03-10-19 @ 17:24) (95% - 96%)  Neck: No palpable thyroid nodules.  CVS: S1S2, No murmurs  Respiratory: No wheezing, no crepitations  GI: Abdomen soft, bowel sounds positive  Musculoskeletal:  edema lower extremities.   Skin: No skin rashes, no ecchymosis    Diagnostics

## 2019-03-10 NOTE — PROGRESS NOTE ADULT - ASSESSMENT
HTN, HLD, decreased visual acuity bilaterally p/w 2d fever, chills,  weakness, N/V x 3 days  without cough, dysuria, headache or abdominal pain. No other associated symptoms      Problem/Plan - 1:  ·  Problem: severe sepsis secondary to bacteremia and pyonephritis    Plan: cw current antibiotics   fu cultures  ID following  will monitor.     Problem/Plan - 2:  ·  Problem: BENEDICTO (acute kidney injury).  Plan: monitor cr  renal fu   sp renal transplant  cw immunosuppressant.     Problem/Plan - 3:  ·  Problem: Type 2 diabetes mellitus.  Plan: unconrolled   monitor FS  Basal/bolus.   endo fu

## 2019-03-10 NOTE — PROGRESS NOTE ADULT - SUBJECTIVE AND OBJECTIVE BOX
Follow Up:  renal transplant pt with fever    Interval History: no more fevers but blood cx with high grade coag neg staph and urine with coag neg staph    ROS:      All other systems negative    Constitutional:  nofever,  chills  Head: no trauma  Eyes: no vision changes, no eye pain  ENT:  no sore throat, no rhinorrhea  Cardiovascular:  no chest pain, no palpitation  Respiratory:  no SOB, no cough  GI:  no abd pain, no vomiting, no diarrhea  urinary: no dysuria, no hematuria, no flank pain  musculoskeletal:  no joint pain, no joint swelling  skin:  no rash  neurology:  no headache, no seizure, no change in mental status  psych: no anxiety, no depression       Allergies  No Known Drug Allergies  Seafood (Pruritus; Rash)        ANTIMICROBIALS:  meropenem  IVPB 1000 every 12 hours      OTHER MEDS:  acetaminophen   Tablet .. 650 milliGRAM(s) Oral every 6 hours PRN  aspirin enteric coated 81 milliGRAM(s) Oral daily  atorvastatin 20 milliGRAM(s) Oral at bedtime  calcium carbonate   1250 mG (OsCal) 1 Tablet(s) Oral two times a day  dextrose 40% Gel 15 Gram(s) Oral once PRN  dextrose 5%. 1000 milliLiter(s) IV Continuous <Continuous>  dextrose 50% Injectable 12.5 Gram(s) IV Push once  dextrose 50% Injectable 25 Gram(s) IV Push once  dextrose 50% Injectable 25 Gram(s) IV Push once  glucagon  Injectable 1 milliGRAM(s) IntraMuscular once PRN  heparin  Injectable 5000 Unit(s) SubCutaneous every 8 hours  insulin glargine Injectable (LANTUS) 30 Unit(s) SubCutaneous at bedtime  insulin lispro (HumaLOG) corrective regimen sliding scale   SubCutaneous three times a day before meals  insulin lispro Injectable (HumaLOG) 14 Unit(s) SubCutaneous three times a day before meals  labetalol 200 milliGRAM(s) Oral three times a day  NIFEdipine XL 60 milliGRAM(s) Oral daily  ondansetron Injectable 4 milliGRAM(s) IV Push every 6 hours PRN  predniSONE   Tablet 5 milliGRAM(s) Oral daily  tacrolimus 4 milliGRAM(s) Oral every 12 hours  tamsulosin 0.4 milliGRAM(s) Oral at bedtime      Vital Signs Last 24 Hrs  T(C): 36.8 (10 Mar 2019 10:09), Max: 37 (09 Mar 2019 14:19)  T(F): 98.2 (10 Mar 2019 10:09), Max: 98.6 (09 Mar 2019 14:19)  HR: 85 (10 Mar 2019 10:09) (73 - 85)  BP: 141/74 (10 Mar 2019 10:09) (122/65 - 141/74)  BP(mean): --  RR: 18 (10 Mar 2019 10:09) (18 - 18)  SpO2: 95% (10 Mar 2019 10:09) (94% - 97%)    Physical Exam:  General:    NAD,  non toxic  Head: atraumatic, normocephalic  Eye: impaired vision  ENT:    no oropharyngeal lesions,   no LAD,   neck supple  Cardio:     regular S1, S2,  no murmur  Respiratory:    clear b/l,    no wheezing  abd:     soft,   BS +,   no tenderness,    no organomegaly  :   no CVAT,  no suprapubic tenderness,   no  gilmore, R pelvis transplant with no tenderness  Musculoskeletal:   no joint swelling,   no edema  vascular: no lines, normal pulses  Skin:    no rash  Neurologic:     no focal deficit  psych: normal affect                            12.3   14.04 )-----------( 149      ( 09 Mar 2019 10:23 )             42.3       03    137  |  99  |  29<H>  ----------------------------<  356<H>  4.5   |  20<L>  |  2.34<H>    Ca    9.2      09 Mar 2019 07:54    TPro  6.4  /  Alb  3.8  /  TBili  0.4  /  DBili  x   /  AST  20  /  ALT  16  /  AlkPhos  60        Urinalysis Basic - ( 08 Mar 2019 16:01 )    Color: Light Yellow / Appearance: Clear / S.016 / pH: x  Gluc: x / Ketone: Small  / Bili: Negative / Urobili: Negative   Blood: x / Protein: 30 mg/dL / Nitrite: Negative   Leuk Esterase: Negative / RBC: 1 /hpf / WBC 3 /HPF   Sq Epi: x / Non Sq Epi: 1 /hpf / Bacteria: Negative        MICROBIOLOGY:  v  .Urine Clean Catch (Midstream)  19   >100,000 CFU/ml Coag Negative Staphylococcus  "Susceptibilities not performed"  --  --      .Blood Blood  19   Growth in aerobic and anaerobic bottles: Gram Positive Cocci in Clusters  --  Blood Culture PCR          Rapid RVP Result: NotDetec ( @ 11:59)        RADIOLOGY:  Images below reviewed personally  < from: CT Abdomen and Pelvis No Cont (19 @ 20:05) >  IMPRESSION:     Right pelvic transplant kidney with nonspecific surrounding fat   stranding.No hydronephrosis. In the setting of fever, consider   pyelonephritis. Correlation with urinalysis is recommended.    No appendicitis, colitis, or bowel obstruction.

## 2019-03-10 NOTE — PROGRESS NOTE ADULT - ASSESSMENT
63 M with DM and ESRD s/p renal transplant 2011 on Cellcept, Tacrolimus and Prednisone, admitted 3/7/19 with acute fevers. Associated vomiting and one episode diarrhea. Daughter-in-law with brief febrile illness one week prior.   Here 101.6F. Nontoxic but leukocytosis to 14 and worsening renal function  negative  UA, CXR, RVP.   abd/pelvis CT: non specific fat stranding surrounding the transplant kidney  blood cx: high grade coag neg staph, urine also coag neg staph    renal transplant immunocompromised pt with sepsis, fever, leukocytosis, BENEDICTO and transplant  fat stranding  high grade coag neg staph bacteremia and ?UTI and transplant pyelonephritis       * repeat  the blood cx  * vanco 1 g stat and then per level  * check vanco level daily, if <15 redose  * c/w meropenem 1 q 12 for now

## 2019-03-11 DIAGNOSIS — Z71.89 OTHER SPECIFIED COUNSELING: ICD-10-CM

## 2019-03-11 DIAGNOSIS — A41.9 SEPSIS, UNSPECIFIED ORGANISM: ICD-10-CM

## 2019-03-11 LAB
-  AMPICILLIN/SULBACTAM: SIGNIFICANT CHANGE UP
-  CEFAZOLIN: SIGNIFICANT CHANGE UP
-  CLINDAMYCIN: SIGNIFICANT CHANGE UP
-  ERYTHROMYCIN: SIGNIFICANT CHANGE UP
-  GENTAMICIN: SIGNIFICANT CHANGE UP
-  OXACILLIN: SIGNIFICANT CHANGE UP
-  PENICILLIN: SIGNIFICANT CHANGE UP
-  RIFAMPIN: SIGNIFICANT CHANGE UP
-  TETRACYCLINE: SIGNIFICANT CHANGE UP
-  TRIMETHOPRIM/SULFAMETHOXAZOLE: SIGNIFICANT CHANGE UP
-  VANCOMYCIN: SIGNIFICANT CHANGE UP
ANION GAP SERPL CALC-SCNC: 16 MMOL/L — SIGNIFICANT CHANGE UP (ref 5–17)
BUN SERPL-MCNC: 33 MG/DL — HIGH (ref 7–23)
CALCIUM SERPL-MCNC: 9.3 MG/DL — SIGNIFICANT CHANGE UP (ref 8.4–10.5)
CHLORIDE SERPL-SCNC: 102 MMOL/L — SIGNIFICANT CHANGE UP (ref 96–108)
CO2 SERPL-SCNC: 21 MMOL/L — LOW (ref 22–31)
CREAT SERPL-MCNC: 2.45 MG/DL — HIGH (ref 0.5–1.3)
CULTURE RESULTS: SIGNIFICANT CHANGE UP
CULTURE RESULTS: SIGNIFICANT CHANGE UP
GLUCOSE BLDC GLUCOMTR-MCNC: 142 MG/DL — HIGH (ref 70–99)
GLUCOSE BLDC GLUCOMTR-MCNC: 152 MG/DL — HIGH (ref 70–99)
GLUCOSE BLDC GLUCOMTR-MCNC: 170 MG/DL — HIGH (ref 70–99)
GLUCOSE BLDC GLUCOMTR-MCNC: 259 MG/DL — HIGH (ref 70–99)
GLUCOSE BLDC GLUCOMTR-MCNC: 77 MG/DL — SIGNIFICANT CHANGE UP (ref 70–99)
GLUCOSE SERPL-MCNC: 157 MG/DL — HIGH (ref 70–99)
HCT VFR BLD CALC: 40 % — SIGNIFICANT CHANGE UP (ref 39–50)
HGB BLD-MCNC: 11.8 G/DL — LOW (ref 13–17)
MCHC RBC-ENTMCNC: 21.6 PG — LOW (ref 27–34)
MCHC RBC-ENTMCNC: 29.5 GM/DL — LOW (ref 32–36)
MCV RBC AUTO: 73.1 FL — LOW (ref 80–100)
METHOD TYPE: SIGNIFICANT CHANGE UP
ORGANISM # SPEC MICROSCOPIC CNT: SIGNIFICANT CHANGE UP
PLATELET # BLD AUTO: 181 K/UL — SIGNIFICANT CHANGE UP (ref 150–400)
POTASSIUM SERPL-MCNC: 3.4 MMOL/L — LOW (ref 3.5–5.3)
POTASSIUM SERPL-SCNC: 3.4 MMOL/L — LOW (ref 3.5–5.3)
RBC # BLD: 5.47 M/UL — SIGNIFICANT CHANGE UP (ref 4.2–5.8)
RBC # FLD: 14.9 % — HIGH (ref 10.3–14.5)
SODIUM SERPL-SCNC: 139 MMOL/L — SIGNIFICANT CHANGE UP (ref 135–145)
SPECIMEN SOURCE: SIGNIFICANT CHANGE UP
SPECIMEN SOURCE: SIGNIFICANT CHANGE UP
WBC # BLD: 6.28 K/UL — SIGNIFICANT CHANGE UP (ref 3.8–10.5)
WBC # FLD AUTO: 6.28 K/UL — SIGNIFICANT CHANGE UP (ref 3.8–10.5)

## 2019-03-11 PROCEDURE — 99233 SBSQ HOSP IP/OBS HIGH 50: CPT

## 2019-03-11 PROCEDURE — 99232 SBSQ HOSP IP/OBS MODERATE 35: CPT | Mod: GC

## 2019-03-11 RX ORDER — INSULIN LISPRO 100/ML
8 VIAL (ML) SUBCUTANEOUS
Qty: 0 | Refills: 0 | Status: DISCONTINUED | OUTPATIENT
Start: 2019-03-11 | End: 2019-03-12

## 2019-03-11 RX ORDER — INSULIN LISPRO 100/ML
7 VIAL (ML) SUBCUTANEOUS ONCE
Qty: 0 | Refills: 0 | Status: DISCONTINUED | OUTPATIENT
Start: 2019-03-11 | End: 2019-03-11

## 2019-03-11 RX ORDER — POTASSIUM CHLORIDE 20 MEQ
20 PACKET (EA) ORAL ONCE
Qty: 0 | Refills: 0 | Status: COMPLETED | OUTPATIENT
Start: 2019-03-11 | End: 2019-03-11

## 2019-03-11 RX ORDER — INSULIN LISPRO 100/ML
14 VIAL (ML) SUBCUTANEOUS
Qty: 0 | Refills: 0 | Status: DISCONTINUED | OUTPATIENT
Start: 2019-03-11 | End: 2019-03-11

## 2019-03-11 RX ADMIN — Medication 1: at 08:46

## 2019-03-11 RX ADMIN — Medication 20 MILLIEQUIVALENT(S): at 22:41

## 2019-03-11 RX ADMIN — TACROLIMUS 4 MILLIGRAM(S): 5 CAPSULE ORAL at 17:30

## 2019-03-11 RX ADMIN — Medication 200 MILLIGRAM(S): at 06:40

## 2019-03-11 RX ADMIN — Medication 5 MILLIGRAM(S): at 06:41

## 2019-03-11 RX ADMIN — Medication 3: at 12:36

## 2019-03-11 RX ADMIN — Medication 81 MILLIGRAM(S): at 12:37

## 2019-03-11 RX ADMIN — HEPARIN SODIUM 5000 UNIT(S): 5000 INJECTION INTRAVENOUS; SUBCUTANEOUS at 22:40

## 2019-03-11 RX ADMIN — HEPARIN SODIUM 5000 UNIT(S): 5000 INJECTION INTRAVENOUS; SUBCUTANEOUS at 12:37

## 2019-03-11 RX ADMIN — Medication 200 MILLIGRAM(S): at 22:42

## 2019-03-11 RX ADMIN — ATORVASTATIN CALCIUM 20 MILLIGRAM(S): 80 TABLET, FILM COATED ORAL at 22:40

## 2019-03-11 RX ADMIN — Medication 200 MILLIGRAM(S): at 12:37

## 2019-03-11 RX ADMIN — Medication 14 UNIT(S): at 08:45

## 2019-03-11 RX ADMIN — TACROLIMUS 4 MILLIGRAM(S): 5 CAPSULE ORAL at 06:40

## 2019-03-11 RX ADMIN — Medication 1 TABLET(S): at 06:40

## 2019-03-11 RX ADMIN — Medication 60 MILLIGRAM(S): at 06:41

## 2019-03-11 RX ADMIN — HEPARIN SODIUM 5000 UNIT(S): 5000 INJECTION INTRAVENOUS; SUBCUTANEOUS at 06:41

## 2019-03-11 RX ADMIN — MEROPENEM 100 MILLIGRAM(S): 1 INJECTION INTRAVENOUS at 06:40

## 2019-03-11 RX ADMIN — INSULIN GLARGINE 30 UNIT(S): 100 INJECTION, SOLUTION SUBCUTANEOUS at 22:45

## 2019-03-11 RX ADMIN — TAMSULOSIN HYDROCHLORIDE 0.4 MILLIGRAM(S): 0.4 CAPSULE ORAL at 22:41

## 2019-03-11 RX ADMIN — Medication 1 TABLET(S): at 17:28

## 2019-03-11 RX ADMIN — Medication 14 UNIT(S): at 12:36

## 2019-03-11 NOTE — PROGRESS NOTE ADULT - PROBLEM SELECTOR PROBLEM 3
Metabolic acidosis
ACP (advance care planning)
Hypertension
Hypertension
Metabolic acidosis
Metabolic acidosis

## 2019-03-11 NOTE — PROGRESS NOTE ADULT - PROBLEM SELECTOR PLAN 2
- high grade coag neg staph bacteremia and ?UTI and transplant pyelonephritis   - care per ID appreciated, follow their ongoing recs

## 2019-03-11 NOTE — PROGRESS NOTE ADULT - ASSESSMENT
chest pain   likely due to nausea and esophagitis   no biomarker evidence of acute MI   GI eval noted    Fever  work up as per ID     HTN  stable     CKD  s/p transplant  fu with renal

## 2019-03-11 NOTE — PROGRESS NOTE ADULT - ASSESSMENT
Assessment  DMT2: 63y Male with DM T2 with hyperglycemia on basal bolus insulin at home, blood sugars trending down, no hypoglycemic episode, eating meals, non compliant with low carb diet.  Fever: On medications, stable, monitored.  HTN: Controlled, no headaches, on medications.  ESRD: On hemodialysis, Monitor labs/BMP          Millie Altamirano MD  Cell: [phone]  Office: [phone]

## 2019-03-11 NOTE — PROGRESS NOTE ADULT - ASSESSMENT
63 M with DM and ESRD s/p renal transplant 2011 on Cellcept, Tacrolimus and Prednisone, admitted 3/7/19 with acute fevers. Associated vomiting and one episode diarrhea. Daughter-in-law with brief febrile illness one week prior.   Here 101.6F. Nontoxic but leukocytosis to 14 and worsening renal function  negative  UA, CXR, RVP.   abd/pelvis CT: non specific fat stranding surrounding the transplant kidney  blood cx: high grade coag neg staph, urine also coag neg staph    renal transplant immunocompromised pt with sepsis, fever, leukocytosis, BENEDICTO and transplant  fat stranding concerning for pyelonephritis  high grade coag neg staph bacteremia and ?UTI and transplant pyelonephritis     Recommend:  * repeat  the blood cxs x 2 sets  * Check vanco level in the am, redose vancomycin if <15  * Can discontinue meropenem  * Check TTE

## 2019-03-11 NOTE — PROGRESS NOTE ADULT - ASSESSMENT
HTN, HLD, decreased visual acuity bilaterally p/w 2d fever, chills,  weakness, N/V x 3 days  without cough, dysuria, headache or abdominal pain. No other associated symptoms      Problem/Plan - 1:  ·  Problem: severe sepsis secondary to bacteremia and pyonephritis  with metabolic encephalopathy POA now improved   Plan: cw current antibiotics   fu cultures  ID following  will monitor.     Problem/Plan - 2:  ·  Problem: BENEDICTO (acute kidney injury).  Plan: monitor cr  renal fu   sp renal transplant  cw immunosuppressant.     Problem/Plan - 3:  ·  Problem: Type 2 diabetes mellitus.  Plan: unconrolled   monitor FS  Basal/bolus.   endo fu

## 2019-03-11 NOTE — PROGRESS NOTE ADULT - SUBJECTIVE AND OBJECTIVE BOX
[facility] Division of Kidney Diseases & Hypertension  FOLLOW UP NOTE  [phone]--------------------------------------------------------------------------------  Chief Complaint:Fever    24 hour events/subjective:    Patient seen and examined.  Feels better than yesterday.  Denies c/o SOB, CP, abdominal pain, nausea, or vomiting     PAST HISTORY  --------------------------------------------------------------------------------  No significant changes to PMH, PSH, FHx, SHx, unless otherwise noted    ALLERGIES & MEDICATIONS  --------------------------------------------------------------------------------  Allergies    No Known Drug Allergies  Seafood (Pruritus; Rash)    Intolerances      Standing Inpatient Medications  aspirin enteric coated 81 milliGRAM(s) Oral daily  atorvastatin 20 milliGRAM(s) Oral at bedtime  calcium carbonate   1250 mG (OsCal) 1 Tablet(s) Oral two times a day  dextrose 5%. 1000 milliLiter(s) IV Continuous <Continuous>  dextrose 50% Injectable 12.5 Gram(s) IV Push once  dextrose 50% Injectable 25 Gram(s) IV Push once  dextrose 50% Injectable 25 Gram(s) IV Push once  heparin  Injectable 5000 Unit(s) SubCutaneous every 8 hours  insulin glargine Injectable (LANTUS) 30 Unit(s) SubCutaneous at bedtime  insulin lispro (HumaLOG) corrective regimen sliding scale   SubCutaneous three times a day before meals  insulin lispro Injectable (HumaLOG) 14 Unit(s) SubCutaneous three times a day before meals  labetalol 200 milliGRAM(s) Oral three times a day  meropenem  IVPB 1000 milliGRAM(s) IV Intermittent every 12 hours  NIFEdipine XL 60 milliGRAM(s) Oral daily  predniSONE   Tablet 5 milliGRAM(s) Oral daily  tacrolimus 4 milliGRAM(s) Oral every 12 hours  tamsulosin 0.4 milliGRAM(s) Oral at bedtime    PRN Inpatient Medications  acetaminophen   Tablet .. 650 milliGRAM(s) Oral every 6 hours PRN  dextrose 40% Gel 15 Gram(s) Oral once PRN  glucagon  Injectable 1 milliGRAM(s) IntraMuscular once PRN  ondansetron Injectable 4 milliGRAM(s) IV Push every 6 hours PRN      REVIEW OF SYSTEMS  --------------------------------------------------------------------------------  Gen: No  fevers/chills  Skin: No rashes  Head/Eyes/Ears/Mouth: No headache; Normal hearing; Normal vision w/o blurriness  Respiratory: No dyspnea, cough, wheezing, hemoptysis  CV: No chest pain, PND, orthopnea  GI: No abdominal pain, diarrhea, constipation, nausea, vomiting  : No increased frequency, dysuria, hematuria, nocturia  MSK: No joint pain/swelling; no back pain; no edema  Neuro: No dizziness/lightheadedness, weakness, seizures, numbness, tingling      All other systems were reviewed and are negative, except as noted.    VITALS/PHYSICAL EXAM  --------------------------------------------------------------------------------  T(C): 37.1 (03-11-19 @ 07:47), Max: 37.1 (03-10-19 @ 22:02)  HR: 82 (03-11-19 @ 07:47) (78 - 82)  BP: 135/66 (03-11-19 @ 07:47) (135/66 - 154/74)  RR: 18 (03-11-19 @ 07:47) (18 - 18)  SpO2: 96% (03-11-19 @ 07:47) (95% - 96%)  Wt(kg): --        03-10-19 @ 07:01  -  03-11-19 @ 07:00  --------------------------------------------------------  IN: 900 mL / OUT: 600 mL / NET: 300 mL    03-11-19 @ 07:01  -  03-11-19 @ 14:41  --------------------------------------------------------  IN: 500 mL / OUT: 0 mL / NET: 500 mL      Physical Exam:  	Gen: NAD  	HEENT: sclera anicteric   	Pulm: CTA B/L  	CV: RRR, S1S2;  	Abd: +BS, soft, nontender/nondistended              Extremities: no bilateral LE edema noted.               Neuro: No focal deficits  	Skin: Warm and dry              Vascular access: Right UE thrombosed AVF present.     LABS/STUDIES  --------------------------------------------------------------------------------              11.8   6.28  >-----------<  181      [03-11-19 @ 09:40]              40.0     139  |  102  |  33  ----------------------------<  157      [03-11-19 @ 07:44]  3.4   |  21  |  2.45        Ca     9.3     [03-11-19 @ 07:44]            Creatinine Trend:  SCr 2.45 [03-11 @ 07:44]  SCr 2.34 [03-09 @ 07:54]  SCr 2.13 [03-08 @ 11:55]    Urinalysis - [03-08-19 @ 16:01]      Color Light Yellow / Appearance Clear / SG 1.016 / pH 6.5      Gluc 1000 mg/dL / Ketone Small  / Bili Negative / Urobili Negative       Blood Small / Protein 30 mg/dL / Leuk Est Negative / Nitrite Negative      RBC 1 / WBC 3 / Hyaline 0 / Gran  / Sq Epi  / Non Sq Epi 1 / Bacteria Negative      HbA1c 9.2      [03-09-19 @ 10:23]    HCV 0.06, Nonreact      [03-09-19 @ 14:44]

## 2019-03-11 NOTE — PROGRESS NOTE ADULT - PROBLEM SELECTOR PLAN 3
in setting of renal failure, lactic acidosis, and diabetic ketoacidosis. Latest serum CO2 noted to be low but stable at 21. Continue to monitor serum CO2 level.

## 2019-03-11 NOTE — PROGRESS NOTE ADULT - SUBJECTIVE AND OBJECTIVE BOX
Chief complaint  Patient is a 63y old  Male who presents with a chief complaint of fever (11 Mar 2019 12:16)   Review of systems  Patient in bed, looks comfortable, no fever,  no hypoglycemia.    Labs and Fingersticks  CAPILLARY BLOOD GLUCOSE      POCT Blood Glucose.: 77 mg/dL (11 Mar 2019 17:03)  POCT Blood Glucose.: 259 mg/dL (11 Mar 2019 12:28)  POCT Blood Glucose.: 170 mg/dL (11 Mar 2019 08:27)  POCT Blood Glucose.: 132 mg/dL (10 Mar 2019 22:06)      Anion Gap, Serum: 16 (03-11 @ 07:44)      Calcium, Total Serum: 9.3 (03-11 @ 07:44)          03-11    139  |  102  |  33<H>  ----------------------------<  157<H>  3.4<L>   |  21<L>  |  2.45<H>    Ca    9.3      11 Mar 2019 07:44                          11.8   6.28  )-----------( 181      ( 11 Mar 2019 09:40 )             40.0     Medications  MEDICATIONS  (STANDING):  aspirin enteric coated 81 milliGRAM(s) Oral daily  atorvastatin 20 milliGRAM(s) Oral at bedtime  calcium carbonate   1250 mG (OsCal) 1 Tablet(s) Oral two times a day  dextrose 5%. 1000 milliLiter(s) (50 mL/Hr) IV Continuous <Continuous>  dextrose 50% Injectable 12.5 Gram(s) IV Push once  dextrose 50% Injectable 25 Gram(s) IV Push once  dextrose 50% Injectable 25 Gram(s) IV Push once  heparin  Injectable 5000 Unit(s) SubCutaneous every 8 hours  insulin glargine Injectable (LANTUS) 30 Unit(s) SubCutaneous at bedtime  insulin lispro (HumaLOG) corrective regimen sliding scale   SubCutaneous three times a day before meals  insulin lispro Injectable (HumaLOG) 14 Unit(s) SubCutaneous three times a day before meals  labetalol 200 milliGRAM(s) Oral three times a day  meropenem  IVPB 1000 milliGRAM(s) IV Intermittent every 12 hours  NIFEdipine XL 60 milliGRAM(s) Oral daily  predniSONE   Tablet 5 milliGRAM(s) Oral daily  tacrolimus 4 milliGRAM(s) Oral every 12 hours  tamsulosin 0.4 milliGRAM(s) Oral at bedtime      Physical Exam  General: Patient comfortable in bed  Vital Signs Last 12 Hrs  T(F): 98 (03-11-19 @ 15:41), Max: 98.8 (03-11-19 @ 07:47)  HR: 70 (03-11-19 @ 15:41) (70 - 82)  BP: 144/88 (03-11-19 @ 15:41) (135/66 - 144/88)  BP(mean): --  RR: 18 (03-11-19 @ 15:41) (18 - 18)  SpO2: 97% (03-11-19 @ 15:41) (96% - 97%)  Neck: No palpable thyroid nodules.  CVS: S1S2, No murmurs  Respiratory: No wheezing, no crepitations  GI: Abdomen soft, bowel sounds positive  Musculoskeletal:  edema lower extremities.   Skin: No skin rashes, no ecchymosis    Diagnostics

## 2019-03-11 NOTE — PROGRESS NOTE ADULT - SUBJECTIVE AND OBJECTIVE BOX
Subjective: Patient seen and examined. No new events except as noted.     SUBJECTIVE/ROS:  feels ok       MEDICATIONS:  MEDICATIONS  (STANDING):  aspirin enteric coated 81 milliGRAM(s) Oral daily  atorvastatin 20 milliGRAM(s) Oral at bedtime  calcium carbonate   1250 mG (OsCal) 1 Tablet(s) Oral two times a day  dextrose 5%. 1000 milliLiter(s) (50 mL/Hr) IV Continuous <Continuous>  dextrose 50% Injectable 12.5 Gram(s) IV Push once  dextrose 50% Injectable 25 Gram(s) IV Push once  dextrose 50% Injectable 25 Gram(s) IV Push once  heparin  Injectable 5000 Unit(s) SubCutaneous every 8 hours  insulin glargine Injectable (LANTUS) 30 Unit(s) SubCutaneous at bedtime  insulin lispro (HumaLOG) corrective regimen sliding scale   SubCutaneous three times a day before meals  insulin lispro Injectable (HumaLOG) 14 Unit(s) SubCutaneous three times a day before meals  labetalol 200 milliGRAM(s) Oral three times a day  meropenem  IVPB 1000 milliGRAM(s) IV Intermittent every 12 hours  NIFEdipine XL 60 milliGRAM(s) Oral daily  predniSONE   Tablet 5 milliGRAM(s) Oral daily  tacrolimus 4 milliGRAM(s) Oral every 12 hours  tamsulosin 0.4 milliGRAM(s) Oral at bedtime      PHYSICAL EXAM:  T(C): 37.1 (03-10-19 @ 22:02), Max: 37.1 (03-10-19 @ 22:02)  HR: 82 (03-10-19 @ 22:02) (78 - 85)  BP: 144/73 (03-10-19 @ 22:02) (141/74 - 154/74)  RR: 18 (03-10-19 @ 22:02) (18 - 18)  SpO2: 95% (03-10-19 @ 22:02) (95% - 96%)  Wt(kg): --  I&O's Summary    10 Mar 2019 07:01  -  11 Mar 2019 07:00  --------------------------------------------------------  IN: 900 mL / OUT: 600 mL / NET: 300 mL            JVP: Normal  Neck: supple  Lung: clear   CV: S1 S2 , Murmur:  Abd: soft  Ext: No edema  neuro: Awake / alert  Psych: flat affect  Skin: normal``    LABS/DATA:    CARDIAC MARKERS:                                12.3   14.04 )-----------( 149      ( 09 Mar 2019 10:23 )             42.3     03-09    137  |  99  |  29<H>  ----------------------------<  356<H>  4.5   |  20<L>  |  2.34<H>    Ca    9.2      09 Mar 2019 07:54    TPro  6.4  /  Alb  3.8  /  TBili  0.4  /  DBili  x   /  AST  20  /  ALT  16  /  AlkPhos  60  03-09    proBNP:   Lipid Profile:   HgA1c:   TSH:     TELE:  EKG:

## 2019-03-11 NOTE — PROGRESS NOTE ADULT - SUBJECTIVE AND OBJECTIVE BOX
CC: Patient is a 63y old  Male who presents with a chief complaint of fever (11 Mar 2019 18:36)    ID following for fever, bacteremia    Interval History/ROS: Patient feeling better. Blood cultures positive for CoNS. Also, urine culture with CoNS. Now afebrile.    Rest of ROS negative.    Allergies  No Known Drug Allergies  Seafood (Pruritus; Rash)    ANTIMICROBIALS:  meropenem  IVPB 1000 every 12 hours    OTHER MEDS:  acetaminophen   Tablet .. 650 milliGRAM(s) Oral every 6 hours PRN  aspirin enteric coated 81 milliGRAM(s) Oral daily  atorvastatin 20 milliGRAM(s) Oral at bedtime  calcium carbonate   1250 mG (OsCal) 1 Tablet(s) Oral two times a day  dextrose 40% Gel 15 Gram(s) Oral once PRN  dextrose 5%. 1000 milliLiter(s) IV Continuous <Continuous>  dextrose 50% Injectable 12.5 Gram(s) IV Push once  dextrose 50% Injectable 25 Gram(s) IV Push once  dextrose 50% Injectable 25 Gram(s) IV Push once  glucagon  Injectable 1 milliGRAM(s) IntraMuscular once PRN  heparin  Injectable 5000 Unit(s) SubCutaneous every 8 hours  insulin glargine Injectable (LANTUS) 30 Unit(s) SubCutaneous at bedtime  insulin lispro (HumaLOG) corrective regimen sliding scale   SubCutaneous three times a day before meals  insulin lispro Injectable (HumaLOG) 8 Unit(s) SubCutaneous three times a day before meals  labetalol 200 milliGRAM(s) Oral three times a day  NIFEdipine XL 60 milliGRAM(s) Oral daily  ondansetron Injectable 4 milliGRAM(s) IV Push every 6 hours PRN  predniSONE   Tablet 5 milliGRAM(s) Oral daily  tacrolimus 4 milliGRAM(s) Oral every 12 hours  tamsulosin 0.4 milliGRAM(s) Oral at bedtime    PE:    Vital Signs Last 24 Hrs  T(C): 36.7 (11 Mar 2019 15:41), Max: 37.1 (10 Mar 2019 22:02)  T(F): 98 (11 Mar 2019 15:41), Max: 98.8 (11 Mar 2019 07:47)  HR: 70 (11 Mar 2019 15:41) (70 - 82)  BP: 144/88 (11 Mar 2019 15:41) (135/66 - 144/88)  BP(mean): --  RR: 18 (11 Mar 2019 15:41) (18 - 18)  SpO2: 97% (11 Mar 2019 15:41) (95% - 97%)    Gen: AOx3, NAD, non-toxic  CV: S1+S2 normal, no murmurs  Resp: Clear bilat, no resp distress  Abd: Soft, nontender, +BS  Ext: No LE edema, no wounds  : No Coker  IV/Skin: No thrombophlebitis  Neuro: no focal deficits    LABS:                          11.8   6.28  )-----------( 181      ( 11 Mar 2019 09:40 )             40.0       03-11    139  |  102  |  33<H>  ----------------------------<  157<H>  3.4<L>   |  21<L>  |  2.45<H>    Ca    9.3      11 Mar 2019 07:44    MICROBIOLOGY:  v  .Urine Clean Catch (Midstream)  03-08-19   >100,000 CFU/ml Coag Negative Staphylococcus  "Susceptibilities not performed"  --  --      .Blood Blood  03-08-19   Growth in aerobic and anaerobic bottles: Staphylococcus epidermidis  --  Blood Culture PCR    Rapid RVP Result: NotDetec (03-08 @ 11:59)    RADIOLOGY:    < from: CT Abdomen and Pelvis No Cont (03.08.19 @ 20:05) >  IMPRESSION:     Right pelvic transplant kidney with nonspecific surrounding fat   stranding.No hydronephrosis. In the setting of fever, consider   pyelonephritis. Correlation with urinalysis is recommended.    No appendicitis, colitis, or bowel obstruction.    < end of copied text >

## 2019-03-11 NOTE — PROGRESS NOTE ADULT - SUBJECTIVE AND OBJECTIVE BOX
Interval Events: pt seen and examined. He denies abdominal pain, n/v. Tolerating PO well.  + 1 loose bm today.     MEDICATIONS  (STANDING):  aspirin enteric coated 81 milliGRAM(s) Oral daily  atorvastatin 20 milliGRAM(s) Oral at bedtime  calcium carbonate   1250 mG (OsCal) 1 Tablet(s) Oral two times a day  dextrose 5%. 1000 milliLiter(s) (50 mL/Hr) IV Continuous <Continuous>  dextrose 50% Injectable 12.5 Gram(s) IV Push once  dextrose 50% Injectable 25 Gram(s) IV Push once  dextrose 50% Injectable 25 Gram(s) IV Push once  heparin  Injectable 5000 Unit(s) SubCutaneous every 8 hours  insulin glargine Injectable (LANTUS) 30 Unit(s) SubCutaneous at bedtime  insulin lispro (HumaLOG) corrective regimen sliding scale   SubCutaneous three times a day before meals  insulin lispro Injectable (HumaLOG) 14 Unit(s) SubCutaneous three times a day before meals  labetalol 200 milliGRAM(s) Oral three times a day  meropenem  IVPB 1000 milliGRAM(s) IV Intermittent every 12 hours  NIFEdipine XL 60 milliGRAM(s) Oral daily  predniSONE   Tablet 5 milliGRAM(s) Oral daily  tacrolimus 4 milliGRAM(s) Oral every 12 hours  tamsulosin 0.4 milliGRAM(s) Oral at bedtime    MEDICATIONS  (PRN):  acetaminophen   Tablet .. 650 milliGRAM(s) Oral every 6 hours PRN Temp greater or equal to 38C (100.4F)  dextrose 40% Gel 15 Gram(s) Oral once PRN Blood Glucose LESS THAN 70 milliGRAM(s)/deciliter  glucagon  Injectable 1 milliGRAM(s) IntraMuscular once PRN Glucose LESS THAN 70 milligrams/deciliter  ondansetron Injectable 4 milliGRAM(s) IV Push every 6 hours PRN Nausea      Allergies    No Known Drug Allergies  Seafood (Pruritus; Rash)    Intolerances        Review of Systems:    General:  No wt loss, fevers, chills, night sweats,fatigue,   Eyes:  Good vision, no reported pain  ENT:  No sore throat, pain, runny nose, dysphagia  CV:  No pain, palpitations, hypo/hypertension  Resp:  No dyspnea, cough, tachypnea, wheezing  GI:  No pain, No nausea, No vomiting, No diarrhea, No constipation, No weight loss, No fever, No pruritis, No rectal bleeding, No melena, No dysphagia  :  No pain, bleeding, incontinence, nocturia  Muscle:  No pain, weakness  Neuro:  No weakness, tingling, memory problems  Psych:  No fatigue, insomnia, mood problems, depression  Endocrine:  No polyuria, polydypsia, cold/heat intolerance  Heme:  No petechiae, ecchymosis, easy bruisability  Skin:  No rash, tattoos, scars, edema      Vital Signs Last 24 Hrs  T(C): 37.1 (11 Mar 2019 07:47), Max: 37.1 (10 Mar 2019 22:02)  T(F): 98.8 (11 Mar 2019 07:47), Max: 98.8 (11 Mar 2019 07:47)  HR: 82 (11 Mar 2019 07:47) (78 - 82)  BP: 135/66 (11 Mar 2019 07:47) (135/66 - 154/74)  BP(mean): --  RR: 18 (11 Mar 2019 07:47) (18 - 18)  SpO2: 96% (11 Mar 2019 07:47) (95% - 96%)    PHYSICAL EXAM:    Constitutional: NAD, well-developed  HEENT: EOMI, throat clear  Neck: No LAD, supple  Respiratory: CTA and P  Cardiovascular: S1 and S2, RRR, no M  Gastrointestinal: BS+, soft, NT/ND, neg HSM,  Extremities: No peripheral edema, neg clubing, cyanosis  Vascular: 2+ peripheral pulses  Neurological: A/O x 3, no focal deficits  Psychiatric: Normal mood, normal affect  Skin: No rashes      LABS:                        11.8   6.28  )-----------( 181      ( 11 Mar 2019 09:40 )             40.0     03-11    139  |  102  |  33<H>  ----------------------------<  157<H>  3.4<L>   |  21<L>  |  2.45<H>    Ca    9.3      11 Mar 2019 07:44            RADIOLOGY & ADDITIONAL TESTS:

## 2019-03-11 NOTE — PROGRESS NOTE ADULT - PROBLEM SELECTOR PROBLEM 1
Pancreatitis, chronic
BENEDICTO (acute kidney injury)
BENEDICTO (acute kidney injury)
Type 2 diabetes mellitus
Type 2 diabetes mellitus
BENEDICTO (acute kidney injury)

## 2019-03-11 NOTE — PROGRESS NOTE ADULT - ASSESSMENT
63 year old male with h/o DDRT in 2011, CKD (baseline sCr 1.6-1.8) found to have BENEDICTO on CKD in setting of vomiting, infection and ARB use.

## 2019-03-11 NOTE — PROGRESS NOTE ADULT - PROBLEM SELECTOR PROBLEM 2
Sepsis
Fever of unknown origin (FUO)
Fever of unknown origin (FUO)
Immunosuppressed status

## 2019-03-11 NOTE — PROGRESS NOTE ADULT - PROBLEM SELECTOR PLAN 1
Patient with BENEDICTO on CKD in setting of pyelonephritis and bacteremia. Baseline Scr ~1.6-1.8. Scr. was noted to be elevated at 2.13 on admission which increased to 2.45 today. CT abdomen and pelvis done on 3/8 shows no hydronephrosis. Remains non oliguric. Continue with IV antibiotics. Continue to hold ARB. Monitor BMP daily. Avoid NSAIDs, RCAS, and other nephrotoxins.

## 2019-03-11 NOTE — PROGRESS NOTE ADULT - PROBLEM SELECTOR PLAN 2
DDRT in 2011, on tacrolimus 4/4,  mg BID, and prednisone 5 mg daily.   - HOLD MMF in setting of infection and GI complaints.   - C/w tacro 4/4. Check tacrolimus trough (30 minutes prior to AM dose) DAILY.  - C/w prednisone

## 2019-03-11 NOTE — PROGRESS NOTE ADULT - SUBJECTIVE AND OBJECTIVE BOX
Patient is a 63y old  Male who presents with a chief complaint of fever (11 Mar 2019 17:56)      INTERVAL HPI/OVERNIGHT EVENTS:  T(C): 36.7 (03-11-19 @ 15:41), Max: 37.1 (03-10-19 @ 22:02)  HR: 70 (03-11-19 @ 15:41) (70 - 82)  BP: 144/88 (03-11-19 @ 15:41) (135/66 - 144/88)  RR: 18 (03-11-19 @ 15:41) (18 - 18)  SpO2: 97% (03-11-19 @ 15:41) (95% - 97%)  Wt(kg): --  I&O's Summary    10 Mar 2019 07:01  -  11 Mar 2019 07:00  --------------------------------------------------------  IN: 900 mL / OUT: 600 mL / NET: 300 mL    11 Mar 2019 07:01  -  11 Mar 2019 18:36  --------------------------------------------------------  IN: 500 mL / OUT: 0 mL / NET: 500 mL        LABS:                        11.8   6.28  )-----------( 181      ( 11 Mar 2019 09:40 )             40.0     03-11    139  |  102  |  33<H>  ----------------------------<  157<H>  3.4<L>   |  21<L>  |  2.45<H>    Ca    9.3      11 Mar 2019 07:44          CAPILLARY BLOOD GLUCOSE      POCT Blood Glucose.: 77 mg/dL (11 Mar 2019 17:03)  POCT Blood Glucose.: 259 mg/dL (11 Mar 2019 12:28)  POCT Blood Glucose.: 170 mg/dL (11 Mar 2019 08:27)  POCT Blood Glucose.: 132 mg/dL (10 Mar 2019 22:06)            MEDICATIONS  (STANDING):  aspirin enteric coated 81 milliGRAM(s) Oral daily  atorvastatin 20 milliGRAM(s) Oral at bedtime  calcium carbonate   1250 mG (OsCal) 1 Tablet(s) Oral two times a day  dextrose 5%. 1000 milliLiter(s) (50 mL/Hr) IV Continuous <Continuous>  dextrose 50% Injectable 12.5 Gram(s) IV Push once  dextrose 50% Injectable 25 Gram(s) IV Push once  dextrose 50% Injectable 25 Gram(s) IV Push once  heparin  Injectable 5000 Unit(s) SubCutaneous every 8 hours  insulin glargine Injectable (LANTUS) 30 Unit(s) SubCutaneous at bedtime  insulin lispro (HumaLOG) corrective regimen sliding scale   SubCutaneous three times a day before meals  insulin lispro Injectable (HumaLOG) 8 Unit(s) SubCutaneous three times a day before meals  labetalol 200 milliGRAM(s) Oral three times a day  meropenem  IVPB 1000 milliGRAM(s) IV Intermittent every 12 hours  NIFEdipine XL 60 milliGRAM(s) Oral daily  predniSONE   Tablet 5 milliGRAM(s) Oral daily  tacrolimus 4 milliGRAM(s) Oral every 12 hours  tamsulosin 0.4 milliGRAM(s) Oral at bedtime    MEDICATIONS  (PRN):  acetaminophen   Tablet .. 650 milliGRAM(s) Oral every 6 hours PRN Temp greater or equal to 38C (100.4F)  dextrose 40% Gel 15 Gram(s) Oral once PRN Blood Glucose LESS THAN 70 milliGRAM(s)/deciliter  glucagon  Injectable 1 milliGRAM(s) IntraMuscular once PRN Glucose LESS THAN 70 milligrams/deciliter  ondansetron Injectable 4 milliGRAM(s) IV Push every 6 hours PRN Nausea          PHYSICAL EXAM:  GENERAL: NAD, well-groomed, well-developed  HEAD:  Atraumatic, Normocephalic  CHEST/LUNG: Clear to percussion bilaterally; No rales, rhonchi, wheezing, or rubs  HEART: Regular rate and rhythm; No murmurs, rubs, or gallops  ABDOMEN: Soft, Nontender, Nondistended; Bowel sounds present  EXTREMITIES:  2+ Peripheral Pulses, No clubbing, cyanosis, or edema  LYMPH: No lymphadenopathy noted  SKIN: No rashes or lesions    Care Discussed with Consultants/Other Providers [ ] YES  [ ] NO

## 2019-03-12 LAB
ANION GAP SERPL CALC-SCNC: 15 MMOL/L — SIGNIFICANT CHANGE UP (ref 5–17)
BUN SERPL-MCNC: 30 MG/DL — HIGH (ref 7–23)
CALCIUM SERPL-MCNC: 9.3 MG/DL — SIGNIFICANT CHANGE UP (ref 8.4–10.5)
CHLORIDE SERPL-SCNC: 105 MMOL/L — SIGNIFICANT CHANGE UP (ref 96–108)
CO2 SERPL-SCNC: 22 MMOL/L — SIGNIFICANT CHANGE UP (ref 22–31)
CREAT SERPL-MCNC: 2.25 MG/DL — HIGH (ref 0.5–1.3)
GLUCOSE BLDC GLUCOMTR-MCNC: 112 MG/DL — HIGH (ref 70–99)
GLUCOSE BLDC GLUCOMTR-MCNC: 137 MG/DL — HIGH (ref 70–99)
GLUCOSE BLDC GLUCOMTR-MCNC: 163 MG/DL — HIGH (ref 70–99)
GLUCOSE BLDC GLUCOMTR-MCNC: 239 MG/DL — HIGH (ref 70–99)
GLUCOSE SERPL-MCNC: 103 MG/DL — HIGH (ref 70–99)
POTASSIUM SERPL-MCNC: 3.9 MMOL/L — SIGNIFICANT CHANGE UP (ref 3.5–5.3)
POTASSIUM SERPL-SCNC: 3.9 MMOL/L — SIGNIFICANT CHANGE UP (ref 3.5–5.3)
SODIUM SERPL-SCNC: 142 MMOL/L — SIGNIFICANT CHANGE UP (ref 135–145)
TACROLIMUS SERPL-MCNC: 7.3 NG/ML — SIGNIFICANT CHANGE UP
VANCOMYCIN TROUGH SERPL-MCNC: 4.1 UG/ML — LOW (ref 10–20)

## 2019-03-12 PROCEDURE — 99232 SBSQ HOSP IP/OBS MODERATE 35: CPT

## 2019-03-12 RX ORDER — VANCOMYCIN HCL 1 G
1000 VIAL (EA) INTRAVENOUS ONCE
Qty: 0 | Refills: 0 | Status: COMPLETED | OUTPATIENT
Start: 2019-03-12 | End: 2019-03-12

## 2019-03-12 RX ORDER — INSULIN LISPRO 100/ML
6 VIAL (ML) SUBCUTANEOUS
Qty: 0 | Refills: 0 | Status: DISCONTINUED | OUTPATIENT
Start: 2019-03-12 | End: 2019-03-16

## 2019-03-12 RX ADMIN — HEPARIN SODIUM 5000 UNIT(S): 5000 INJECTION INTRAVENOUS; SUBCUTANEOUS at 06:07

## 2019-03-12 RX ADMIN — TAMSULOSIN HYDROCHLORIDE 0.4 MILLIGRAM(S): 0.4 CAPSULE ORAL at 21:19

## 2019-03-12 RX ADMIN — Medication 200 MILLIGRAM(S): at 16:13

## 2019-03-12 RX ADMIN — Medication 5 MILLIGRAM(S): at 06:06

## 2019-03-12 RX ADMIN — Medication 200 MILLIGRAM(S): at 21:19

## 2019-03-12 RX ADMIN — Medication 81 MILLIGRAM(S): at 12:57

## 2019-03-12 RX ADMIN — Medication 1 TABLET(S): at 17:39

## 2019-03-12 RX ADMIN — ATORVASTATIN CALCIUM 20 MILLIGRAM(S): 80 TABLET, FILM COATED ORAL at 21:19

## 2019-03-12 RX ADMIN — Medication 1 TABLET(S): at 06:06

## 2019-03-12 RX ADMIN — TACROLIMUS 4 MILLIGRAM(S): 5 CAPSULE ORAL at 17:39

## 2019-03-12 RX ADMIN — Medication 8 UNIT(S): at 12:55

## 2019-03-12 RX ADMIN — Medication 1: at 17:38

## 2019-03-12 RX ADMIN — Medication 6 UNIT(S): at 17:38

## 2019-03-12 RX ADMIN — HEPARIN SODIUM 5000 UNIT(S): 5000 INJECTION INTRAVENOUS; SUBCUTANEOUS at 21:19

## 2019-03-12 RX ADMIN — TACROLIMUS 4 MILLIGRAM(S): 5 CAPSULE ORAL at 06:06

## 2019-03-12 RX ADMIN — Medication 250 MILLIGRAM(S): at 17:38

## 2019-03-12 RX ADMIN — Medication 2: at 12:53

## 2019-03-12 RX ADMIN — Medication 60 MILLIGRAM(S): at 06:06

## 2019-03-12 RX ADMIN — HEPARIN SODIUM 5000 UNIT(S): 5000 INJECTION INTRAVENOUS; SUBCUTANEOUS at 16:13

## 2019-03-12 RX ADMIN — Medication 200 MILLIGRAM(S): at 06:06

## 2019-03-12 RX ADMIN — INSULIN GLARGINE 30 UNIT(S): 100 INJECTION, SOLUTION SUBCUTANEOUS at 21:18

## 2019-03-12 NOTE — PROGRESS NOTE ADULT - ASSESSMENT
Problem/Plan - 1:  ·  Problem: severe sepsis secondary to bacteremia and pyonephritis  with metabolic encephalopathy POA now improved   Plan: cw current antibiotics   fu cultures  ID following  will monitor.     Problem/Plan - 2:  ·  Problem: BENEDICTO (acute kidney injury).  Plan: monitor cr  renal fu   sp renal transplant  cw immunosuppressant.     Problem/Plan - 3:  ·  Problem: Type 2 diabetes mellitus.  Plan: unconrolled   monitor FS  Basal/bolus.   endo fu

## 2019-03-12 NOTE — PROGRESS NOTE ADULT - SUBJECTIVE AND OBJECTIVE BOX
[facility] Division of Kidney Diseases & Hypertension  FOLLOW UP NOTE  [phone]--------------------------------------------------------------------------------  Chief Complaint:BENEDICTO on CKD/history of renal transplant.     24 hour events/subjective:    Patient seen and examined.  Denies c/o SOB, CP, abdominal pain, nausea, or vomiting   Non oliguric.     PAST HISTORY  --------------------------------------------------------------------------------  No significant changes to PMH, PSH, FHx, SHx, unless otherwise noted    ALLERGIES & MEDICATIONS  --------------------------------------------------------------------------------  Allergies    No Known Drug Allergies  Seafood (Pruritus; Rash)    Intolerances      Standing Inpatient Medications  aspirin enteric coated 81 milliGRAM(s) Oral daily  atorvastatin 20 milliGRAM(s) Oral at bedtime  calcium carbonate   1250 mG (OsCal) 1 Tablet(s) Oral two times a day  dextrose 5%. 1000 milliLiter(s) IV Continuous <Continuous>  dextrose 50% Injectable 12.5 Gram(s) IV Push once  dextrose 50% Injectable 25 Gram(s) IV Push once  dextrose 50% Injectable 25 Gram(s) IV Push once  heparin  Injectable 5000 Unit(s) SubCutaneous every 8 hours  insulin glargine Injectable (LANTUS) 30 Unit(s) SubCutaneous at bedtime  insulin lispro (HumaLOG) corrective regimen sliding scale   SubCutaneous three times a day before meals  insulin lispro Injectable (HumaLOG) 8 Unit(s) SubCutaneous three times a day before meals  labetalol 200 milliGRAM(s) Oral three times a day  NIFEdipine XL 60 milliGRAM(s) Oral daily  predniSONE   Tablet 5 milliGRAM(s) Oral daily  tacrolimus 4 milliGRAM(s) Oral every 12 hours  tamsulosin 0.4 milliGRAM(s) Oral at bedtime  vancomycin  IVPB 1000 milliGRAM(s) IV Intermittent once    PRN Inpatient Medications  acetaminophen   Tablet .. 650 milliGRAM(s) Oral every 6 hours PRN  dextrose 40% Gel 15 Gram(s) Oral once PRN  glucagon  Injectable 1 milliGRAM(s) IntraMuscular once PRN  ondansetron Injectable 4 milliGRAM(s) IV Push every 6 hours PRN      REVIEW OF SYSTEMS  --------------------------------------------------------------------------------  Gen: No  fevers/chills  Skin: No rashes  Head/Eyes/Ears/Mouth: No headache; Normal hearing; Normal vision w/o blurriness  Respiratory: No dyspnea, cough, wheezing, hemoptysis  CV: No chest pain, PND, orthopnea  GI: No abdominal pain, diarrhea, constipation, nausea, vomiting  : No increased frequency, dysuria, hematuria, nocturia  MSK: No joint pain/swelling; no back pain; no edema  Neuro: No dizziness/lightheadedness, weakness, seizures, numbness, tingling      All other systems were reviewed and are negative, except as noted.    VITALS/PHYSICAL EXAM  --------------------------------------------------------------------------------  T(C): 36.7 (03-12-19 @ 10:32), Max: 36.7 (03-11-19 @ 15:41)  HR: 80 (03-12-19 @ 10:32) (70 - 86)  BP: 119/74 (03-12-19 @ 10:32) (119/74 - 148/78)  RR: 18 (03-12-19 @ 10:32) (18 - 18)  SpO2: 100% (03-12-19 @ 10:32) (97% - 100%)  Wt(kg): --        03-11-19 @ 07:01  -  03-12-19 @ 07:00  --------------------------------------------------------  IN: 1150 mL / OUT: 0 mL / NET: 1150 mL    03-12-19 @ 07:01  -  03-12-19 @ 14:16  --------------------------------------------------------  IN: 800 mL / OUT: 375 mL / NET: 425 mL      Physical Exam:  	  Gen: NAD  	HEENT: sclera anicteric   	Pulm: CTA B/L  	CV: RRR, S1S2;  	Abd: +BS, soft, nontender/nondistended              Extremities: no bilateral LE edema noted.               Neuro: No focal deficits  	Skin: Warm and dry              Vascular access: Right UE thrombosed AVF present.     LABS/STUDIES  --------------------------------------------------------------------------------              11.8   6.28  >-----------<  181      [03-11-19 @ 09:40]              40.0     142  |  105  |  30  ----------------------------<  103      [03-12-19 @ 07:34]  3.9   |  22  |  2.25        Ca     9.3     [03-12-19 @ 07:34]            Creatinine Trend:  SCr 2.25 [03-12 @ 07:34]  SCr 2.45 [03-11 @ 07:44]  SCr 2.34 [03-09 @ 07:54]  SCr 2.13 [03-08 @ 11:55]    Urinalysis - [03-08-19 @ 16:01]      Color Light Yellow / Appearance Clear / SG 1.016 / pH 6.5      Gluc 1000 mg/dL / Ketone Small  / Bili Negative / Urobili Negative       Blood Small / Protein 30 mg/dL / Leuk Est Negative / Nitrite Negative      RBC 1 / WBC 3 / Hyaline 0 / Gran  / Sq Epi  / Non Sq Epi 1 / Bacteria Negative      HbA1c 9.2      [03-09-19 @ 10:23]    HCV 0.06, Nonreact      [03-09-19 @ 14:44]

## 2019-03-12 NOTE — PROGRESS NOTE ADULT - SUBJECTIVE AND OBJECTIVE BOX
Interval Events: pt seen and examined. He denies abdominal pain, n/v. Tolerating PO well  + formed bm this morning.     MEDICATIONS  (STANDING):  aspirin enteric coated 81 milliGRAM(s) Oral daily  atorvastatin 20 milliGRAM(s) Oral at bedtime  calcium carbonate   1250 mG (OsCal) 1 Tablet(s) Oral two times a day  dextrose 5%. 1000 milliLiter(s) (50 mL/Hr) IV Continuous <Continuous>  dextrose 50% Injectable 12.5 Gram(s) IV Push once  dextrose 50% Injectable 25 Gram(s) IV Push once  dextrose 50% Injectable 25 Gram(s) IV Push once  heparin  Injectable 5000 Unit(s) SubCutaneous every 8 hours  insulin glargine Injectable (LANTUS) 30 Unit(s) SubCutaneous at bedtime  insulin lispro (HumaLOG) corrective regimen sliding scale   SubCutaneous three times a day before meals  insulin lispro Injectable (HumaLOG) 8 Unit(s) SubCutaneous three times a day before meals  labetalol 200 milliGRAM(s) Oral three times a day  NIFEdipine XL 60 milliGRAM(s) Oral daily  predniSONE   Tablet 5 milliGRAM(s) Oral daily  tacrolimus 4 milliGRAM(s) Oral every 12 hours  tamsulosin 0.4 milliGRAM(s) Oral at bedtime  vancomycin  IVPB 1000 milliGRAM(s) IV Intermittent once    MEDICATIONS  (PRN):  acetaminophen   Tablet .. 650 milliGRAM(s) Oral every 6 hours PRN Temp greater or equal to 38C (100.4F)  dextrose 40% Gel 15 Gram(s) Oral once PRN Blood Glucose LESS THAN 70 milliGRAM(s)/deciliter  glucagon  Injectable 1 milliGRAM(s) IntraMuscular once PRN Glucose LESS THAN 70 milligrams/deciliter  ondansetron Injectable 4 milliGRAM(s) IV Push every 6 hours PRN Nausea      Allergies    No Known Drug Allergies  Seafood (Pruritus; Rash)    Intolerances        Review of Systems:    General:  No wt loss, fevers, chills, night sweats,fatigue,   Eyes:  Good vision, no reported pain  ENT:  No sore throat, pain, runny nose, dysphagia  CV:  No pain, palpitations, hypo/hypertension  Resp:  No dyspnea, cough, tachypnea, wheezing  GI:  No pain, No nausea, No vomiting, No diarrhea, No constipation, No weight loss, No fever, No pruritis, No rectal bleeding, No melena, No dysphagia  :  No pain, bleeding, incontinence, nocturia  Muscle:  No pain, weakness  Neuro:  No weakness, tingling, memory problems  Psych:  No fatigue, insomnia, mood problems, depression  Endocrine:  No polyuria, polydypsia, cold/heat intolerance  Heme:  No petechiae, ecchymosis, easy bruisability  Skin:  No rash, tattoos, scars, edema      Vital Signs Last 24 Hrs  T(C): 36.7 (11 Mar 2019 21:56), Max: 36.7 (11 Mar 2019 15:41)  T(F): 98.1 (11 Mar 2019 21:56), Max: 98.1 (11 Mar 2019 21:56)  HR: 86 (12 Mar 2019 06:05) (70 - 86)  BP: 130/82 (12 Mar 2019 06:05) (130/82 - 148/78)  BP(mean): --  RR: 18 (11 Mar 2019 21:56) (18 - 18)  SpO2: 97% (11 Mar 2019 21:56) (97% - 97%)    PHYSICAL EXAM:    Constitutional: NAD, well-developed  HEENT: EOMI, throat clear  Neck: No LAD, supple  Respiratory: CTA and P  Cardiovascular: S1 and S2, RRR, no M  Gastrointestinal: BS+, soft, NT/ND, neg HSM,  Extremities: No peripheral edema, neg clubing, cyanosis  Vascular: 2+ peripheral pulses  Neurological: A/O x 3, no focal deficits  Psychiatric: Normal mood, normal affect  Skin: No rashes      LABS:                        11.8   6.28  )-----------( 181      ( 11 Mar 2019 09:40 )             40.0     03-12    142  |  105  |  30<H>  ----------------------------<  103<H>  3.9   |  22  |  2.25<H>    Ca    9.3      12 Mar 2019 07:34            RADIOLOGY & ADDITIONAL TESTS:

## 2019-03-12 NOTE — PROGRESS NOTE ADULT - SUBJECTIVE AND OBJECTIVE BOX
Patient is a 63y old  Male who presents with a chief complaint of fever (12 Mar 2019 10:55)      INTERVAL HPI/OVERNIGHT EVENTS:  T(C): 36.6 (03-12-19 @ 16:08), Max: 36.7 (03-11-19 @ 21:56)  HR: 78 (03-12-19 @ 16:08) (77 - 86)  BP: 158/82 (03-12-19 @ 16:08) (119/74 - 158/82)  RR: 18 (03-12-19 @ 16:08) (18 - 18)  SpO2: 98% (03-12-19 @ 16:08) (97% - 100%)  Wt(kg): --  I&O's Summary    11 Mar 2019 07:01  -  12 Mar 2019 07:00  --------------------------------------------------------  IN: 1150 mL / OUT: 0 mL / NET: 1150 mL    12 Mar 2019 07:01  -  12 Mar 2019 19:00  --------------------------------------------------------  IN: 1040 mL / OUT: 375 mL / NET: 665 mL        LABS:                        11.8   6.28  )-----------( 181      ( 11 Mar 2019 09:40 )             40.0     03-12    142  |  105  |  30<H>  ----------------------------<  103<H>  3.9   |  22  |  2.25<H>    Ca    9.3      12 Mar 2019 07:34          CAPILLARY BLOOD GLUCOSE      POCT Blood Glucose.: 163 mg/dL (12 Mar 2019 17:01)  POCT Blood Glucose.: 239 mg/dL (12 Mar 2019 12:43)  POCT Blood Glucose.: 137 mg/dL (12 Mar 2019 08:41)  POCT Blood Glucose.: 152 mg/dL (11 Mar 2019 22:44)  POCT Blood Glucose.: 142 mg/dL (11 Mar 2019 21:21)            MEDICATIONS  (STANDING):  aspirin enteric coated 81 milliGRAM(s) Oral daily  atorvastatin 20 milliGRAM(s) Oral at bedtime  calcium carbonate   1250 mG (OsCal) 1 Tablet(s) Oral two times a day  dextrose 5%. 1000 milliLiter(s) (50 mL/Hr) IV Continuous <Continuous>  dextrose 50% Injectable 12.5 Gram(s) IV Push once  dextrose 50% Injectable 25 Gram(s) IV Push once  dextrose 50% Injectable 25 Gram(s) IV Push once  heparin  Injectable 5000 Unit(s) SubCutaneous every 8 hours  insulin glargine Injectable (LANTUS) 30 Unit(s) SubCutaneous at bedtime  insulin lispro (HumaLOG) corrective regimen sliding scale   SubCutaneous three times a day before meals  insulin lispro Injectable (HumaLOG) 6 Unit(s) SubCutaneous three times a day before meals  labetalol 200 milliGRAM(s) Oral three times a day  NIFEdipine XL 60 milliGRAM(s) Oral daily  predniSONE   Tablet 5 milliGRAM(s) Oral daily  tacrolimus 4 milliGRAM(s) Oral every 12 hours  tamsulosin 0.4 milliGRAM(s) Oral at bedtime    MEDICATIONS  (PRN):  acetaminophen   Tablet .. 650 milliGRAM(s) Oral every 6 hours PRN Temp greater or equal to 38C (100.4F)  dextrose 40% Gel 15 Gram(s) Oral once PRN Blood Glucose LESS THAN 70 milliGRAM(s)/deciliter  glucagon  Injectable 1 milliGRAM(s) IntraMuscular once PRN Glucose LESS THAN 70 milligrams/deciliter  ondansetron Injectable 4 milliGRAM(s) IV Push every 6 hours PRN Nausea          PHYSICAL EXAM:  GENERAL: NAD, well-groomed, well-developed  HEAD:  Atraumatic, Normocephalic  CHEST/LUNG: Clear to percussion bilaterally; No rales, rhonchi, wheezing, or rubs  HEART: Regular rate and rhythm; No murmurs, rubs, or gallops  ABDOMEN: Soft, Nontender, Nondistended; Bowel sounds present  EXTREMITIES:  2+ Peripheral Pulses, No clubbing, cyanosis, or edema  LYMPH: No lymphadenopathy noted  SKIN: No rashes or lesions    Care Discussed with Consultants/Other Providers [ *] YES  [ ] NO

## 2019-03-12 NOTE — PROGRESS NOTE ADULT - SUBJECTIVE AND OBJECTIVE BOX
Subjective: Patient seen and examined. No new events except as noted.     SUBJECTIVE/ROS:  No chest pain, dyspnea, palpitation, or dizziness.       MEDICATIONS:  MEDICATIONS  (STANDING):  aspirin enteric coated 81 milliGRAM(s) Oral daily  atorvastatin 20 milliGRAM(s) Oral at bedtime  calcium carbonate   1250 mG (OsCal) 1 Tablet(s) Oral two times a day  dextrose 5%. 1000 milliLiter(s) (50 mL/Hr) IV Continuous <Continuous>  dextrose 50% Injectable 12.5 Gram(s) IV Push once  dextrose 50% Injectable 25 Gram(s) IV Push once  dextrose 50% Injectable 25 Gram(s) IV Push once  heparin  Injectable 5000 Unit(s) SubCutaneous every 8 hours  insulin glargine Injectable (LANTUS) 30 Unit(s) SubCutaneous at bedtime  insulin lispro (HumaLOG) corrective regimen sliding scale   SubCutaneous three times a day before meals  insulin lispro Injectable (HumaLOG) 8 Unit(s) SubCutaneous three times a day before meals  labetalol 200 milliGRAM(s) Oral three times a day  NIFEdipine XL 60 milliGRAM(s) Oral daily  predniSONE   Tablet 5 milliGRAM(s) Oral daily  tacrolimus 4 milliGRAM(s) Oral every 12 hours  tamsulosin 0.4 milliGRAM(s) Oral at bedtime      PHYSICAL EXAM:  T(C): 36.7 (03-11-19 @ 21:56), Max: 37.1 (03-11-19 @ 07:47)  HR: 86 (03-12-19 @ 06:05) (70 - 86)  BP: 130/82 (03-12-19 @ 06:05) (130/82 - 148/78)  RR: 18 (03-11-19 @ 21:56) (18 - 18)  SpO2: 97% (03-11-19 @ 21:56) (96% - 97%)  Wt(kg): --  I&O's Summary    10 Mar 2019 07:01  -  11 Mar 2019 07:00  --------------------------------------------------------  IN: 900 mL / OUT: 600 mL / NET: 300 mL    11 Mar 2019 07:01  -  12 Mar 2019 06:41  --------------------------------------------------------  IN: 900 mL / OUT: 0 mL / NET: 900 mL            JVP: Normal  Neck: supple  Lung: clear   CV: S1 S2 , Murmur:  Abd: soft  Ext: No edema  neuro: Awake / alert  Psych: flat affect  Skin: normal``    LABS/DATA:    CARDIAC MARKERS:                                11.8   6.28  )-----------( 181      ( 11 Mar 2019 09:40 )             40.0     03-11    139  |  102  |  33<H>  ----------------------------<  157<H>  3.4<L>   |  21<L>  |  2.45<H>    Ca    9.3      11 Mar 2019 07:44      proBNP:   Lipid Profile:   HgA1c:   TSH:     TELE:  EKG:

## 2019-03-12 NOTE — PROGRESS NOTE ADULT - NSICDXPROBLEM_GEN_ALL_CORE_FT
PROBLEM DIAGNOSES  Problem: ACP (advance care planning)  Assessment and Plan: Advanced care planning was discussed with patient and family.  Advanced care planning forms were reviewed and discussed.  Risks, benefits and alternatives of gastroenterologic procedures were discussed in detail and all questions were answered.    30 minutes spent.      Problem: Sepsis  Assessment and Plan: high grade coag neg staph bacteremia and ?UTI and transplant pyelonephritis   - care per ID appreciated, follow their ongoing recs    Problem: Pancreatitis, chronic  Assessment and Plan: pt has hx of ETOH pancreatitis  - asymptomatic at this point  - no further work up at this time

## 2019-03-12 NOTE — PROGRESS NOTE ADULT - SUBJECTIVE AND OBJECTIVE BOX
CC: Patient is a 63y old  Male who presents with a chief complaint of fever (12 Mar 2019 09:42)    ID following for bacteremia    Interval History/ROS: Patient feels better. No fevers, no chills.    Rest of ROS negative.    Allergies  No Known Drug Allergies  Seafood (Pruritus; Rash)    ANTIMICROBIALS:  vancomycin  IVPB 1000 once    OTHER MEDS:  acetaminophen   Tablet .. 650 milliGRAM(s) Oral every 6 hours PRN  aspirin enteric coated 81 milliGRAM(s) Oral daily  atorvastatin 20 milliGRAM(s) Oral at bedtime  calcium carbonate   1250 mG (OsCal) 1 Tablet(s) Oral two times a day  dextrose 40% Gel 15 Gram(s) Oral once PRN  dextrose 5%. 1000 milliLiter(s) IV Continuous <Continuous>  dextrose 50% Injectable 12.5 Gram(s) IV Push once  dextrose 50% Injectable 25 Gram(s) IV Push once  dextrose 50% Injectable 25 Gram(s) IV Push once  glucagon  Injectable 1 milliGRAM(s) IntraMuscular once PRN  heparin  Injectable 5000 Unit(s) SubCutaneous every 8 hours  insulin glargine Injectable (LANTUS) 30 Unit(s) SubCutaneous at bedtime  insulin lispro (HumaLOG) corrective regimen sliding scale   SubCutaneous three times a day before meals  insulin lispro Injectable (HumaLOG) 8 Unit(s) SubCutaneous three times a day before meals  labetalol 200 milliGRAM(s) Oral three times a day  NIFEdipine XL 60 milliGRAM(s) Oral daily  ondansetron Injectable 4 milliGRAM(s) IV Push every 6 hours PRN  predniSONE   Tablet 5 milliGRAM(s) Oral daily  tacrolimus 4 milliGRAM(s) Oral every 12 hours  tamsulosin 0.4 milliGRAM(s) Oral at bedtime    PE:    Vital Signs Last 24 Hrs  T(C): 36.7 (12 Mar 2019 10:32), Max: 36.7 (11 Mar 2019 15:41)  T(F): 98.1 (12 Mar 2019 10:32), Max: 98.1 (11 Mar 2019 21:56)  HR: 80 (12 Mar 2019 10:32) (70 - 86)  BP: 119/74 (12 Mar 2019 10:32) (119/74 - 148/78)  BP(mean): --  RR: 18 (12 Mar 2019 10:32) (18 - 18)  SpO2: 100% (12 Mar 2019 10:32) (97% - 100%)    Gen: AOx3, NAD, non-toxic  CV: S1+S2 normal, no murmurs  Resp: Clear bilat, no resp distress  Abd: Soft, nontender, +BS  Ext: No LE edema, no wounds  : No Coker  IV/Skin: No thrombophlebitis  Neuro: no focal deficits      LABS:                          11.8   6.28  )-----------( 181      ( 11 Mar 2019 09:40 )             40.0       03-12    142  |  105  |  30<H>  ----------------------------<  103<H>  3.9   |  22  |  2.25<H>    Ca    9.3      12 Mar 2019 07:34    MICROBIOLOGY:  Vancomycin Level, Trough: 4.1 ug/mL (03-12-19 @ 07:34)  v  .Urine Clean Catch (Midstream)  03-08-19   >100,000 CFU/ml Coag Negative Staphylococcus  "Susceptibilities not performed"  --  --    .Blood Blood  03-08-19   Growth in aerobic and anaerobic bottles: Staphylococcus epidermidis  --  Blood Culture PCR  Coag Negative Staphylococcus    Rapid RVP Result: NotDetec (03-08 @ 11:59)    RADIOLOGY:    < from: CT Abdomen and Pelvis No Cont (03.08.19 @ 20:05) >  IMPRESSION:     Right pelvic transplant kidney with nonspecific surrounding fat   stranding.No hydronephrosis. In the setting of fever, consider   pyelonephritis. Correlation with urinalysis is recommended.    No appendicitis, colitis, or bowel obstruction.    < end of copied text >

## 2019-03-12 NOTE — PROGRESS NOTE ADULT - ASSESSMENT
chest pain   likely due to nausea and esophagitis   no biomarker evidence of acute MI   GI eval noted    Fever  work up as per ID   no fever recently     HTN  stable     CKD  s/p transplant  fu with renal

## 2019-03-12 NOTE — PROGRESS NOTE ADULT - ASSESSMENT
63 M with DM and ESRD s/p renal transplant 2011 on Cellcept, Tacrolimus and Prednisone, admitted 3/7/19 with acute fevers. Associated vomiting and one episode diarrhea. Daughter-in-law with brief febrile illness one week prior.   Here 101.6F. Nontoxic but leukocytosis to 14 and worsening renal function  negative  UA, CXR, RVP.   abd/pelvis CT: non specific fat stranding surrounding the transplant kidney  blood cx: high grade coag neg staph, urine also coag neg staph    renal transplant immunocompromised pt with sepsis, fever, leukocytosis, BENEDICTO and transplant  fat stranding concerning for pyelonephritis  high grade coag neg staph bacteremia and ?UTI and transplant pyelonephritis     Recommend:  * F/U Repeat blood cultures  * Redose vancomycin today  * Check TTE

## 2019-03-12 NOTE — PROGRESS NOTE ADULT - ASSESSMENT
63 year old male with h/o DDRT in 2011, CKD (baseline sCr 1.6-1.8) found to have BENEDICTO on CKD in setting of vomiting, infection and ARB use.    1. BENEDICTO on CKD: Patient with BENEDICTO on CKD in setting of pyelonephritis and bacteremia. Baseline Scr ~1.6-1.8. Scr. was noted to be elevated at 2.13 on admission and had increased to 2.45 on 3/11/19. Latest Scr. has improved to 2.25 today. CT abdomen and pelvis done on 3/8 shows no hydronephrosis. Remains non oliguric. Continue with IV antibiotics. Monitor vancomycin levels. Continue to hold ARB. Monitor BMP daily. Avoid NSAIDs, RCAS, and other nephrotoxins.     2. Immunosuppression: Patient with h/o DDRT in 2011; was on tacrolimus 4/4,  mg BID, and prednisone 5 mg daily. Latest FK noted to be 7.3. Continue to hold MMF. Continue with rest of immunosuppressive medications. Monitor daily FK levels.

## 2019-03-12 NOTE — PROGRESS NOTE ADULT - SUBJECTIVE AND OBJECTIVE BOX
Chief complaint  Patient is a 63y old  Male who presents with a chief complaint of fever (12 Mar 2019 18:59)   Review of systems  Patient in bed, looks comfortable, no fever, no hypoglycemia.    Labs and Fingersticks  CAPILLARY BLOOD GLUCOSE      POCT Blood Glucose.: 112 mg/dL (12 Mar 2019 21:08)  POCT Blood Glucose.: 163 mg/dL (12 Mar 2019 17:01)  POCT Blood Glucose.: 239 mg/dL (12 Mar 2019 12:43)  POCT Blood Glucose.: 137 mg/dL (12 Mar 2019 08:41)  POCT Blood Glucose.: 152 mg/dL (11 Mar 2019 22:44)      Anion Gap, Serum: 15 (03-12 @ 07:34)  Anion Gap, Serum: 16 (03-11 @ 07:44)      Calcium, Total Serum: 9.3 (03-12 @ 07:34)  Calcium, Total Serum: 9.3 (03-11 @ 07:44)          03-12    142  |  105  |  30<H>  ----------------------------<  103<H>  3.9   |  22  |  2.25<H>    Ca    9.3      12 Mar 2019 07:34                          11.8   6.28  )-----------( 181      ( 11 Mar 2019 09:40 )             40.0     Medications  MEDICATIONS  (STANDING):  aspirin enteric coated 81 milliGRAM(s) Oral daily  atorvastatin 20 milliGRAM(s) Oral at bedtime  calcium carbonate   1250 mG (OsCal) 1 Tablet(s) Oral two times a day  dextrose 5%. 1000 milliLiter(s) (50 mL/Hr) IV Continuous <Continuous>  dextrose 50% Injectable 12.5 Gram(s) IV Push once  dextrose 50% Injectable 25 Gram(s) IV Push once  dextrose 50% Injectable 25 Gram(s) IV Push once  heparin  Injectable 5000 Unit(s) SubCutaneous every 8 hours  insulin glargine Injectable (LANTUS) 30 Unit(s) SubCutaneous at bedtime  insulin lispro (HumaLOG) corrective regimen sliding scale   SubCutaneous three times a day before meals  insulin lispro Injectable (HumaLOG) 6 Unit(s) SubCutaneous three times a day before meals  labetalol 200 milliGRAM(s) Oral three times a day  NIFEdipine XL 60 milliGRAM(s) Oral daily  predniSONE   Tablet 5 milliGRAM(s) Oral daily  tacrolimus 4 milliGRAM(s) Oral every 12 hours  tamsulosin 0.4 milliGRAM(s) Oral at bedtime      Physical Exam  General: Patient comfortable in bed  Vital Signs Last 12 Hrs  T(F): 97.7 (03-12-19 @ 21:16), Max: 98.1 (03-12-19 @ 10:32)  HR: 81 (03-12-19 @ 21:16) (78 - 81)  BP: 137/71 (03-12-19 @ 21:16) (119/74 - 158/82)  BP(mean): --  RR: 18 (03-12-19 @ 21:16) (18 - 18)  SpO2: 97% (03-12-19 @ 21:16) (97% - 100%)  Neck: No palpable thyroid nodules.  CVS: S1S2, No murmurs  Respiratory: No wheezing, no crepitations  GI: Abdomen soft, bowel sounds positive  Musculoskeletal:  edema lower extremities.   Skin: No skin rashes, no ecchymosis    Diagnostics

## 2019-03-12 NOTE — PROGRESS NOTE ADULT - ASSESSMENT
Assessment  DMT2: 63y Male with DM T2 with hyperglycemia on basal bolus insulin at home, blood sugars trending down, insulin dose adjusted,  no hypoglycemic episode, eating meals, non compliant with low carb diet.  Fever: On medications, stable, monitored.  HTN: Controlled, no headaches, on medications.  ESRD: On hemodialysis, Monitor labs/BMP       Problem/Plan - 1:  ·  Problem: Type 2 diabetes mellitus.  Plan: Will continue current insulin regimen for now. Will continue monitoring FS, log, will Follow up.  Patient counseled for compliance with consistent low carb diet.      Problem/Plan - 2:  ·  Problem: Fever of unknown origin (FUO).  Plan: Continue treatment, primary team FU.      Problem/Plan - 3:  ·  Problem: Hypertension.  Plan: Continue medications, monitoring, primary team FU.      Problem/Plan - 4:  ·  Problem: ESRD (end stage renal disease).  Plan: On HD, continue as scheduled, renal team FU.

## 2019-03-13 LAB
ANION GAP SERPL CALC-SCNC: 14 MMOL/L — SIGNIFICANT CHANGE UP (ref 5–17)
BUN SERPL-MCNC: 28 MG/DL — HIGH (ref 7–23)
CALCIUM SERPL-MCNC: 9.2 MG/DL — SIGNIFICANT CHANGE UP (ref 8.4–10.5)
CHLORIDE SERPL-SCNC: 104 MMOL/L — SIGNIFICANT CHANGE UP (ref 96–108)
CO2 SERPL-SCNC: 21 MMOL/L — LOW (ref 22–31)
CREAT SERPL-MCNC: 2.03 MG/DL — HIGH (ref 0.5–1.3)
GLUCOSE BLDC GLUCOMTR-MCNC: 106 MG/DL — HIGH (ref 70–99)
GLUCOSE BLDC GLUCOMTR-MCNC: 182 MG/DL — HIGH (ref 70–99)
GLUCOSE BLDC GLUCOMTR-MCNC: 214 MG/DL — HIGH (ref 70–99)
GLUCOSE BLDC GLUCOMTR-MCNC: 73 MG/DL — SIGNIFICANT CHANGE UP (ref 70–99)
GLUCOSE BLDC GLUCOMTR-MCNC: 76 MG/DL — SIGNIFICANT CHANGE UP (ref 70–99)
GLUCOSE SERPL-MCNC: 73 MG/DL — SIGNIFICANT CHANGE UP (ref 70–99)
HCT VFR BLD CALC: 37.8 % — LOW (ref 39–50)
HGB BLD-MCNC: 11.1 G/DL — LOW (ref 13–17)
MCHC RBC-ENTMCNC: 21.7 PG — LOW (ref 27–34)
MCHC RBC-ENTMCNC: 29.4 GM/DL — LOW (ref 32–36)
MCV RBC AUTO: 73.8 FL — LOW (ref 80–100)
PLATELET # BLD AUTO: 214 K/UL — SIGNIFICANT CHANGE UP (ref 150–400)
POTASSIUM SERPL-MCNC: 3.7 MMOL/L — SIGNIFICANT CHANGE UP (ref 3.5–5.3)
POTASSIUM SERPL-SCNC: 3.7 MMOL/L — SIGNIFICANT CHANGE UP (ref 3.5–5.3)
RBC # BLD: 5.12 M/UL — SIGNIFICANT CHANGE UP (ref 4.2–5.8)
RBC # FLD: 14.9 % — HIGH (ref 10.3–14.5)
SODIUM SERPL-SCNC: 139 MMOL/L — SIGNIFICANT CHANGE UP (ref 135–145)
TACROLIMUS SERPL-MCNC: 7.4 NG/ML — SIGNIFICANT CHANGE UP
VANCOMYCIN FLD-MCNC: 9.2 UG/ML — SIGNIFICANT CHANGE UP
WBC # BLD: 4.69 K/UL — SIGNIFICANT CHANGE UP (ref 3.8–10.5)
WBC # FLD AUTO: 4.69 K/UL — SIGNIFICANT CHANGE UP (ref 3.8–10.5)

## 2019-03-13 PROCEDURE — 99232 SBSQ HOSP IP/OBS MODERATE 35: CPT

## 2019-03-13 RX ORDER — INSULIN GLARGINE 100 [IU]/ML
24 INJECTION, SOLUTION SUBCUTANEOUS ONCE
Qty: 0 | Refills: 0 | Status: COMPLETED | OUTPATIENT
Start: 2019-03-13 | End: 2019-03-14

## 2019-03-13 RX ORDER — VANCOMYCIN HCL 1 G
1250 VIAL (EA) INTRAVENOUS ONCE
Qty: 0 | Refills: 0 | Status: COMPLETED | OUTPATIENT
Start: 2019-03-13 | End: 2019-03-13

## 2019-03-13 RX ADMIN — HEPARIN SODIUM 5000 UNIT(S): 5000 INJECTION INTRAVENOUS; SUBCUTANEOUS at 22:42

## 2019-03-13 RX ADMIN — TAMSULOSIN HYDROCHLORIDE 0.4 MILLIGRAM(S): 0.4 CAPSULE ORAL at 22:42

## 2019-03-13 RX ADMIN — Medication 60 MILLIGRAM(S): at 05:41

## 2019-03-13 RX ADMIN — HEPARIN SODIUM 5000 UNIT(S): 5000 INJECTION INTRAVENOUS; SUBCUTANEOUS at 16:25

## 2019-03-13 RX ADMIN — Medication 6 UNIT(S): at 12:24

## 2019-03-13 RX ADMIN — Medication 6 UNIT(S): at 08:29

## 2019-03-13 RX ADMIN — TACROLIMUS 4 MILLIGRAM(S): 5 CAPSULE ORAL at 17:12

## 2019-03-13 RX ADMIN — TACROLIMUS 4 MILLIGRAM(S): 5 CAPSULE ORAL at 05:41

## 2019-03-13 RX ADMIN — Medication 81 MILLIGRAM(S): at 11:24

## 2019-03-13 RX ADMIN — Medication 6 UNIT(S): at 17:20

## 2019-03-13 RX ADMIN — Medication 200 MILLIGRAM(S): at 05:41

## 2019-03-13 RX ADMIN — Medication 1: at 12:24

## 2019-03-13 RX ADMIN — Medication 5 MILLIGRAM(S): at 05:41

## 2019-03-13 RX ADMIN — HEPARIN SODIUM 5000 UNIT(S): 5000 INJECTION INTRAVENOUS; SUBCUTANEOUS at 05:41

## 2019-03-13 RX ADMIN — Medication 200 MILLIGRAM(S): at 22:43

## 2019-03-13 RX ADMIN — ATORVASTATIN CALCIUM 20 MILLIGRAM(S): 80 TABLET, FILM COATED ORAL at 22:42

## 2019-03-13 RX ADMIN — Medication 1 TABLET(S): at 05:41

## 2019-03-13 RX ADMIN — Medication 2: at 17:21

## 2019-03-13 RX ADMIN — Medication 200 MILLIGRAM(S): at 16:26

## 2019-03-13 RX ADMIN — Medication 1 TABLET(S): at 17:12

## 2019-03-13 RX ADMIN — Medication 166.67 MILLIGRAM(S): at 12:23

## 2019-03-13 NOTE — PROGRESS NOTE ADULT - ASSESSMENT
chest pain   likely due to nausea and esophagitis   no biomarker evidence of acute MI   GI eval noted  echo     Fever  work up as per ID   no fever recently   on anbx  echo     HTN  stable     CKD  s/p transplant  fu with renal

## 2019-03-13 NOTE — PROGRESS NOTE ADULT - SUBJECTIVE AND OBJECTIVE BOX
Chief complaint  Patient is a 63y old  Male who presents with a chief complaint of fever (13 Mar 2019 17:42)   Review of systems  Patient in bed, looks comfortable, no fever, no hypoglycemia.    Labs and Fingersticks  CAPILLARY BLOOD GLUCOSE      POCT Blood Glucose.: 214 mg/dL (13 Mar 2019 17:19)  POCT Blood Glucose.: 182 mg/dL (13 Mar 2019 12:09)  POCT Blood Glucose.: 76 mg/dL (13 Mar 2019 08:17)  POCT Blood Glucose.: 112 mg/dL (12 Mar 2019 21:08)      Anion Gap, Serum: 14 (03-13 @ 07:02)  Anion Gap, Serum: 15 (03-12 @ 07:34)      Calcium, Total Serum: 9.2 (03-13 @ 07:02)  Calcium, Total Serum: 9.3 (03-12 @ 07:34)          03-13    139  |  104  |  28<H>  ----------------------------<  73  3.7   |  21<L>  |  2.03<H>    Ca    9.2      13 Mar 2019 07:02                          11.1   4.69  )-----------( 214      ( 13 Mar 2019 08:39 )             37.8     Medications  MEDICATIONS  (STANDING):  aspirin enteric coated 81 milliGRAM(s) Oral daily  atorvastatin 20 milliGRAM(s) Oral at bedtime  calcium carbonate   1250 mG (OsCal) 1 Tablet(s) Oral two times a day  dextrose 5%. 1000 milliLiter(s) (50 mL/Hr) IV Continuous <Continuous>  dextrose 50% Injectable 12.5 Gram(s) IV Push once  dextrose 50% Injectable 25 Gram(s) IV Push once  dextrose 50% Injectable 25 Gram(s) IV Push once  heparin  Injectable 5000 Unit(s) SubCutaneous every 8 hours  insulin glargine Injectable (LANTUS) 30 Unit(s) SubCutaneous at bedtime  insulin lispro (HumaLOG) corrective regimen sliding scale   SubCutaneous three times a day before meals  insulin lispro Injectable (HumaLOG) 6 Unit(s) SubCutaneous three times a day before meals  labetalol 200 milliGRAM(s) Oral three times a day  NIFEdipine XL 60 milliGRAM(s) Oral daily  predniSONE   Tablet 5 milliGRAM(s) Oral daily  tacrolimus 4 milliGRAM(s) Oral every 12 hours  tamsulosin 0.4 milliGRAM(s) Oral at bedtime      Physical Exam  General: Patient comfortable in bed  Vital Signs Last 12 Hrs  T(F): 98 (03-13-19 @ 16:53), Max: 98 (03-13-19 @ 16:53)  HR: 78 (03-13-19 @ 16:53) (78 - 78)  BP: 165/88 (03-13-19 @ 16:53) (165/88 - 165/88)  BP(mean): --  RR: 18 (03-13-19 @ 16:53) (18 - 18)  SpO2: 98% (03-13-19 @ 16:53) (98% - 98%)  Neck: No palpable thyroid nodules.  CVS: S1S2, No murmurs  Respiratory: No wheezing, no crepitations  GI: Abdomen soft, bowel sounds positive  Musculoskeletal:  edema lower extremities.   Skin: No skin rashes, no ecchymosis    Diagnostics

## 2019-03-13 NOTE — PROGRESS NOTE ADULT - ASSESSMENT
Problem: Sepsis  Assessment and Plan: high grade coag neg staph bacteremia and ?UTI and transplant pyelonephritis   - care per ID appreciated, follow their ongoing recs  - f/u echo    Problem: Pancreatitis, chronic  Assessment and Plan: pt has hx of ETOH pancreatitis  - asymptomatic at this point  - no further work up at this time.

## 2019-03-13 NOTE — PROGRESS NOTE ADULT - SUBJECTIVE AND OBJECTIVE BOX
Interval Events: pt seen and examined. He denies abdominal pain, n/v  1 episode of loose still this morning.   Tolerating PO well    MEDICATIONS  (STANDING):  aspirin enteric coated 81 milliGRAM(s) Oral daily  atorvastatin 20 milliGRAM(s) Oral at bedtime  calcium carbonate   1250 mG (OsCal) 1 Tablet(s) Oral two times a day  dextrose 5%. 1000 milliLiter(s) (50 mL/Hr) IV Continuous <Continuous>  dextrose 50% Injectable 12.5 Gram(s) IV Push once  dextrose 50% Injectable 25 Gram(s) IV Push once  dextrose 50% Injectable 25 Gram(s) IV Push once  heparin  Injectable 5000 Unit(s) SubCutaneous every 8 hours  insulin glargine Injectable (LANTUS) 30 Unit(s) SubCutaneous at bedtime  insulin lispro (HumaLOG) corrective regimen sliding scale   SubCutaneous three times a day before meals  insulin lispro Injectable (HumaLOG) 6 Unit(s) SubCutaneous three times a day before meals  labetalol 200 milliGRAM(s) Oral three times a day  NIFEdipine XL 60 milliGRAM(s) Oral daily  predniSONE   Tablet 5 milliGRAM(s) Oral daily  tacrolimus 4 milliGRAM(s) Oral every 12 hours  tamsulosin 0.4 milliGRAM(s) Oral at bedtime  vancomycin  IVPB 1250 milliGRAM(s) IV Intermittent once    MEDICATIONS  (PRN):  acetaminophen   Tablet .. 650 milliGRAM(s) Oral every 6 hours PRN Temp greater or equal to 38C (100.4F)  dextrose 40% Gel 15 Gram(s) Oral once PRN Blood Glucose LESS THAN 70 milliGRAM(s)/deciliter  glucagon  Injectable 1 milliGRAM(s) IntraMuscular once PRN Glucose LESS THAN 70 milligrams/deciliter  ondansetron Injectable 4 milliGRAM(s) IV Push every 6 hours PRN Nausea      Allergies    No Known Drug Allergies  Seafood (Pruritus; Rash)    Intolerances        Review of Systems:    General:  No wt loss, fevers, chills, night sweats,fatigue,   Eyes:  Good vision, no reported pain  ENT:  No sore throat, pain, runny nose, dysphagia  CV:  No pain, palpitations, hypo/hypertension  Resp:  No dyspnea, cough, tachypnea, wheezing  GI:  No pain, No nausea, No vomiting, No diarrhea, No constipation, No weight loss, No fever, No pruritis, No rectal bleeding, No melena, No dysphagia  :  No pain, bleeding, incontinence, nocturia  Muscle:  No pain, weakness  Neuro:  No weakness, tingling, memory problems  Psych:  No fatigue, insomnia, mood problems, depression  Endocrine:  No polyuria, polydypsia, cold/heat intolerance  Heme:  No petechiae, ecchymosis, easy bruisability  Skin:  No rash, tattoos, scars, edema      Vital Signs Last 24 Hrs  T(C): 36.7 (13 Mar 2019 07:37), Max: 36.7 (12 Mar 2019 10:32)  T(F): 98.1 (13 Mar 2019 07:37), Max: 98.1 (12 Mar 2019 10:32)  HR: 73 (13 Mar 2019 07:37) (73 - 81)  BP: 158/90 (13 Mar 2019 07:37) (119/74 - 158/90)  BP(mean): --  RR: 18 (13 Mar 2019 07:37) (18 - 18)  SpO2: 98% (13 Mar 2019 07:37) (97% - 100%)    PHYSICAL EXAM:    Constitutional: NAD, well-developed  HEENT: EOMI, throat clear  Neck: No LAD, supple  Respiratory: CTA and P  Cardiovascular: S1 and S2, RRR, no M  Gastrointestinal: BS+, soft, NT/ND, neg HSM,  Extremities: No peripheral edema, neg clubing, cyanosis  Vascular: 2+ peripheral pulses  Neurological: A/O x 3, no focal deficits  Psychiatric: Normal mood, normal affect  Skin: No rashes      LABS:                        11.1   4.69  )-----------( 214      ( 13 Mar 2019 08:39 )             37.8     03-13    139  |  104  |  28<H>  ----------------------------<  73  3.7   |  21<L>  |  2.03<H>    Ca    9.2      13 Mar 2019 07:02            RADIOLOGY & ADDITIONAL TESTS:

## 2019-03-13 NOTE — PROGRESS NOTE ADULT - ASSESSMENT
Assessment  DMT2: 63y Male with DM T2 with hyperglycemia on basal bolus insulin at home, blood sugars  fluctuating, insulin dose adjusted,  no hypoglycemic episode, eating meals, non compliant with low carb diet.  Fever: On medications, stable, monitored.  HTN: Controlled, no headaches, on medications.  ESRD: On hemodialysis, Monitor labs/BMP       Problem/Plan - 1:  ·  Problem: Type 2 diabetes mellitus.  Plan: Will continue current insulin regimen for now. Will continue monitoring FS, log, will Follow up.  Patient counseled for compliance with consistent low carb diet.      Problem/Plan - 2:  ·  Problem: Fever of unknown origin (FUO).  Plan: Continue treatment, primary team FU.      Problem/Plan - 3:  ·  Problem: Hypertension.  Plan: Continue medications, monitoring, primary team FU.      Problem/Plan - 4:  ·  Problem: ESRD (end stage renal disease).  Plan: On HD, continue as scheduled, renal team FU.

## 2019-03-13 NOTE — PROGRESS NOTE ADULT - SUBJECTIVE AND OBJECTIVE BOX
CC: Patient is a 63y old  Male who presents with a chief complaint of fever (13 Mar 2019 09:44)    ID following for bacteremia    Interval History/ROS: Patient sitting in chair, feels well. Has no new complaints. Denies fever, chills.    Rest of ROS negative.    Allergies  No Known Drug Allergies  Seafood (Pruritus; Rash)    ANTIMICROBIALS:  None    OTHER MEDS:  acetaminophen   Tablet .. 650 milliGRAM(s) Oral every 6 hours PRN  aspirin enteric coated 81 milliGRAM(s) Oral daily  atorvastatin 20 milliGRAM(s) Oral at bedtime  calcium carbonate   1250 mG (OsCal) 1 Tablet(s) Oral two times a day  dextrose 40% Gel 15 Gram(s) Oral once PRN  dextrose 5%. 1000 milliLiter(s) IV Continuous <Continuous>  dextrose 50% Injectable 12.5 Gram(s) IV Push once  dextrose 50% Injectable 25 Gram(s) IV Push once  dextrose 50% Injectable 25 Gram(s) IV Push once  glucagon  Injectable 1 milliGRAM(s) IntraMuscular once PRN  heparin  Injectable 5000 Unit(s) SubCutaneous every 8 hours  insulin glargine Injectable (LANTUS) 30 Unit(s) SubCutaneous at bedtime  insulin lispro (HumaLOG) corrective regimen sliding scale   SubCutaneous three times a day before meals  insulin lispro Injectable (HumaLOG) 6 Unit(s) SubCutaneous three times a day before meals  labetalol 200 milliGRAM(s) Oral three times a day  NIFEdipine XL 60 milliGRAM(s) Oral daily  ondansetron Injectable 4 milliGRAM(s) IV Push every 6 hours PRN  predniSONE   Tablet 5 milliGRAM(s) Oral daily  tacrolimus 4 milliGRAM(s) Oral every 12 hours  tamsulosin 0.4 milliGRAM(s) Oral at bedtime    PE:    Vital Signs Last 24 Hrs  T(C): 36.7 (13 Mar 2019 07:37), Max: 36.7 (13 Mar 2019 07:37)  T(F): 98.1 (13 Mar 2019 07:37), Max: 98.1 (13 Mar 2019 07:37)  HR: 73 (13 Mar 2019 07:37) (73 - 81)  BP: 158/90 (13 Mar 2019 07:37) (137/71 - 158/90)  BP(mean): --  RR: 18 (13 Mar 2019 07:37) (18 - 18)  SpO2: 98% (13 Mar 2019 07:37) (97% - 98%)    Gen: AOx3, NAD, non-toxic  CV: S1+S2 normal, no murmurs  Resp: Clear bilat, no resp distress  Abd: Soft, nontender, +BS  Ext: No LE edema, no wounds  : No Coker  IV/Skin: No thrombophlebitis  Neuro: no focal deficits      LABS:                          11.1   4.69  )-----------( 214      ( 13 Mar 2019 08:39 )             37.8       03-13    139  |  104  |  28<H>  ----------------------------<  73  3.7   |  21<L>  |  2.03<H>    Ca    9.2      13 Mar 2019 07:02    MICROBIOLOGY:  Vancomycin Level, Random: 9.2 ug/mL (03-13-19 @ 07:02)  v  .Blood Blood-Peripheral  03-12-19   No growth to date.  --  --      .Urine Clean Catch (Midstream)  03-08-19   >100,000 CFU/ml Coag Negative Staphylococcus  "Susceptibilities not performed"  --  --      .Blood Blood  03-08-19   Growth in aerobic and anaerobic bottles: Staphylococcus epidermidis  --  Blood Culture PCR  Coag Negative Staphylococcus    Rapid RVP Result: NotDetec (03-08 @ 11:59)    RADIOLOGY:  < from: CT Abdomen and Pelvis No Cont (03.08.19 @ 20:05) >  IMPRESSION:     Right pelvic transplant kidney with nonspecific surrounding fat   stranding.No hydronephrosis. In the setting of fever, consider   pyelonephritis. Correlation with urinalysis is recommended.    No appendicitis, colitis, or bowel obstruction.    < end of copied text >

## 2019-03-13 NOTE — PROGRESS NOTE ADULT - ASSESSMENT
63 M with DM and ESRD s/p renal transplant 2011 on Cellcept, Tacrolimus and Prednisone, admitted 3/7/19 with acute fevers. Associated vomiting and one episode diarrhea. Daughter-in-law with brief febrile illness one week prior.   Here 101.6F. Nontoxic but leukocytosis to 14 and worsening renal function  negative  UA, CXR, RVP.   abd/pelvis CT: non specific fat stranding surrounding the transplant kidney  blood cx: high grade coag neg staph, urine also coag neg staph    renal transplant immunocompromised pt with sepsis, fever, leukocytosis, BENEDICTO and transplant  fat stranding concerning for pyelonephritis  high grade coag neg staph bacteremia and ?UTI and transplant pyelonephritis     Recommend:  * F/U Repeat blood cultures  * Redose vancomycin today  * F/U TTE

## 2019-03-13 NOTE — PROGRESS NOTE ADULT - SUBJECTIVE AND OBJECTIVE BOX
Patient is a 63y old  Male who presents with a chief complaint of fever (13 Mar 2019 15:04)      INTERVAL HPI/OVERNIGHT EVENTS:  T(C): 36.7 (03-13-19 @ 16:53), Max: 36.7 (03-13-19 @ 07:37)  HR: 78 (03-13-19 @ 16:53) (73 - 81)  BP: 165/88 (03-13-19 @ 16:53) (137/71 - 165/88)  RR: 18 (03-13-19 @ 16:53) (18 - 18)  SpO2: 98% (03-13-19 @ 16:53) (97% - 98%)  Wt(kg): --  I&O's Summary    12 Mar 2019 07:01  -  13 Mar 2019 07:00  --------------------------------------------------------  IN: 1040 mL / OUT: 375 mL / NET: 665 mL    13 Mar 2019 07:01  -  13 Mar 2019 17:42  --------------------------------------------------------  IN: 520 mL / OUT: 400 mL / NET: 120 mL        LABS:                        11.1   4.69  )-----------( 214      ( 13 Mar 2019 08:39 )             37.8     03-13    139  |  104  |  28<H>  ----------------------------<  73  3.7   |  21<L>  |  2.03<H>    Ca    9.2      13 Mar 2019 07:02          CAPILLARY BLOOD GLUCOSE      POCT Blood Glucose.: 214 mg/dL (13 Mar 2019 17:19)  POCT Blood Glucose.: 182 mg/dL (13 Mar 2019 12:09)  POCT Blood Glucose.: 76 mg/dL (13 Mar 2019 08:17)  POCT Blood Glucose.: 112 mg/dL (12 Mar 2019 21:08)            MEDICATIONS  (STANDING):  aspirin enteric coated 81 milliGRAM(s) Oral daily  atorvastatin 20 milliGRAM(s) Oral at bedtime  calcium carbonate   1250 mG (OsCal) 1 Tablet(s) Oral two times a day  dextrose 5%. 1000 milliLiter(s) (50 mL/Hr) IV Continuous <Continuous>  dextrose 50% Injectable 12.5 Gram(s) IV Push once  dextrose 50% Injectable 25 Gram(s) IV Push once  dextrose 50% Injectable 25 Gram(s) IV Push once  heparin  Injectable 5000 Unit(s) SubCutaneous every 8 hours  insulin glargine Injectable (LANTUS) 30 Unit(s) SubCutaneous at bedtime  insulin lispro (HumaLOG) corrective regimen sliding scale   SubCutaneous three times a day before meals  insulin lispro Injectable (HumaLOG) 6 Unit(s) SubCutaneous three times a day before meals  labetalol 200 milliGRAM(s) Oral three times a day  NIFEdipine XL 60 milliGRAM(s) Oral daily  predniSONE   Tablet 5 milliGRAM(s) Oral daily  tacrolimus 4 milliGRAM(s) Oral every 12 hours  tamsulosin 0.4 milliGRAM(s) Oral at bedtime    MEDICATIONS  (PRN):  acetaminophen   Tablet .. 650 milliGRAM(s) Oral every 6 hours PRN Temp greater or equal to 38C (100.4F)  dextrose 40% Gel 15 Gram(s) Oral once PRN Blood Glucose LESS THAN 70 milliGRAM(s)/deciliter  glucagon  Injectable 1 milliGRAM(s) IntraMuscular once PRN Glucose LESS THAN 70 milligrams/deciliter  ondansetron Injectable 4 milliGRAM(s) IV Push every 6 hours PRN Nausea          PHYSICAL EXAM:  GENERAL: NAD, well-groomed, well-developed  HEAD:  Atraumatic, Normocephalic  CHEST/LUNG: Clear to percussion bilaterally; No rales, rhonchi, wheezing, or rubs  HEART: Regular rate and rhythm; No murmurs, rubs, or gallops  ABDOMEN: Soft, Nontender, Nondistended; Bowel sounds present  EXTREMITIES:  2+ Peripheral Pulses, No clubbing, cyanosis, or edema  LYMPH: No lymphadenopathy noted  SKIN: No rashes or lesions    Care Discussed with Consultants/Other Providers [ ] YES  [ ] NO

## 2019-03-13 NOTE — PROGRESS NOTE ADULT - SUBJECTIVE AND OBJECTIVE BOX
Subjective: Patient seen and examined. No new events except as noted.     SUBJECTIVE/ROS:        MEDICATIONS:  MEDICATIONS  (STANDING):  aspirin enteric coated 81 milliGRAM(s) Oral daily  atorvastatin 20 milliGRAM(s) Oral at bedtime  calcium carbonate   1250 mG (OsCal) 1 Tablet(s) Oral two times a day  dextrose 5%. 1000 milliLiter(s) (50 mL/Hr) IV Continuous <Continuous>  dextrose 50% Injectable 12.5 Gram(s) IV Push once  dextrose 50% Injectable 25 Gram(s) IV Push once  dextrose 50% Injectable 25 Gram(s) IV Push once  heparin  Injectable 5000 Unit(s) SubCutaneous every 8 hours  insulin glargine Injectable (LANTUS) 30 Unit(s) SubCutaneous at bedtime  insulin lispro (HumaLOG) corrective regimen sliding scale   SubCutaneous three times a day before meals  insulin lispro Injectable (HumaLOG) 6 Unit(s) SubCutaneous three times a day before meals  labetalol 200 milliGRAM(s) Oral three times a day  NIFEdipine XL 60 milliGRAM(s) Oral daily  predniSONE   Tablet 5 milliGRAM(s) Oral daily  tacrolimus 4 milliGRAM(s) Oral every 12 hours  tamsulosin 0.4 milliGRAM(s) Oral at bedtime      PHYSICAL EXAM:  T(C): 36.7 (03-13-19 @ 07:37), Max: 36.7 (03-13-19 @ 07:37)  HR: 73 (03-13-19 @ 07:37) (73 - 81)  BP: 158/90 (03-13-19 @ 07:37) (137/71 - 158/90)  RR: 18 (03-13-19 @ 07:37) (18 - 18)  SpO2: 98% (03-13-19 @ 07:37) (97% - 98%)  Wt(kg): --  I&O's Summary    12 Mar 2019 07:01  -  13 Mar 2019 07:00  --------------------------------------------------------  IN: 1040 mL / OUT: 375 mL / NET: 665 mL    13 Mar 2019 07:01  -  13 Mar 2019 15:04  --------------------------------------------------------  IN: 520 mL / OUT: 400 mL / NET: 120 mL            JVP: Normal  Neck: supple  Lung: clear   CV: S1 S2 , Murmur:  Abd: soft  Ext: No edema  neuro: Awake / alert  Psych: flat affect  Skin: normal``    LABS/DATA:    CARDIAC MARKERS:                                11.1   4.69  )-----------( 214      ( 13 Mar 2019 08:39 )             37.8     03-13    139  |  104  |  28<H>  ----------------------------<  73  3.7   |  21<L>  |  2.03<H>    Ca    9.2      13 Mar 2019 07:02      proBNP:   Lipid Profile:   HgA1c:   TSH:     TELE:  EKG:

## 2019-03-13 NOTE — PROGRESS NOTE ADULT - ASSESSMENT
Problem/Plan - 1:  ·  Problem: severe sepsis secondary to bacteremia and pyonephritis  with metabolic encephalopathy POA now improved   Plan: cw current antibiotics   fu cultures  ID following  will monitor.     Problem/Plan - 2:  ·  Problem: BENEDICTO (acute kidney injury).  Plan: monitor cr  renal fu   sp renal transplant  cw immunosuppressant.     Problem/Plan - 3:  ·  Problem: Type 2 diabetes mellitus.  Plan: uncontrolled   monitor FS  Basal/bolus.   endo fu     dc planning once cleared by ID

## 2019-03-14 LAB
GLUCOSE BLDC GLUCOMTR-MCNC: 118 MG/DL — HIGH (ref 70–99)
GLUCOSE BLDC GLUCOMTR-MCNC: 148 MG/DL — HIGH (ref 70–99)
GLUCOSE BLDC GLUCOMTR-MCNC: 169 MG/DL — HIGH (ref 70–99)
GLUCOSE BLDC GLUCOMTR-MCNC: 171 MG/DL — HIGH (ref 70–99)
GLUCOSE BLDC GLUCOMTR-MCNC: 94 MG/DL — SIGNIFICANT CHANGE UP (ref 70–99)
VANCOMYCIN FLD-MCNC: 11.6 UG/ML — SIGNIFICANT CHANGE UP

## 2019-03-14 PROCEDURE — 99232 SBSQ HOSP IP/OBS MODERATE 35: CPT

## 2019-03-14 PROCEDURE — 93306 TTE W/DOPPLER COMPLETE: CPT | Mod: 26

## 2019-03-14 RX ORDER — VANCOMYCIN HCL 1 G
1250 VIAL (EA) INTRAVENOUS ONCE
Qty: 0 | Refills: 0 | Status: COMPLETED | OUTPATIENT
Start: 2019-03-14 | End: 2019-03-14

## 2019-03-14 RX ADMIN — Medication 5 MILLIGRAM(S): at 06:10

## 2019-03-14 RX ADMIN — TACROLIMUS 4 MILLIGRAM(S): 5 CAPSULE ORAL at 06:06

## 2019-03-14 RX ADMIN — Medication 200 MILLIGRAM(S): at 21:14

## 2019-03-14 RX ADMIN — Medication 166.67 MILLIGRAM(S): at 18:15

## 2019-03-14 RX ADMIN — Medication 1 TABLET(S): at 17:30

## 2019-03-14 RX ADMIN — Medication 1 TABLET(S): at 06:07

## 2019-03-14 RX ADMIN — HEPARIN SODIUM 5000 UNIT(S): 5000 INJECTION INTRAVENOUS; SUBCUTANEOUS at 21:15

## 2019-03-14 RX ADMIN — INSULIN GLARGINE 30 UNIT(S): 100 INJECTION, SOLUTION SUBCUTANEOUS at 21:52

## 2019-03-14 RX ADMIN — Medication 6 UNIT(S): at 17:29

## 2019-03-14 RX ADMIN — HEPARIN SODIUM 5000 UNIT(S): 5000 INJECTION INTRAVENOUS; SUBCUTANEOUS at 13:00

## 2019-03-14 RX ADMIN — ATORVASTATIN CALCIUM 20 MILLIGRAM(S): 80 TABLET, FILM COATED ORAL at 21:14

## 2019-03-14 RX ADMIN — Medication 200 MILLIGRAM(S): at 06:06

## 2019-03-14 RX ADMIN — TACROLIMUS 4 MILLIGRAM(S): 5 CAPSULE ORAL at 17:30

## 2019-03-14 RX ADMIN — Medication 6 UNIT(S): at 12:55

## 2019-03-14 RX ADMIN — Medication 6 UNIT(S): at 08:51

## 2019-03-14 RX ADMIN — Medication 1: at 17:29

## 2019-03-14 RX ADMIN — Medication 60 MILLIGRAM(S): at 06:07

## 2019-03-14 RX ADMIN — HEPARIN SODIUM 5000 UNIT(S): 5000 INJECTION INTRAVENOUS; SUBCUTANEOUS at 06:05

## 2019-03-14 RX ADMIN — TAMSULOSIN HYDROCHLORIDE 0.4 MILLIGRAM(S): 0.4 CAPSULE ORAL at 21:14

## 2019-03-14 RX ADMIN — INSULIN GLARGINE 24 UNIT(S): 100 INJECTION, SOLUTION SUBCUTANEOUS at 01:37

## 2019-03-14 RX ADMIN — Medication 1: at 12:56

## 2019-03-14 RX ADMIN — Medication 81 MILLIGRAM(S): at 11:40

## 2019-03-14 RX ADMIN — Medication 200 MILLIGRAM(S): at 13:00

## 2019-03-14 NOTE — PROGRESS NOTE ADULT - SUBJECTIVE AND OBJECTIVE BOX
Subjective: Patient seen and examined. No new events except as noted.     SUBJECTIVE/ROS:  No chest pain, dyspnea, palpitation, or dizziness.       MEDICATIONS:  MEDICATIONS  (STANDING):  aspirin enteric coated 81 milliGRAM(s) Oral daily  atorvastatin 20 milliGRAM(s) Oral at bedtime  calcium carbonate   1250 mG (OsCal) 1 Tablet(s) Oral two times a day  dextrose 5%. 1000 milliLiter(s) (50 mL/Hr) IV Continuous <Continuous>  dextrose 50% Injectable 12.5 Gram(s) IV Push once  dextrose 50% Injectable 25 Gram(s) IV Push once  dextrose 50% Injectable 25 Gram(s) IV Push once  heparin  Injectable 5000 Unit(s) SubCutaneous every 8 hours  insulin glargine Injectable (LANTUS) 30 Unit(s) SubCutaneous at bedtime  insulin lispro (HumaLOG) corrective regimen sliding scale   SubCutaneous three times a day before meals  insulin lispro Injectable (HumaLOG) 6 Unit(s) SubCutaneous three times a day before meals  labetalol 200 milliGRAM(s) Oral three times a day  NIFEdipine XL 60 milliGRAM(s) Oral daily  predniSONE   Tablet 5 milliGRAM(s) Oral daily  tacrolimus 4 milliGRAM(s) Oral every 12 hours  tamsulosin 0.4 milliGRAM(s) Oral at bedtime      PHYSICAL EXAM:  T(C): 36.7 (03-14-19 @ 09:26), Max: 36.7 (03-13-19 @ 16:53)  HR: 77 (03-14-19 @ 09:26) (75 - 78)  BP: 162/89 (03-14-19 @ 09:26) (147/84 - 165/88)  RR: 18 (03-14-19 @ 09:26) (18 - 18)  SpO2: 97% (03-14-19 @ 09:26) (97% - 98%)  Wt(kg): --  I&O's Summary    13 Mar 2019 07:01  -  14 Mar 2019 07:00  --------------------------------------------------------  IN: 640 mL / OUT: 1000 mL / NET: -360 mL            JVP: Normal  Neck: supple  Lung: clear   CV: S1 S2 , Murmur:  Abd: soft  Ext: No edema  neuro: Awake / alert  Psych: flat affect  Skin: normal``    LABS/DATA:    CARDIAC MARKERS:                                11.1   4.69  )-----------( 214      ( 13 Mar 2019 08:39 )             37.8     03-13    139  |  104  |  28<H>  ----------------------------<  73  3.7   |  21<L>  |  2.03<H>    Ca    9.2      13 Mar 2019 07:02      proBNP:   Lipid Profile:   HgA1c:   TSH:     TELE:  EKG:    < from: Transthoracic Echocardiogram (04.13.17 @ 13:17) >  Conclusions:  1. Mitral annular calcification, otherwise normal mitral  valve. Minimal mitral regurgitation.  2. Normal left ventricular internal dimensions and wall  thicknesses.  3. Endocardium not well visualized; grossly normal left  ventricular systolic function.  4. The right ventricle is not well visualized.  5. Normal tricuspid valve. Minimal tricuspid regurgitation.    < end of copied text >

## 2019-03-14 NOTE — PROGRESS NOTE ADULT - SUBJECTIVE AND OBJECTIVE BOX
Chief complaint  Patient is a 63y old  Male who presents with a chief complaint of fever (14 Mar 2019 09:49)   Review of systems  Patient in bed, looks comfortable, no fever, no hypoglycemia.    Labs and Fingersticks  CAPILLARY BLOOD GLUCOSE      POCT Blood Glucose.: 118 mg/dL (14 Mar 2019 08:40)  POCT Blood Glucose.: 94 mg/dL (14 Mar 2019 01:26)  POCT Blood Glucose.: 106 mg/dL (13 Mar 2019 23:30)  POCT Blood Glucose.: 73 mg/dL (13 Mar 2019 22:16)  POCT Blood Glucose.: 214 mg/dL (13 Mar 2019 17:19)      Anion Gap, Serum: 14 (03-13 @ 07:02)      Calcium, Total Serum: 9.2 (03-13 @ 07:02)          03-13    139  |  104  |  28<H>  ----------------------------<  73  3.7   |  21<L>  |  2.03<H>    Ca    9.2      13 Mar 2019 07:02                          11.1   4.69  )-----------( 214      ( 13 Mar 2019 08:39 )             37.8     Medications  MEDICATIONS  (STANDING):  aspirin enteric coated 81 milliGRAM(s) Oral daily  atorvastatin 20 milliGRAM(s) Oral at bedtime  calcium carbonate   1250 mG (OsCal) 1 Tablet(s) Oral two times a day  dextrose 5%. 1000 milliLiter(s) (50 mL/Hr) IV Continuous <Continuous>  dextrose 50% Injectable 12.5 Gram(s) IV Push once  dextrose 50% Injectable 25 Gram(s) IV Push once  dextrose 50% Injectable 25 Gram(s) IV Push once  heparin  Injectable 5000 Unit(s) SubCutaneous every 8 hours  insulin glargine Injectable (LANTUS) 30 Unit(s) SubCutaneous at bedtime  insulin lispro (HumaLOG) corrective regimen sliding scale   SubCutaneous three times a day before meals  insulin lispro Injectable (HumaLOG) 6 Unit(s) SubCutaneous three times a day before meals  labetalol 200 milliGRAM(s) Oral three times a day  NIFEdipine XL 60 milliGRAM(s) Oral daily  predniSONE   Tablet 5 milliGRAM(s) Oral daily  tacrolimus 4 milliGRAM(s) Oral every 12 hours  tamsulosin 0.4 milliGRAM(s) Oral at bedtime  vancomycin  IVPB 1250 milliGRAM(s) IV Intermittent once      Physical Exam  General: Patient comfortable in bed  Vital Signs Last 12 Hrs  T(F): 98.1 (03-14-19 @ 09:26), Max: 98.1 (03-14-19 @ 09:26)  HR: 77 (03-14-19 @ 09:26) (75 - 77)  BP: 162/89 (03-14-19 @ 09:26) (147/84 - 162/89)  BP(mean): --  RR: 18 (03-14-19 @ 09:26) (18 - 18)  SpO2: 97% (03-14-19 @ 09:26) (97% - 97%)  Neck: No palpable thyroid nodules.  CVS: S1S2, No murmurs  Respiratory: No wheezing, no crepitations  GI: Abdomen soft, bowel sounds positive  Musculoskeletal:  edema lower extremities.   Skin: No skin rashes, no ecchymosis    Diagnostics

## 2019-03-14 NOTE — PROGRESS NOTE ADULT - SUBJECTIVE AND OBJECTIVE BOX
Patient is a 63y old  Male who presents with a chief complaint of fever (14 Mar 2019 16:40)      INTERVAL HPI/OVERNIGHT EVENTS:  T(C): 36.8 (03-14-19 @ 17:22), Max: 36.8 (03-14-19 @ 17:22)  HR: 75 (03-14-19 @ 17:22) (75 - 77)  BP: 148/84 (03-14-19 @ 17:22) (147/84 - 162/89)  RR: 18 (03-14-19 @ 17:22) (18 - 18)  SpO2: 98% (03-14-19 @ 17:22) (97% - 98%)  Wt(kg): --  I&O's Summary    13 Mar 2019 07:01  -  14 Mar 2019 07:00  --------------------------------------------------------  IN: 640 mL / OUT: 1000 mL / NET: -360 mL        LABS:                        11.1   4.69  )-----------( 214      ( 13 Mar 2019 08:39 )             37.8     03-13    139  |  104  |  28<H>  ----------------------------<  73  3.7   |  21<L>  |  2.03<H>    Ca    9.2      13 Mar 2019 07:02          CAPILLARY BLOOD GLUCOSE      POCT Blood Glucose.: 169 mg/dL (14 Mar 2019 16:57)  POCT Blood Glucose.: 171 mg/dL (14 Mar 2019 12:41)  POCT Blood Glucose.: 118 mg/dL (14 Mar 2019 08:40)  POCT Blood Glucose.: 94 mg/dL (14 Mar 2019 01:26)  POCT Blood Glucose.: 106 mg/dL (13 Mar 2019 23:30)  POCT Blood Glucose.: 73 mg/dL (13 Mar 2019 22:16)            MEDICATIONS  (STANDING):  aspirin enteric coated 81 milliGRAM(s) Oral daily  atorvastatin 20 milliGRAM(s) Oral at bedtime  calcium carbonate   1250 mG (OsCal) 1 Tablet(s) Oral two times a day  dextrose 5%. 1000 milliLiter(s) (50 mL/Hr) IV Continuous <Continuous>  dextrose 50% Injectable 12.5 Gram(s) IV Push once  dextrose 50% Injectable 25 Gram(s) IV Push once  dextrose 50% Injectable 25 Gram(s) IV Push once  heparin  Injectable 5000 Unit(s) SubCutaneous every 8 hours  insulin glargine Injectable (LANTUS) 30 Unit(s) SubCutaneous at bedtime  insulin lispro (HumaLOG) corrective regimen sliding scale   SubCutaneous three times a day before meals  insulin lispro Injectable (HumaLOG) 6 Unit(s) SubCutaneous three times a day before meals  labetalol 200 milliGRAM(s) Oral three times a day  NIFEdipine XL 60 milliGRAM(s) Oral daily  predniSONE   Tablet 5 milliGRAM(s) Oral daily  tacrolimus 4 milliGRAM(s) Oral every 12 hours  tamsulosin 0.4 milliGRAM(s) Oral at bedtime    MEDICATIONS  (PRN):  acetaminophen   Tablet .. 650 milliGRAM(s) Oral every 6 hours PRN Temp greater or equal to 38C (100.4F)  dextrose 40% Gel 15 Gram(s) Oral once PRN Blood Glucose LESS THAN 70 milliGRAM(s)/deciliter  glucagon  Injectable 1 milliGRAM(s) IntraMuscular once PRN Glucose LESS THAN 70 milligrams/deciliter  ondansetron Injectable 4 milliGRAM(s) IV Push every 6 hours PRN Nausea          PHYSICAL EXAM:  GENERAL: NAD, well-groomed, well-developed  HEAD:  Atraumatic, Normocephalic  CHEST/LUNG: Clear to percussion bilaterally; No rales, rhonchi, wheezing, or rubs  HEART: Regular rate and rhythm; No murmurs, rubs, or gallops  ABDOMEN: Soft, Nontender, Nondistended; Bowel sounds present  EXTREMITIES:  2+ Peripheral Pulses, No clubbing, cyanosis, or edema  LYMPH: No lymphadenopathy noted  SKIN: No rashes or lesions    Care Discussed with Consultants/Other Providers [ ] YES  [ ] NO

## 2019-03-14 NOTE — PHYSICAL THERAPY INITIAL EVALUATION ADULT - PERTINENT HX OF CURRENT PROBLEM, REHAB EVAL
Pt is a 63 M admitted to [facility] on 3/8/19 with PMH ESRD s/p renal transplant 2011 @ [facility] (here as transplant nephro is here), T2DM, HTN, HLD, decreased visual acuity bilaterally p/w 2d fever, chills, weakness, N/V x 3 days  without cough, dysuria, headache or abdominal pain. No other associated symptoms  abd/pelvis CT: non specific fat stranding surrounding the transplant kidney

## 2019-03-14 NOTE — DIETITIAN INITIAL EVALUATION ADULT. - NS AS NUTRI INTERV MEALS SNACK
Carbohydrate - modified diet/Continue consistent carbohydrate + DASH diet/Fat - modified diet/Mineral - modified diet

## 2019-03-14 NOTE — PHYSICAL THERAPY INITIAL EVALUATION ADULT - GENERAL OBSERVATIONS, REHAB EVAL
BS reviewed. Pt received sitting in chair, +IVL, +do not use pink band RUE, +legally blind (none R eye, poor vision L eye), requires guidance/assist t/o mobility for directioning.

## 2019-03-14 NOTE — DIETITIAN INITIAL EVALUATION ADULT. - PERTINENT MEDS FT
atorvastatin  calcium carbonate   1250 mG (OsCal)  glucagon  Injectable PRN  insulin glargine Injectable (LANTUS)  insulin lispro (HumaLOG) corrective regimen sliding scale  insulin lispro Injectable (HumaLOG)  ondansetron Injectable PRN  predniSONE   Tablet  tacrolimus

## 2019-03-14 NOTE — PHYSICAL THERAPY INITIAL EVALUATION ADULT - DISCHARGE DISPOSITION, PT EVAL
DC home with no skilled PT needs, assist/supervision from family (2* legally blind for direction/guidance), owns walking/blind stick (pt reports does not really use), spoke about service dog for assistance, IDANIA Nunes NP to be notified./no skilled PT needs

## 2019-03-14 NOTE — PROGRESS NOTE ADULT - SUBJECTIVE AND OBJECTIVE BOX
Interval Events: pt seen and examined. He denies abdominal pain, n/v  1 episode of loose still this morning.   Tolerating PO well    MEDICATIONS  (STANDING):  aspirin enteric coated 81 milliGRAM(s) Oral daily  atorvastatin 20 milliGRAM(s) Oral at bedtime  calcium carbonate   1250 mG (OsCal) 1 Tablet(s) Oral two times a day  dextrose 5%. 1000 milliLiter(s) (50 mL/Hr) IV Continuous <Continuous>  dextrose 50% Injectable 12.5 Gram(s) IV Push once  dextrose 50% Injectable 25 Gram(s) IV Push once  dextrose 50% Injectable 25 Gram(s) IV Push once  heparin  Injectable 5000 Unit(s) SubCutaneous every 8 hours  insulin glargine Injectable (LANTUS) 30 Unit(s) SubCutaneous at bedtime  insulin lispro (HumaLOG) corrective regimen sliding scale   SubCutaneous three times a day before meals  insulin lispro Injectable (HumaLOG) 6 Unit(s) SubCutaneous three times a day before meals  labetalol 200 milliGRAM(s) Oral three times a day  NIFEdipine XL 60 milliGRAM(s) Oral daily  predniSONE   Tablet 5 milliGRAM(s) Oral daily  tacrolimus 4 milliGRAM(s) Oral every 12 hours  tamsulosin 0.4 milliGRAM(s) Oral at bedtime    MEDICATIONS  (PRN):  acetaminophen   Tablet .. 650 milliGRAM(s) Oral every 6 hours PRN Temp greater or equal to 38C (100.4F)  dextrose 40% Gel 15 Gram(s) Oral once PRN Blood Glucose LESS THAN 70 milliGRAM(s)/deciliter  glucagon  Injectable 1 milliGRAM(s) IntraMuscular once PRN Glucose LESS THAN 70 milligrams/deciliter  ondansetron Injectable 4 milliGRAM(s) IV Push every 6 hours PRN Nausea      Allergies    No Known Drug Allergies  Seafood (Pruritus; Rash)    Intolerances        Review of Systems:    General:  No wt loss, fevers, chills, night sweats,fatigue,   Eyes:  Good vision, no reported pain  ENT:  No sore throat, pain, runny nose, dysphagia  CV:  No pain, palpitations, hypo/hypertension  Resp:  No dyspnea, cough, tachypnea, wheezing  GI:  No pain, No nausea, No vomiting, No diarrhea, No constipation, No weight loss, No fever, No pruritis, No rectal bleeding, No melena, No dysphagia  :  No pain, bleeding, incontinence, nocturia  Muscle:  No pain, weakness  Neuro:  No weakness, tingling, memory problems  Psych:  No fatigue, insomnia, mood problems, depression  Endocrine:  No polyuria, polydypsia, cold/heat intolerance  Heme:  No petechiae, ecchymosis, easy bruisability  Skin:  No rash, tattoos, scars, edema      Vital Signs Last 24 Hrs  T(C): 36.7 (14 Mar 2019 09:26), Max: 36.7 (13 Mar 2019 16:53)  T(F): 98.1 (14 Mar 2019 09:26), Max: 98.1 (13 Mar 2019 21:46)  HR: 77 (14 Mar 2019 09:26) (75 - 78)  BP: 162/89 (14 Mar 2019 09:26) (147/84 - 165/88)  BP(mean): --  RR: 18 (14 Mar 2019 09:26) (18 - 18)  SpO2: 97% (14 Mar 2019 09:26) (97% - 98%)    PHYSICAL EXAM:    Constitutional: NAD, well-developed  HEENT: EOMI, throat clear  Neck: No LAD, supple  Respiratory: CTA and P  Cardiovascular: S1 and S2, RRR, no M  Gastrointestinal: BS+, soft, NT/ND, neg HSM,  Extremities: No peripheral edema, neg clubing, cyanosis  Vascular: 2+ peripheral pulses  Neurological: A/O x 3, no focal deficits  Psychiatric: Normal mood, normal affect  Skin: No rashes      LABS:                        11.1   4.69  )-----------( 214      ( 13 Mar 2019 08:39 )             37.8     03-13    139  |  104  |  28<H>  ----------------------------<  73  3.7   |  21<L>  |  2.03<H>    Ca    9.2      13 Mar 2019 07:02            RADIOLOGY & ADDITIONAL TESTS:

## 2019-03-14 NOTE — DIETITIAN INITIAL EVALUATION ADULT. - ADHERENCE
Pt does not follow any modified diet for T2DM management; States he is aware of what he should do but doesn't; Diet recall high in saturated fat and carbohydrates; typically eats out 2-3x week ([employer], [employer], [employer]); States when he is eating "badly" he has pasta/cakes/cookies; HgbA1c 9.2% indicates poor control/poor

## 2019-03-14 NOTE — DIETITIAN INITIAL EVALUATION ADULT. - NS AS NUTRI INTERV ED CONTENT
Nutrition relationship to health/disease/Pt educated on consistent carbohydrate, heart healthy nutrition education; recommended small changes to gradually transition to healthier diet/life style; encouraged decreasing meals away from the home by x1/week, substituting baked for fried foods, encouraged following up with outpatient endocrinologist/RDN Nutrition relationship to health/disease/Pt educated on consistent carbohydrate, heart healthy nutrition education; recommended small changes to gradually transition to healthier diet/life style; encouraged decreasing meals away from the home by x1/week, substituting baked for fried foods, encouraged following up with outpatient endocrinologist/RDN; Handouts provided Pt educated on consistent carbohydrate, heart healthy nutrition education; recommended small changes to gradually transition to healthier diet/life style; encouraged decreasing meals away from the home by x1/week, substituting baked for fried foods, encouraged following up with outpatient endocrinologist/RDN; Handouts provided to review with wife/Nutrition relationship to health/disease

## 2019-03-14 NOTE — PROGRESS NOTE ADULT - ASSESSMENT
63 M with DM and ESRD s/p renal transplant 2011 on Cellcept, Tacrolimus and Prednisone, admitted 3/7/19 with acute fevers. Associated vomiting and one episode diarrhea. Daughter-in-law with brief febrile illness one week prior.   Here 101.6F. Nontoxic but leukocytosis to 14 and worsening renal function  negative  UA, CXR, RVP.   abd/pelvis CT: non specific fat stranding surrounding the transplant kidney  blood cx: high grade coag neg staph, urine also coag neg staph    renal transplant immunocompromised pt with sepsis, fever, leukocytosis, BENEDICTO and transplant  fat stranding concerning for pyelonephritis  high grade coag neg staph bacteremia and ?UTI and transplant pyelonephritis     Recommend:  -Redose vancomycin today  -Continue to dose by level  -F/U TTE  -Anticipate a 2-week course of vancomycin IV to complete 3/21/19

## 2019-03-14 NOTE — DIETITIAN INITIAL EVALUATION ADULT. - ENERGY NEEDS
Height (cm): 187.96 (03-08)  Weight (kg): 129.7 (03-08)  BMI (kg/m2): 36.7 (03-08)  IBW (kg): 81 +/-10%   % IBW: 159.3  No pressure injuries, edema +1 B/L feet, +1 B/L hands noted per nursing flow sheet Height (cm): 187.96 (03-08)  Weight (kg): 129.7 (03-08)  BMI (kg/m2): 36.7 (03-08)  IBW (kg): 86 +/-10%   % IBW: 151  No pressure injuries, edema +1 B/L feet, +1 B/L hands noted per nursing flow sheet

## 2019-03-14 NOTE — DIETITIAN INITIAL EVALUATION ADULT. - OTHER INFO
Pt seen for length of stay on 8 [facility]. Pt with PMHx ESRD, s/p renal transplant (2011), HTN, HLD. Presented with nausea/vomiting x3 days PTA. Pt interviewed at bedside. No nausea/vomiting/constipation or chewing/swallowing problems reported. Pt reported x4 loose BM today (3-14). Pt confirmed shellfish allergy, stated he would get a scratchy throat / hives upon consumption. Pt able to teach back insulin regimen PTA. Unable to confirm frequency of BG testing, Pt states his wife helps because he cannot read the meter, but believes she resents his condition. Pt typically walks 5 miles/day with a friend for exercise. States he cannot count carbohydrates in meals because he cannot see. Pt amendable to diet education for consistent carbohydrate diet. Pt reported he wants to start Nutrisystem to receive pre-balanced meals like when in the hospital to control T2DM. Pt requested information on diabetes outpatient clinic, RDN contact information. Pt made aware RDN to remain available. Pt seen for length of stay on 8 [facility]. Pt with PMHx ESRD, s/p renal transplant (2011), HTN, HLD. Presented with nausea/vomiting x3 days PTA. Pt interviewed at bedside. No nausea/vomiting/constipation or chewing/swallowing problems reported. Pt reported x4 loose BM today (3-14). Pt confirmed shellfish allergy, stated he would get a scratchy throat / hives upon consumption. Pt able to teach back insulin regimen PTA. Unable to confirm frequency of BG testing, Pt states his wife helps because he cannot read the meter, but believes she resents his condition. Pt typically walks 5 miles/day with a friend for exercise. States he cannot count carbohydrates in meals because he cannot see. Pt expressed desire to improve his health, stated his only barrier is self-motivation. Pt amendable to diet education for consistent carbohydrate diet. Pt reported he wants to start Nutrisystem to receive pre-balanced meals like when in the hospital to control T2DM. Pt requested information on diabetes outpatient clinic, RDN contact information. Pt made aware RDN to remain available. Pt seen for length of stay on 8 [facility]. Pt with PMHx ESRD, s/p renal transplant (2011), HTN, HLD. Presented with nausea/vomiting x3 days PTA. Pt interviewed at bedside. No nausea/vomiting/constipation or chewing/swallowing problems reported. Pt reported x4 loose BM today (3-14). Pt confirmed shellfish allergy, stated he would get a scratchy throat / hives upon consumption. Pt able to teach back insulin regimen PTA. Unable to confirm frequency of BG testing, Pt states his wife helps because he cannot read the meter or manage lancets, but believes she resents his condition. Pt typically walks 5 miles/day with a friend for exercise. States he cannot count carbohydrates in meals because he cannot see. Pt expressed desire to improve his health, stated his only barrier is self-motivation. Pt amendable to diet education for consistent carbohydrate diet. Pt reported he wants to start Nutrisystem to receive pre-balanced meals like when in the hospital to control T2DM. Pt requested information on diabetes outpatient clinic, RDN contact information. Pt made aware RDN to remain available.

## 2019-03-14 NOTE — DIETITIAN INITIAL EVALUATION ADULT. - NS AS NUTRI INTERV COLLABORAT
Information provided on Diabetes Wellness Program, handout provided/Referral to community agencies/programs (specify)

## 2019-03-14 NOTE — PROGRESS NOTE ADULT - SUBJECTIVE AND OBJECTIVE BOX
CC: Patient is a 63y old  Male who presents with a chief complaint of fever (14 Mar 2019 15:40)    ID following for bacteremia, pyelonephritis    Interval History/ROS: Patient feels well. Has no new complaints. Denies fever, chills.    Rest of ROS negative.    Allergies  No Known Drug Allergies  Seafood (Pruritus; Rash)    ANTIMICROBIALS:      OTHER MEDS:  acetaminophen   Tablet .. 650 milliGRAM(s) Oral every 6 hours PRN  aspirin enteric coated 81 milliGRAM(s) Oral daily  atorvastatin 20 milliGRAM(s) Oral at bedtime  calcium carbonate   1250 mG (OsCal) 1 Tablet(s) Oral two times a day  dextrose 40% Gel 15 Gram(s) Oral once PRN  dextrose 5%. 1000 milliLiter(s) IV Continuous <Continuous>  dextrose 50% Injectable 12.5 Gram(s) IV Push once  dextrose 50% Injectable 25 Gram(s) IV Push once  dextrose 50% Injectable 25 Gram(s) IV Push once  glucagon  Injectable 1 milliGRAM(s) IntraMuscular once PRN  heparin  Injectable 5000 Unit(s) SubCutaneous every 8 hours  insulin glargine Injectable (LANTUS) 30 Unit(s) SubCutaneous at bedtime  insulin lispro (HumaLOG) corrective regimen sliding scale   SubCutaneous three times a day before meals  insulin lispro Injectable (HumaLOG) 6 Unit(s) SubCutaneous three times a day before meals  labetalol 200 milliGRAM(s) Oral three times a day  NIFEdipine XL 60 milliGRAM(s) Oral daily  ondansetron Injectable 4 milliGRAM(s) IV Push every 6 hours PRN  predniSONE   Tablet 5 milliGRAM(s) Oral daily  tacrolimus 4 milliGRAM(s) Oral every 12 hours  tamsulosin 0.4 milliGRAM(s) Oral at bedtime    PE:    Vital Signs Last 24 Hrs  T(C): 36.7 (14 Mar 2019 09:26), Max: 36.7 (13 Mar 2019 16:53)  T(F): 98.1 (14 Mar 2019 09:26), Max: 98.1 (13 Mar 2019 21:46)  HR: 77 (14 Mar 2019 09:26) (75 - 78)  BP: 162/89 (14 Mar 2019 09:26) (147/84 - 165/88)  BP(mean): --  RR: 18 (14 Mar 2019 09:26) (18 - 18)  SpO2: 97% (14 Mar 2019 09:26) (97% - 98%)    Gen: AOx3, NAD, non-toxic  CV: S1+S2 normal, no murmurs  Resp: Clear bilat, no resp distress  Abd: Soft, nontender, +BS  Ext: No LE edema, no wounds  : No Coker  IV/Skin: No thrombophlebitis  Neuro: no focal deficits    LABS:                          11.1   4.69  )-----------( 214      ( 13 Mar 2019 08:39 )             37.8       03-13    139  |  104  |  28<H>  ----------------------------<  73  3.7   |  21<L>  |  2.03<H>    Ca    9.2      13 Mar 2019 07:02    MICROBIOLOGY:  Vancomycin Level, Random: 11.6 ug/mL (03-14-19 @ 07:22)  v  .Blood Blood-Peripheral  03-12-19   No growth to date.  --  --      .Urine Clean Catch (Midstream)  03-08-19   >100,000 CFU/ml Coag Negative Staphylococcus  "Susceptibilities not performed"  --  --      .Blood Blood  03-08-19   Growth in aerobic and anaerobic bottles: Staphylococcus epidermidis  --  Blood Culture PCR  Coag Negative Staphylococcus    Rapid RVP Result: NotDetec (03-08 @ 11:59)    RADIOLOGY:    < from: CT Abdomen and Pelvis No Cont (03.08.19 @ 20:05) >  IMPRESSION:     Right pelvic transplant kidney with nonspecific surrounding fat   stranding.No hydronephrosis. In the setting of fever, consider   pyelonephritis. Correlation with urinalysis is recommended.    No appendicitis, colitis, or bowel obstruction.    < end of copied text >

## 2019-03-14 NOTE — PROGRESS NOTE ADULT - ASSESSMENT
chest pain   likely due to nausea and esophagitis   no biomarker evidence of acute MI   GI eval noted  echo unremarkable     Fever  work up as per ID   no fever recently   on anbx  echo unremarkable     HTN  cont current meds     CKD  s/p transplant  fu with renal

## 2019-03-15 ENCOUNTER — TRANSCRIPTION ENCOUNTER (OUTPATIENT)
Age: 64
End: 2019-03-15

## 2019-03-15 LAB
ANION GAP SERPL CALC-SCNC: 13 MMOL/L — SIGNIFICANT CHANGE UP (ref 5–17)
BUN SERPL-MCNC: 26 MG/DL — HIGH (ref 7–23)
CALCIUM SERPL-MCNC: 9 MG/DL — SIGNIFICANT CHANGE UP (ref 8.4–10.5)
CHLORIDE SERPL-SCNC: 106 MMOL/L — SIGNIFICANT CHANGE UP (ref 96–108)
CO2 SERPL-SCNC: 22 MMOL/L — SIGNIFICANT CHANGE UP (ref 22–31)
CREAT SERPL-MCNC: 2.1 MG/DL — HIGH (ref 0.5–1.3)
GLUCOSE BLDC GLUCOMTR-MCNC: 103 MG/DL — HIGH (ref 70–99)
GLUCOSE BLDC GLUCOMTR-MCNC: 116 MG/DL — HIGH (ref 70–99)
GLUCOSE BLDC GLUCOMTR-MCNC: 143 MG/DL — HIGH (ref 70–99)
GLUCOSE BLDC GLUCOMTR-MCNC: 207 MG/DL — HIGH (ref 70–99)
GLUCOSE BLDC GLUCOMTR-MCNC: 72 MG/DL — SIGNIFICANT CHANGE UP (ref 70–99)
GLUCOSE BLDC GLUCOMTR-MCNC: 89 MG/DL — SIGNIFICANT CHANGE UP (ref 70–99)
GLUCOSE BLDC GLUCOMTR-MCNC: 93 MG/DL — SIGNIFICANT CHANGE UP (ref 70–99)
GLUCOSE SERPL-MCNC: 57 MG/DL — LOW (ref 70–99)
POTASSIUM SERPL-MCNC: 4.1 MMOL/L — SIGNIFICANT CHANGE UP (ref 3.5–5.3)
POTASSIUM SERPL-SCNC: 4.1 MMOL/L — SIGNIFICANT CHANGE UP (ref 3.5–5.3)
SODIUM SERPL-SCNC: 141 MMOL/L — SIGNIFICANT CHANGE UP (ref 135–145)
TACROLIMUS SERPL-MCNC: 7.9 NG/ML — SIGNIFICANT CHANGE UP
VANCOMYCIN FLD-MCNC: 16.6 UG/ML — SIGNIFICANT CHANGE UP

## 2019-03-15 PROCEDURE — 36558 INSERT TUNNELED CV CATH: CPT | Mod: LT

## 2019-03-15 PROCEDURE — 77001 FLUOROGUIDE FOR VEIN DEVICE: CPT | Mod: 26

## 2019-03-15 PROCEDURE — 99232 SBSQ HOSP IP/OBS MODERATE 35: CPT

## 2019-03-15 PROCEDURE — 76937 US GUIDE VASCULAR ACCESS: CPT | Mod: 26

## 2019-03-15 RX ORDER — INSULIN GLARGINE 100 [IU]/ML
12 INJECTION, SOLUTION SUBCUTANEOUS ONCE
Qty: 0 | Refills: 0 | Status: COMPLETED | OUTPATIENT
Start: 2019-03-15 | End: 2019-03-15

## 2019-03-15 RX ORDER — LABETALOL HCL 100 MG
300 TABLET ORAL THREE TIMES A DAY
Qty: 0 | Refills: 0 | Status: DISCONTINUED | OUTPATIENT
Start: 2019-03-15 | End: 2019-03-18

## 2019-03-15 RX ORDER — CHLORHEXIDINE GLUCONATE 213 G/1000ML
1 SOLUTION TOPICAL
Qty: 0 | Refills: 0 | Status: DISCONTINUED | OUTPATIENT
Start: 2019-03-15 | End: 2019-03-18

## 2019-03-15 RX ORDER — VANCOMYCIN HCL 1 G
1250 VIAL (EA) INTRAVENOUS ONCE
Qty: 0 | Refills: 0 | Status: COMPLETED | OUTPATIENT
Start: 2019-03-15 | End: 2019-03-15

## 2019-03-15 RX ORDER — TACROLIMUS 5 MG/1
3 CAPSULE ORAL EVERY 12 HOURS
Qty: 0 | Refills: 0 | Status: DISCONTINUED | OUTPATIENT
Start: 2019-03-15 | End: 2019-03-18

## 2019-03-15 RX ADMIN — TACROLIMUS 4 MILLIGRAM(S): 5 CAPSULE ORAL at 17:48

## 2019-03-15 RX ADMIN — Medication 81 MILLIGRAM(S): at 13:04

## 2019-03-15 RX ADMIN — INSULIN GLARGINE 12 UNIT(S): 100 INJECTION, SOLUTION SUBCUTANEOUS at 23:28

## 2019-03-15 RX ADMIN — TAMSULOSIN HYDROCHLORIDE 0.4 MILLIGRAM(S): 0.4 CAPSULE ORAL at 21:24

## 2019-03-15 RX ADMIN — Medication 60 MILLIGRAM(S): at 05:41

## 2019-03-15 RX ADMIN — Medication 200 MILLIGRAM(S): at 05:41

## 2019-03-15 RX ADMIN — ATORVASTATIN CALCIUM 20 MILLIGRAM(S): 80 TABLET, FILM COATED ORAL at 21:24

## 2019-03-15 RX ADMIN — Medication 5 MILLIGRAM(S): at 05:41

## 2019-03-15 RX ADMIN — Medication 200 MILLIGRAM(S): at 13:04

## 2019-03-15 RX ADMIN — Medication 6 UNIT(S): at 13:03

## 2019-03-15 RX ADMIN — TACROLIMUS 4 MILLIGRAM(S): 5 CAPSULE ORAL at 05:41

## 2019-03-15 RX ADMIN — Medication 6 UNIT(S): at 08:57

## 2019-03-15 RX ADMIN — HEPARIN SODIUM 5000 UNIT(S): 5000 INJECTION INTRAVENOUS; SUBCUTANEOUS at 21:23

## 2019-03-15 RX ADMIN — Medication 300 MILLIGRAM(S): at 21:24

## 2019-03-15 RX ADMIN — HEPARIN SODIUM 5000 UNIT(S): 5000 INJECTION INTRAVENOUS; SUBCUTANEOUS at 05:41

## 2019-03-15 RX ADMIN — HEPARIN SODIUM 5000 UNIT(S): 5000 INJECTION INTRAVENOUS; SUBCUTANEOUS at 13:04

## 2019-03-15 RX ADMIN — Medication 1 TABLET(S): at 17:48

## 2019-03-15 RX ADMIN — Medication 6 UNIT(S): at 17:48

## 2019-03-15 RX ADMIN — Medication 2: at 13:03

## 2019-03-15 RX ADMIN — Medication 166.67 MILLIGRAM(S): at 14:47

## 2019-03-15 RX ADMIN — Medication 1 TABLET(S): at 05:41

## 2019-03-15 NOTE — DISCHARGE NOTE PROVIDER - NSDCCPCAREPLAN_GEN_ALL_CORE_FT
PRINCIPAL DISCHARGE DIAGNOSIS  Diagnosis: Sepsis  Assessment and Plan of Treatment:       SECONDARY DISCHARGE DIAGNOSES  Diagnosis: BENEDICTO (acute kidney injury)  Assessment and Plan of Treatment: CT abdomen and pelvis done on 3/8 shows no hydronephrosis.  vancomycin levels. Continue to hold ARB.  Avoid taking (NSAIDs) - (ex: Ibuprofen, Advil, Celebrex, Naprosyn)  Avoid taking any nephrotoxic agents (can harm kidneys) - Intravenous contrast for diagnostic testing, combination cold medications.  Have all medications adjusted for your renal function by your Health Care Provider.  Blood pressure control is important.  Take all medication as prescribed.      Diagnosis: Immunosuppressed status  Assessment and Plan of Treatment: Continue immunosuppression medications  Follow up with Nephrology    Diagnosis: Type 2 diabetes mellitus  Assessment and Plan of Treatment: HgA1C this admission 9.2  Make sure you get your HgA1c checked every three months.  If you take oral diabetes medications, check your blood glucose two times a day.  If you take insulin, check your blood glucose before meals and at bedtime.  It's important not to skip any meals.  Keep a log of your blood glucose results and always take it with you to your doctor appointments.  Keep a list of your current medications including injectables and over the counter medications and bring this medication list with you to all your doctor appointments.  If you have not seen your ophthalmologist this year call for appointment.  Check your feet daily for redness, sores, or openings. Do not self treat. If no improvement in two days call your primary care physician for an appointment.  Low blood sugar (hypoglycemia) is a blood sugar below 70mg/dl. Check your blood sugar if you feel signs/symptoms of hypoglycemia. If your blood sugar is below 70 take 15 grams of carbohydrates (ex 4 oz of apple juice, 3-4 glucose tablets, or 4-6 oz of regular soda) wait 15 minutes and repeat blood sugar to make sure it comes up above 70.  If your blood sugar is above 70 and you are due for a meal, have a meal.  If you are not due for a meal have a snack.  This snack helps keeps your blood sugar at a safe range.      Diagnosis: Hypertension  Assessment and Plan of Treatment: Low salt diet  Activity as tolerated.  Take all medication as prescribed.  Follow up with your medical doctor for routine blood pressure monitoring at your next visit.  Notify your doctor if you have any of the following symptoms:   Dizziness, Lightheadedness, Blurry vision, Headache, Chest pain, Shortness of breath PRINCIPAL DISCHARGE DIAGNOSIS  Diagnosis: Sepsis  Assessment and Plan of Treatment: Continue Vancomycin  Check vancomycin level, random as prescribed  check CBC, CMP as prescribed  follow up with infectious disease      SECONDARY DISCHARGE DIAGNOSES  Diagnosis: BENEDICTO (acute kidney injury)  Assessment and Plan of Treatment: CT abdomen and pelvis done on 3/8 shows no hydronephrosis.  vancomycin levels. Continue to hold ARB.  Avoid taking (NSAIDs) - (ex: Ibuprofen, Advil, Celebrex, Naprosyn)  Avoid taking any nephrotoxic agents (can harm kidneys) - Intravenous contrast for diagnostic testing, combination cold medications.  Have all medications adjusted for your renal function by your Health Care Provider.  Blood pressure control is important.  Take all medication as prescribed.      Diagnosis: Immunosuppressed status  Assessment and Plan of Treatment: Continue immunosuppression medications  Follow up with Nephrology    Diagnosis: Type 2 diabetes mellitus  Assessment and Plan of Treatment: HgA1C this admission 9.2  Make sure you get your HgA1c checked every three months.  If you take oral diabetes medications, check your blood glucose two times a day.  If you take insulin, check your blood glucose before meals and at bedtime.  It's important not to skip any meals.  Keep a log of your blood glucose results and always take it with you to your doctor appointments.  Keep a list of your current medications including injectables and over the counter medications and bring this medication list with you to all your doctor appointments.  If you have not seen your ophthalmologist this year call for appointment.  Check your feet daily for redness, sores, or openings. Do not self treat. If no improvement in two days call your primary care physician for an appointment.  Low blood sugar (hypoglycemia) is a blood sugar below 70mg/dl. Check your blood sugar if you feel signs/symptoms of hypoglycemia. If your blood sugar is below 70 take 15 grams of carbohydrates (ex 4 oz of apple juice, 3-4 glucose tablets, or 4-6 oz of regular soda) wait 15 minutes and repeat blood sugar to make sure it comes up above 70.  If your blood sugar is above 70 and you are due for a meal, have a meal.  If you are not due for a meal have a snack.  This snack helps keeps your blood sugar at a safe range.      Diagnosis: Hypertension  Assessment and Plan of Treatment: Low salt diet  Activity as tolerated.  Take all medication as prescribed.  Follow up with your medical doctor for routine blood pressure monitoring at your next visit.  Notify your doctor if you have any of the following symptoms:   Dizziness, Lightheadedness, Blurry vision, Headache, Chest pain, Shortness of breath PRINCIPAL DISCHARGE DIAGNOSIS  Diagnosis: Sepsis  Assessment and Plan of Treatment: Continue Vancomycin through 3/21.  Check CBC, CMP as prescribed on Friday 3/22 and have results faxed to Dr Dias at [phone].  Follow up with infectious disease clinic in 2-3 weeks. Call for appointment [phone].      SECONDARY DISCHARGE DIAGNOSES  Diagnosis: Hypertension  Assessment and Plan of Treatment: Low salt diet  Activity as tolerated.  Take all medication as prescribed.  Follow up with your medical doctor for routine blood pressure monitoring at your next visit.  Notify your doctor if you have any of the following symptoms:   Dizziness, Lightheadedness, Blurry vision, Headache, Chest pain, Shortness of breath      Diagnosis: Type 2 diabetes mellitus  Assessment and Plan of Treatment: HgA1C this admission 9.2  You blood sugar levels were low during this admission and your insulin dosages were changed. Your short acting insulin dose was decreased and your long acting insulin was stopped. Follow up with your insulin prescriber with your finger stick blood sugar logs within 2 weeks. Call sooner if your home blood sugars are running higher or lower than usual.  Make sure you get your HgA1c checked every three months.  If you take oral diabetes medications, check your blood glucose two times a day.  If you take insulin, check your blood glucose before meals and at bedtime.  It's important not to skip any meals.  Keep a log of your blood glucose results and always take it with you to your doctor appointments.  Keep a list of your current medications including injectables and over the counter medications and bring this medication list with you to all your doctor appointments.  If you have not seen your ophthalmologist this year call for appointment.  Check your feet daily for redness, sores, or openings. Do not self treat. If no improvement in two days call your primary care physician for an appointment.  Low blood sugar (hypoglycemia) is a blood sugar below 70mg/dl. Check your blood sugar if you feel signs/symptoms of hypoglycemia. If your blood sugar is below 70 take 15 grams of carbohydrates (ex 4 oz of apple juice, 3-4 glucose tablets, or 4-6 oz of regular soda) wait 15 minutes and repeat blood sugar to make sure it comes up above 70.  If your blood sugar is above 70 and you are due for a meal, have a meal.  If you are not due for a meal have a snack.  This snack helps keeps your blood sugar at a safe range.      Diagnosis: Immunosuppressed status  Assessment and Plan of Treatment: Continue immunosuppression medications  Follow up with Nephrology    Diagnosis: BENEDICTO (acute kidney injury)  Assessment and Plan of Treatment: Some of your medications have been changed because of your acute kidney injury. Follow the medication reconciliation list carefully. Follow up with your Nephrologist within 2 weeks.   CT abdomen and pelvis done on 3/8 shows no hydronephrosis.  vancomycin levels. Continue to hold ARB.  Avoid taking (NSAIDs) - (ex: Ibuprofen, Advil, Celebrex, Naprosyn)  Avoid taking any nephrotoxic agents (can harm kidneys) - Intravenous contrast for diagnostic testing, combination cold medications.  Have all medications adjusted for your renal function by your Health Care Provider.  Blood pressure control is important.  Take all medication as prescribed.

## 2019-03-15 NOTE — PROGRESS NOTE ADULT - SUBJECTIVE AND OBJECTIVE BOX
Interventional Radiology Pre-Procedure Note    This is a 63y Male with PMH of ESRD s/p renal transplant in 2011, admitted with BENEDICTO on CKD. Pt found to have bacteremia on vancomycin requiring access for abx therapy. Blood cx neg x48hrs from 3/12. ID clearance appreciated.     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 and limited vision in left eye  Bell's palsy: 2004  ESRD (end stage renal disease): s/p renal transplant  Type 2 diabetes mellitus  Obesity  Hypertension  Dyslipidemia  Diabetic neuropathy associated with type 2 diabetes mellitus  Benign prostatic hypertrophy  History of detached retina repair: right eye  S/P TURP  S/P kidney transplant: 2011     Vital Signs Last 24 Hrs  T(C): 36.7 (15 Mar 2019 08:09), Max: 36.8 (14 Mar 2019 17:22)  T(F): 98.1 (15 Mar 2019 08:09), Max: 98.3 (14 Mar 2019 17:22)  HR: 76 (15 Mar 2019 08:09) (73 - 84)  BP: 167/85 (15 Mar 2019 08:09) (148/84 - 167/85)  RR: 18 (15 Mar 2019 08:09) (18 - 18)  SpO2: 97% (15 Mar 2019 08:09) (97% - 98%)    Allergies: No Known Drug Allergies  Seafood (Pruritus; Rash)    Physical Exam: Gen: NAD, A&Ox3    Labs:     Complete Blood Count in AM (03.13.19 @ 08:39)    WBC Count: 4.69 K/uL    RBC Count: 5.12 M/uL    Hemoglobin: 11.1 g/dL    Hematocrit: 37.8 %    Mean Cell Volume: 73.8 fl    Mean Cell Hemoglobin: 21.7 pg    Mean Cell Hemoglobin Conc: 29.4 gm/dL    Red Cell Distrib Width: 14.9 %    Platelet Count - Automated: 214 K/uL        03-15    141  |  106  |  26<H>  ----------------------------<  57<L>  4.1   |  22  |  2.10<H>    Ca    9.0      15 Mar 2019 05:31        Plan is for tunnelled CVC placement. The full procedure/ risks/ benefits/ alternative's were discussed with the pt and verbal consent obtained. All questions and concerns have been addressed at this time.

## 2019-03-15 NOTE — PROGRESS NOTE ADULT - SUBJECTIVE AND OBJECTIVE BOX
Interval Events: pt seen and examined. He denies abdominal pain, n/v  + formed brown bm this morning   Tolerating PO well    MEDICATIONS  (STANDING):  aspirin enteric coated 81 milliGRAM(s) Oral daily  atorvastatin 20 milliGRAM(s) Oral at bedtime  calcium carbonate   1250 mG (OsCal) 1 Tablet(s) Oral two times a day  dextrose 5%. 1000 milliLiter(s) (50 mL/Hr) IV Continuous <Continuous>  dextrose 50% Injectable 12.5 Gram(s) IV Push once  dextrose 50% Injectable 25 Gram(s) IV Push once  dextrose 50% Injectable 25 Gram(s) IV Push once  heparin  Injectable 5000 Unit(s) SubCutaneous every 8 hours  insulin glargine Injectable (LANTUS) 30 Unit(s) SubCutaneous at bedtime  insulin lispro (HumaLOG) corrective regimen sliding scale   SubCutaneous three times a day before meals  insulin lispro Injectable (HumaLOG) 6 Unit(s) SubCutaneous three times a day before meals  labetalol 200 milliGRAM(s) Oral three times a day  NIFEdipine XL 60 milliGRAM(s) Oral daily  predniSONE   Tablet 5 milliGRAM(s) Oral daily  tacrolimus 4 milliGRAM(s) Oral every 12 hours  tamsulosin 0.4 milliGRAM(s) Oral at bedtime    MEDICATIONS  (PRN):  acetaminophen   Tablet .. 650 milliGRAM(s) Oral every 6 hours PRN Temp greater or equal to 38C (100.4F)  dextrose 40% Gel 15 Gram(s) Oral once PRN Blood Glucose LESS THAN 70 milliGRAM(s)/deciliter  glucagon  Injectable 1 milliGRAM(s) IntraMuscular once PRN Glucose LESS THAN 70 milligrams/deciliter  ondansetron Injectable 4 milliGRAM(s) IV Push every 6 hours PRN Nausea      Allergies    No Known Drug Allergies  Seafood (Pruritus; Rash)    Intolerances        Review of Systems:    General:  No wt loss, fevers, chills, night sweats,fatigue,   Eyes:  Good vision, no reported pain  ENT:  No sore throat, pain, runny nose, dysphagia  CV:  No pain, palpitations, hypo/hypertension  Resp:  No dyspnea, cough, tachypnea, wheezing  GI:  No pain, No nausea, No vomiting, No diarrhea, No constipation, No weight loss, No fever, No pruritis, No rectal bleeding, No melena, No dysphagia  :  No pain, bleeding, incontinence, nocturia  Muscle:  No pain, weakness  Neuro:  No weakness, tingling, memory problems  Psych:  No fatigue, insomnia, mood problems, depression  Endocrine:  No polyuria, polydypsia, cold/heat intolerance  Heme:  No petechiae, ecchymosis, easy bruisability  Skin:  No rash, tattoos, scars, edema      Vital Signs Last 24 Hrs  T(C): 36.7 (15 Mar 2019 08:09), Max: 36.8 (14 Mar 2019 17:22)  T(F): 98.1 (15 Mar 2019 08:09), Max: 98.3 (14 Mar 2019 17:22)  HR: 76 (15 Mar 2019 08:09) (73 - 84)  BP: 167/85 (15 Mar 2019 08:09) (148/84 - 167/85)  BP(mean): --  RR: 18 (15 Mar 2019 08:09) (18 - 18)  SpO2: 97% (15 Mar 2019 08:09) (97% - 98%)    PHYSICAL EXAM:    Constitutional: NAD, well-developed  HEENT: EOMI, throat clear  Neck: No LAD, supple  Respiratory: CTA and P  Cardiovascular: S1 and S2, RRR, no M  Gastrointestinal: BS+, soft, NT/ND, neg HSM,  Extremities: No peripheral edema, neg clubing, cyanosis  Vascular: 2+ peripheral pulses  Neurological: A/O x 3, no focal deficits  Psychiatric: Normal mood, normal affect  Skin: No rashes      LABS:    03-15    141  |  106  |  26<H>  ----------------------------<  57<L>  4.1   |  22  |  2.10<H>    Ca    9.0      15 Mar 2019 05:31            RADIOLOGY & ADDITIONAL TESTS:

## 2019-03-15 NOTE — PROGRESS NOTE ADULT - SUBJECTIVE AND OBJECTIVE BOX
CC: Patient is a 63y old  Male who presents with a chief complaint of fever (15 Mar 2019 09:18)    ID following for bacteremia, pyelonephritis    Interval History/ROS: Patient feels well. Has no complaints. Denies fever, chills.    Rest of ROS negative.    Allergies  No Known Drug Allergies  Seafood (Pruritus; Rash)    ANTIMICROBIALS:      OTHER MEDS:  acetaminophen   Tablet .. 650 milliGRAM(s) Oral every 6 hours PRN  aspirin enteric coated 81 milliGRAM(s) Oral daily  atorvastatin 20 milliGRAM(s) Oral at bedtime  calcium carbonate   1250 mG (OsCal) 1 Tablet(s) Oral two times a day  dextrose 40% Gel 15 Gram(s) Oral once PRN  dextrose 5%. 1000 milliLiter(s) IV Continuous <Continuous>  dextrose 50% Injectable 12.5 Gram(s) IV Push once  dextrose 50% Injectable 25 Gram(s) IV Push once  dextrose 50% Injectable 25 Gram(s) IV Push once  glucagon  Injectable 1 milliGRAM(s) IntraMuscular once PRN  heparin  Injectable 5000 Unit(s) SubCutaneous every 8 hours  insulin glargine Injectable (LANTUS) 30 Unit(s) SubCutaneous at bedtime  insulin lispro (HumaLOG) corrective regimen sliding scale   SubCutaneous three times a day before meals  insulin lispro Injectable (HumaLOG) 6 Unit(s) SubCutaneous three times a day before meals  labetalol 200 milliGRAM(s) Oral three times a day  NIFEdipine XL 60 milliGRAM(s) Oral daily  ondansetron Injectable 4 milliGRAM(s) IV Push every 6 hours PRN  predniSONE   Tablet 5 milliGRAM(s) Oral daily  tacrolimus 4 milliGRAM(s) Oral every 12 hours  tamsulosin 0.4 milliGRAM(s) Oral at bedtime    PE:    Vital Signs Last 24 Hrs  T(C): 36.7 (15 Mar 2019 08:09), Max: 36.8 (14 Mar 2019 17:22)  T(F): 98.1 (15 Mar 2019 08:09), Max: 98.3 (14 Mar 2019 17:22)  HR: 76 (15 Mar 2019 08:09) (73 - 84)  BP: 167/85 (15 Mar 2019 08:09) (148/84 - 167/85)  BP(mean): --  RR: 18 (15 Mar 2019 08:09) (18 - 18)  SpO2: 97% (15 Mar 2019 08:09) (97% - 98%)    Gen: AOx3, NAD, non-toxic  CV: S1+S2 normal, no murmurs  Resp: Clear bilat, no resp distress  Abd: Soft, nontender, +BS  Ext: RUE fistula intact  : No Coker  IV/Skin: No thrombophlebitis  Neuro: no focal deficits    LABS:        03-15    141  |  106  |  26<H>  ----------------------------<  57<L>  4.1   |  22  |  2.10<H>    Ca    9.0      15 Mar 2019 05:31    MICROBIOLOGY:  Vancomycin Level, Random: 16.6 ug/mL (03-15-19 @ 05:31)  v  .Blood Blood-Peripheral  03-12-19   No growth to date.  --  --      .Urine Clean Catch (Midstream)  03-08-19   >100,000 CFU/ml Coag Negative Staphylococcus  "Susceptibilities not performed"  --  --    .Blood Blood  03-08-19   Growth in aerobic and anaerobic bottles: Staphylococcus epidermidis  --  Blood Culture PCR  Coag Negative Staphylococcus    RADIOLOGY:    < from: CT Abdomen and Pelvis No Cont (03.08.19 @ 20:05) >  IMPRESSION:     Right pelvic transplant kidney with nonspecific surrounding fat   stranding.No hydronephrosis. In the setting of fever, consider   pyelonephritis. Correlation with urinalysis is recommended.    No appendicitis, colitis, or bowel obstruction.      < end of copied text >

## 2019-03-15 NOTE — DISCHARGE NOTE PROVIDER - HOSPITAL COURSE
63 M with DM and ESRD s/p renal transplant 2011 on Cellcept, Tacrolimus and Prednisone, admitted 3/7/19 with acute fevers. Associated vomiting and one episode diarrhea. Daughter-in-law with brief febrile illness one week prior.     Here 101.6F. Nontoxic but leukocytosis to 14 and worsening renal function    negative  UA, CXR, RVP.     abd/pelv CT: renal transplant immunocompromised pt with sepsis, fever, leukocytosis, BENEDICTO and transplant  fat stranding concerning for pyelonephritis    Blood culture: high grade coag neg staph bacteremia and ?UTI and transplant pyelonephritis         problem: severe sepsis secondary to bacteremia and pyelonephritis  with metabolic encephalopathy POA now improved   Plan: cw current antibiotics     fu cultures    ID following    will monitor.         Problem: chest pain. Plan    likely due to nausea and esophagitis     no biomarker evidence of acute MI     GI eval noted    echo Conclusions:    1. Hyperdynamic left ventricular systolic function.    2. The right ventricle is not well visualized; grossly    normal right ventricular systolic function.    Unable to rule out endocarditis, consider JAMMIE if clinically    indicated.        Problem: BENEDICTO (acute kidney injury).  Plan: monitor cr    renal fu     sp renal transplant    cw immunosuppressant.         Problem: Type 2 diabetes mellitus.  Plan: uncontrolled     monitor FS    Basal/bolus.     endo fu 63 M with DM and ESRD s/p renal transplant 2011 on Cellcept, Tacrolimus and Prednisone, admitted 3/7/19 with acute fevers. Associated vomiting and one episode diarrhea. Daughter-in-law with brief febrile illness one week prior.     Here 101.6F. Nontoxic but leukocytosis to 14 and worsening renal function    negative  UA, CXR, RVP.     abd/pelv CT: renal transplant immunocompromised pt with sepsis, fever, leukocytosis, BENEDICTO and transplant  fat stranding concerning for pyelonephritis    Blood culture: high grade coag neg staph bacteremia and ?UTI and transplant pyelonephritis         problem: severe sepsis secondary to bacteremia and pyelonephritis  with metabolic encephalopathy POA now improved   Plan: cw current antibiotics     fu cultures    ID following-Dr Andrea Dias.    will monitor.         Problem: chest pain. Plan    likely due to nausea and esophagitis     no biomarker evidence of acute MI     GI eval noted    echo Conclusions:    1. Hyperdynamic left ventricular systolic function.    2. The right ventricle is not well visualized; grossly    normal right ventricular systolic function.    Unable to rule out endocarditis, consider JAMMIE if clinically    indicated.        Problem: BENEDICTO (acute kidney injury).  Plan: monitor cr    renal fu     sp renal transplant    cw immunosuppressant.         Problem: Type 2 diabetes mellitus.  Plan: uncontrolled     monitor FS    Basal/bolus.     endo fu         D/C with F/u with ID clinic in 2-3 weeks. Continue Vanco 1250mg IV Q24hrs thru 3/21. F/U PMD Dr Perla within 1 week. F/U with Nephrology Dr Jeremi Hodges within 1-2 weeks

## 2019-03-15 NOTE — PROGRESS NOTE ADULT - SUBJECTIVE AND OBJECTIVE BOX
Patient is a 63y old  Male who presents with a chief complaint of fever (15 Mar 2019 18:24)      INTERVAL HPI/OVERNIGHT EVENTS:  T(C): 36.7 (03-15-19 @ 17:14), Max: 36.7 (03-14-19 @ 21:14)  HR: 72 (03-15-19 @ 17:14) (72 - 84)  BP: 185/90 (03-15-19 @ 17:14) (158/93 - 185/90)  RR: 18 (03-15-19 @ 17:14) (18 - 18)  SpO2: 99% (03-15-19 @ 17:14) (97% - 99%)  Wt(kg): --  I&O's Summary    14 Mar 2019 07:01  -  15 Mar 2019 07:00  --------------------------------------------------------  IN: 250 mL / OUT: 0 mL / NET: 250 mL    15 Mar 2019 07:01  -  15 Mar 2019 18:38  --------------------------------------------------------  IN: 540 mL / OUT: 0 mL / NET: 540 mL        LABS:    03-15    141  |  106  |  26<H>  ----------------------------<  57<L>  4.1   |  22  |  2.10<H>    Ca    9.0      15 Mar 2019 05:31          CAPILLARY BLOOD GLUCOSE      POCT Blood Glucose.: 143 mg/dL (15 Mar 2019 17:04)  POCT Blood Glucose.: 207 mg/dL (15 Mar 2019 12:17)  POCT Blood Glucose.: 116 mg/dL (15 Mar 2019 08:25)  POCT Blood Glucose.: 148 mg/dL (14 Mar 2019 21:11)            MEDICATIONS  (STANDING):  aspirin enteric coated 81 milliGRAM(s) Oral daily  atorvastatin 20 milliGRAM(s) Oral at bedtime  calcium carbonate   1250 mG (OsCal) 1 Tablet(s) Oral two times a day  chlorhexidine 4% Liquid 1 Application(s) Topical <User Schedule>  dextrose 5%. 1000 milliLiter(s) (50 mL/Hr) IV Continuous <Continuous>  dextrose 50% Injectable 12.5 Gram(s) IV Push once  dextrose 50% Injectable 25 Gram(s) IV Push once  dextrose 50% Injectable 25 Gram(s) IV Push once  heparin  Injectable 5000 Unit(s) SubCutaneous every 8 hours  insulin glargine Injectable (LANTUS) 30 Unit(s) SubCutaneous at bedtime  insulin lispro (HumaLOG) corrective regimen sliding scale   SubCutaneous three times a day before meals  insulin lispro Injectable (HumaLOG) 6 Unit(s) SubCutaneous three times a day before meals  labetalol 200 milliGRAM(s) Oral three times a day  NIFEdipine XL 60 milliGRAM(s) Oral daily  predniSONE   Tablet 5 milliGRAM(s) Oral daily  tacrolimus 4 milliGRAM(s) Oral every 12 hours  tamsulosin 0.4 milliGRAM(s) Oral at bedtime    MEDICATIONS  (PRN):  acetaminophen   Tablet .. 650 milliGRAM(s) Oral every 6 hours PRN Temp greater or equal to 38C (100.4F)  dextrose 40% Gel 15 Gram(s) Oral once PRN Blood Glucose LESS THAN 70 milliGRAM(s)/deciliter  glucagon  Injectable 1 milliGRAM(s) IntraMuscular once PRN Glucose LESS THAN 70 milligrams/deciliter  ondansetron Injectable 4 milliGRAM(s) IV Push every 6 hours PRN Nausea          PHYSICAL EXAM:  GENERAL: NAD, well-groomed, well-developed  HEAD:  Atraumatic, Normocephalic  CHEST/LUNG: Clear to percussion bilaterally; No rales, rhonchi, wheezing, or rubs  HEART: Regular rate and rhythm; No murmurs, rubs, or gallops  ABDOMEN: Soft, Nontender, Nondistended; Bowel sounds present  EXTREMITIES:  2+ Peripheral Pulses, No clubbing, cyanosis, or edema  LYMPH: No lymphadenopathy noted  SKIN: No rashes or lesions    Care Discussed with Consultants/Other Providers [ ] YES  [ ] NO

## 2019-03-15 NOTE — PROGRESS NOTE ADULT - ASSESSMENT
63 M with DM and ESRD s/p renal transplant 2011 on Cellcept, Tacrolimus and Prednisone, admitted 3/7/19 with acute fevers. Associated vomiting and one episode diarrhea. Daughter-in-law with brief febrile illness one week prior.   Here 101.6F. Nontoxic but leukocytosis to 14 and worsening renal function  negative  UA, CXR, RVP.   abd/pelvis CT: non specific fat stranding surrounding the transplant kidney  blood cx: high grade coag neg staph, urine also coag neg staph    renal transplant immunocompromised pt with sepsis, fever, leukocytosis, BENEDICTO and transplant  fat stranding concerning for pyelonephritis  high grade coag neg staph bacteremia and ?UTI and transplant pyelonephritis     Recommend:  -Redose vancomycin today  -Continue to dose by level  -Anticipate a 2-week course of vancomycin IV to complete 3/21/19  -Discussed with PARIS Roach to place left arm midline for IV vancomycin  -Discussed with [place] care and OK to give vancomycin IV through midline for 1 week. 63 M with DM and ESRD s/p renal transplant 2011 on Cellcept, Tacrolimus and Prednisone, admitted 3/7/19 with acute fevers. Associated vomiting and one episode diarrhea. Daughter-in-law with brief febrile illness one week prior.   Here 101.6F. Nontoxic but leukocytosis to 14 and worsening renal function  negative  UA, CXR, RVP.   abd/pelvis CT: non specific fat stranding surrounding the transplant kidney  blood cx: high grade coag neg staph, urine also coag neg staph    renal transplant immunocompromised pt with sepsis, fever, leukocytosis, BENEDICTO and transplant  fat stranding concerning for pyelonephritis  high grade coag neg staph bacteremia and ?UTI and transplant pyelonephritis     Recommend:  -Redose vancomycin today  -Continue to dose by level  -Anticipate a 2-week course of vancomycin IV to complete 3/21/19  -Discussed with Nephro, OK to place left arm midline for IV vancomycin  -Discussed with [place] care and OK to give vancomycin IV through midline for 1 week.    Addendum: Discussed with IR team and would prefer to place Santana catheter for vancomycin administration IV. Also, midline catheter with possibility of thrombosing the left arm.  Agree with Santana catheter placement.

## 2019-03-15 NOTE — PROGRESS NOTE ADULT - ASSESSMENT
sepsis  anbx   f/u with ID   echo unremarkable     HTN  cont current meds   will defer to renal for management     CKD  s/p transplant  fu with renal

## 2019-03-15 NOTE — PROGRESS NOTE ADULT - SUBJECTIVE AND OBJECTIVE BOX
Subjective: Patient seen and examined. No new events except as noted.     SUBJECTIVE/ROS:  No chest pain, dyspnea, palpitation, or dizziness.       MEDICATIONS:  MEDICATIONS  (STANDING):  aspirin enteric coated 81 milliGRAM(s) Oral daily  atorvastatin 20 milliGRAM(s) Oral at bedtime  calcium carbonate   1250 mG (OsCal) 1 Tablet(s) Oral two times a day  dextrose 5%. 1000 milliLiter(s) (50 mL/Hr) IV Continuous <Continuous>  dextrose 50% Injectable 12.5 Gram(s) IV Push once  dextrose 50% Injectable 25 Gram(s) IV Push once  dextrose 50% Injectable 25 Gram(s) IV Push once  heparin  Injectable 5000 Unit(s) SubCutaneous every 8 hours  insulin glargine Injectable (LANTUS) 30 Unit(s) SubCutaneous at bedtime  insulin lispro (HumaLOG) corrective regimen sliding scale   SubCutaneous three times a day before meals  insulin lispro Injectable (HumaLOG) 6 Unit(s) SubCutaneous three times a day before meals  labetalol 200 milliGRAM(s) Oral three times a day  NIFEdipine XL 60 milliGRAM(s) Oral daily  predniSONE   Tablet 5 milliGRAM(s) Oral daily  tacrolimus 4 milliGRAM(s) Oral every 12 hours  tamsulosin 0.4 milliGRAM(s) Oral at bedtime  vancomycin  IVPB 1250 milliGRAM(s) IV Intermittent once      PHYSICAL EXAM:  T(C): 36.7 (03-15-19 @ 08:09), Max: 36.8 (03-14-19 @ 17:22)  HR: 76 (03-15-19 @ 08:09) (73 - 84)  BP: 167/85 (03-15-19 @ 08:09) (148/84 - 167/85)  RR: 18 (03-15-19 @ 08:09) (18 - 18)  SpO2: 97% (03-15-19 @ 08:09) (97% - 98%)  Wt(kg): --  I&O's Summary    14 Mar 2019 07:01  -  15 Mar 2019 07:00  --------------------------------------------------------  IN: 250 mL / OUT: 0 mL / NET: 250 mL    15 Mar 2019 07:01  -  15 Mar 2019 14:28  --------------------------------------------------------  IN: 540 mL / OUT: 0 mL / NET: 540 mL            JVP: Normal  Neck: supple  Lung: clear   CV: S1 S2 , Murmur:  Abd: soft  Ext: No edema  neuro: Awake / alert  Psych: flat affect  Skin: normal``    LABS/DATA:    CARDIAC MARKERS:            03-15    141  |  106  |  26<H>  ----------------------------<  57<L>  4.1   |  22  |  2.10<H>    Ca    9.0      15 Mar 2019 05:31      proBNP:   Lipid Profile:   HgA1c:   TSH:     TELE:  EKG:

## 2019-03-15 NOTE — PROGRESS NOTE ADULT - SUBJECTIVE AND OBJECTIVE BOX
[facility] Division of Kidney Diseases & Hypertension  FOLLOW UP NOTE  [phone]--------------------------------------------------------------------------------  Chief Complaint:Fever      24 hour events/subjective:        PAST HISTORY  --------------------------------------------------------------------------------  No significant changes to PMH, PSH, FHx, SHx, unless otherwise noted    ALLERGIES & MEDICATIONS  --------------------------------------------------------------------------------  Allergies    No Known Drug Allergies  Seafood (Pruritus; Rash)    Intolerances      Standing Inpatient Medications  aspirin enteric coated 81 milliGRAM(s) Oral daily  atorvastatin 20 milliGRAM(s) Oral at bedtime  calcium carbonate   1250 mG (OsCal) 1 Tablet(s) Oral two times a day  dextrose 5%. 1000 milliLiter(s) IV Continuous <Continuous>  dextrose 50% Injectable 12.5 Gram(s) IV Push once  dextrose 50% Injectable 25 Gram(s) IV Push once  dextrose 50% Injectable 25 Gram(s) IV Push once  heparin  Injectable 5000 Unit(s) SubCutaneous every 8 hours  insulin glargine Injectable (LANTUS) 30 Unit(s) SubCutaneous at bedtime  insulin lispro (HumaLOG) corrective regimen sliding scale   SubCutaneous three times a day before meals  insulin lispro Injectable (HumaLOG) 6 Unit(s) SubCutaneous three times a day before meals  labetalol 200 milliGRAM(s) Oral three times a day  NIFEdipine XL 60 milliGRAM(s) Oral daily  predniSONE   Tablet 5 milliGRAM(s) Oral daily  tacrolimus 4 milliGRAM(s) Oral every 12 hours  tamsulosin 0.4 milliGRAM(s) Oral at bedtime  vancomycin  IVPB 1250 milliGRAM(s) IV Intermittent once    PRN Inpatient Medications  acetaminophen   Tablet .. 650 milliGRAM(s) Oral every 6 hours PRN  dextrose 40% Gel 15 Gram(s) Oral once PRN  glucagon  Injectable 1 milliGRAM(s) IntraMuscular once PRN  ondansetron Injectable 4 milliGRAM(s) IV Push every 6 hours PRN      REVIEW OF SYSTEMS  --------------------------------------------------------------------------------  Gen: No  fevers/chills  Skin: No rashes  Head/Eyes/Ears/Mouth: No headache; Normal hearing; Normal vision w/o blurriness  Respiratory: No dyspnea, cough, wheezing, hemoptysis  CV: No chest pain, PND, orthopnea  GI: No abdominal pain, diarrhea, constipation, nausea, vomiting  : No increased frequency, dysuria, hematuria, nocturia  MSK: No joint pain/swelling; no back pain; no edema  Neuro: No dizziness/lightheadedness, weakness, seizures, numbness, tingling      All other systems were reviewed and are negative, except as noted.    VITALS/PHYSICAL EXAM  --------------------------------------------------------------------------------  T(C): 36.7 (03-15-19 @ 08:09), Max: 36.8 (03-14-19 @ 17:22)  HR: 76 (03-15-19 @ 08:09) (73 - 84)  BP: 167/85 (03-15-19 @ 08:09) (148/84 - 167/85)  RR: 18 (03-15-19 @ 08:09) (18 - 18)  SpO2: 97% (03-15-19 @ 08:09) (97% - 98%)  Wt(kg): --        03-14-19 @ 07:01  -  03-15-19 @ 07:00  --------------------------------------------------------  IN: 250 mL / OUT: 0 mL / NET: 250 mL    03-15-19 @ 07:01  -  03-15-19 @ 13:32  --------------------------------------------------------  IN: 240 mL / OUT: 0 mL / NET: 240 mL      Physical Exam:  	Gen: NAD, well-appearing  	HEENT: PERRL, supple neck, clear oropharynx  	Pulm: CTA B/L  	CV: RRR, S1S2;  	Back: No spinal or CVA tenderness  	Abd: +BS, soft, nontender/nondistended  	: No suprapubic tenderness                      Extremities: no bilateral LE edema noted.                       Neuro: No focal deficits, intact gait  	Skin: Warm, without rashes  	Vascular access:    LABS/STUDIES  --------------------------------------------------------------------------------    141  |  106  |  26  ----------------------------<  57      [03-15-19 @ 05:31]  4.1   |  22  |  2.10        Ca     9.0     [03-15-19 @ 05:31]            Creatinine Trend:  SCr 2.10 [03-15 @ 05:31]  SCr 2.03 [03-13 @ 07:02]  SCr 2.25 [03-12 @ 07:34]  SCr 2.45 [03-11 @ 07:44]  SCr 2.34 [03-09 @ 07:54]    Urinalysis - [03-08-19 @ 16:01]      Color Light Yellow / Appearance Clear / SG 1.016 / pH 6.5      Gluc 1000 mg/dL / Ketone Small  / Bili Negative / Urobili Negative       Blood Small / Protein 30 mg/dL / Leuk Est Negative / Nitrite Negative      RBC 1 / WBC 3 / Hyaline 0 / Gran  / Sq Epi  / Non Sq Epi 1 / Bacteria Negative      HbA1c 9.2      [03-09-19 @ 10:23]    HCV 0.06, Nonreact      [03-09-19 @ 14:44] [facility] Division of Kidney Diseases & Hypertension  FOLLOW UP NOTE  [phone]--------------------------------------------------------------------------------  Chief Complaint: BENEDICTO on CKD; history of renal transplant     24 hour events/subjective:    Patient seen and examined.  Denies c/o SOB, CP, abdominal pain   Has good PO intake    PAST HISTORY  --------------------------------------------------------------------------------  No significant changes to PMH, PSH, FHx, SHx, unless otherwise noted    ALLERGIES & MEDICATIONS  --------------------------------------------------------------------------------  Allergies    No Known Drug Allergies  Seafood (Pruritus; Rash)    Intolerances      Standing Inpatient Medications  aspirin enteric coated 81 milliGRAM(s) Oral daily  atorvastatin 20 milliGRAM(s) Oral at bedtime  calcium carbonate   1250 mG (OsCal) 1 Tablet(s) Oral two times a day  dextrose 5%. 1000 milliLiter(s) IV Continuous <Continuous>  dextrose 50% Injectable 12.5 Gram(s) IV Push once  dextrose 50% Injectable 25 Gram(s) IV Push once  dextrose 50% Injectable 25 Gram(s) IV Push once  heparin  Injectable 5000 Unit(s) SubCutaneous every 8 hours  insulin glargine Injectable (LANTUS) 30 Unit(s) SubCutaneous at bedtime  insulin lispro (HumaLOG) corrective regimen sliding scale   SubCutaneous three times a day before meals  insulin lispro Injectable (HumaLOG) 6 Unit(s) SubCutaneous three times a day before meals  labetalol 200 milliGRAM(s) Oral three times a day  NIFEdipine XL 60 milliGRAM(s) Oral daily  predniSONE   Tablet 5 milliGRAM(s) Oral daily  tacrolimus 4 milliGRAM(s) Oral every 12 hours  tamsulosin 0.4 milliGRAM(s) Oral at bedtime  vancomycin  IVPB 1250 milliGRAM(s) IV Intermittent once    PRN Inpatient Medications  acetaminophen   Tablet .. 650 milliGRAM(s) Oral every 6 hours PRN  dextrose 40% Gel 15 Gram(s) Oral once PRN  glucagon  Injectable 1 milliGRAM(s) IntraMuscular once PRN  ondansetron Injectable 4 milliGRAM(s) IV Push every 6 hours PRN      REVIEW OF SYSTEMS  --------------------------------------------------------------------------------  Gen: No  fevers/chills  Skin: No rashes  Head/Eyes/Ears/Mouth: No headache; Normal hearing; Normal vision w/o blurriness  Respiratory: No dyspnea, cough, wheezing, hemoptysis  CV: No chest pain, PND, orthopnea  GI: No abdominal pain, diarrhea, constipation, nausea, vomiting  : No increased frequency, dysuria, hematuria, nocturia  MSK: No joint pain/swelling; no back pain; no edema  Neuro: No dizziness/lightheadedness, weakness, seizures, numbness, tingling      All other systems were reviewed and are negative, except as noted.    VITALS/PHYSICAL EXAM  --------------------------------------------------------------------------------  T(C): 36.7 (03-15-19 @ 08:09), Max: 36.8 (03-14-19 @ 17:22)  HR: 76 (03-15-19 @ 08:09) (73 - 84)  BP: 167/85 (03-15-19 @ 08:09) (148/84 - 167/85)  RR: 18 (03-15-19 @ 08:09) (18 - 18)  SpO2: 97% (03-15-19 @ 08:09) (97% - 98%)  Wt(kg): --        03-14-19 @ 07:01  -  03-15-19 @ 07:00  --------------------------------------------------------  IN: 250 mL / OUT: 0 mL / NET: 250 mL    03-15-19 @ 07:01  -  03-15-19 @ 13:32  --------------------------------------------------------  IN: 240 mL / OUT: 0 mL / NET: 240 mL      Physical Exam:  	  Gen: NAD  	HEENT: sclera anicteric   	Pulm: CTA B/L  	CV: RRR, S1S2;  	Abd: +BS, soft, nontender/nondistended              Extremities: no bilateral LE edema noted.               Neuro: No focal deficits  	Skin: Warm and dry              Vascular access: Right UE thrombosed AVF present.     LABS/STUDIES  --------------------------------------------------------------------------------    141  |  106  |  26  ----------------------------<  57      [03-15-19 @ 05:31]  4.1   |  22  |  2.10        Ca     9.0     [03-15-19 @ 05:31]        Creatinine Trend:  SCr 2.10 [03-15 @ 05:31]  SCr 2.03 [03-13 @ 07:02]  SCr 2.25 [03-12 @ 07:34]  SCr 2.45 [03-11 @ 07:44]  SCr 2.34 [03-09 @ 07:54]    Urinalysis - [03-08-19 @ 16:01]      Color Light Yellow / Appearance Clear / SG 1.016 / pH 6.5      Gluc 1000 mg/dL / Ketone Small  / Bili Negative / Urobili Negative       Blood Small / Protein 30 mg/dL / Leuk Est Negative / Nitrite Negative      RBC 1 / WBC 3 / Hyaline 0 / Gran  / Sq Epi  / Non Sq Epi 1 / Bacteria Negative      HbA1c 9.2      [03-09-19 @ 10:23]    HCV 0.06, Nonreact      [03-09-19 @ 14:44]

## 2019-03-15 NOTE — DISCHARGE NOTE PROVIDER - PROVIDER TOKENS
PROVIDER:[TOKEN:[[id number]],FOLLOWUP:[2 weeks]],PROVIDER:[TOKEN:[[id number]],FOLLOWUP:[2 weeks]] PROVIDER:[TOKEN:[[id number]],FOLLOWUP:[2 weeks]],PROVIDER:[TOKEN:[[id number]],FOLLOWUP:[2 weeks]],PROVIDER:[TOKEN:[[id number]],FOLLOWUP:[1 week]]

## 2019-03-15 NOTE — PROGRESS NOTE ADULT - ASSESSMENT
Problem: Sepsis  Assessment and Plan: high grade coag neg staph bacteremia and ?UTI and transplant pyelonephritis   - care per ID appreciated, follow their ongoing recs  - f/u echo    Problem: Pancreatitis, chronic  Assessment and Plan: pt has hx of ETOH pancreatitis  - asymptomatic at this point  - no further work up at this time

## 2019-03-15 NOTE — DISCHARGE NOTE PROVIDER - CARE PROVIDER_API CALL
Andrea Dias (MD)  Internal Medicine  [address] [place] [address]  Phone: [phone]  Fax: [phone]  Follow Up Time: 2 weeks    Jeremi Hodges (DO)  Nephrology  [address] [place] [address]  Phone: [phone]  Fax: [phone]  Follow Up Time: 2 weeks Andrea Dias (MD)  Internal Medicine  [address] [place] [address]  Phone: [phone]  Fax: [phone]  Follow Up Time: 2 weeks    Jeremi Hodges (DO)  Nephrology  [address] [place] [address]  Phone: [phone]  Fax: [phone]  Follow Up Time: 2 weeks    Jeromy Perla)  Internal Medicine  [address] [place] [address]  Phone: [phone]  Fax: [phone]  Follow Up Time: 1 week

## 2019-03-15 NOTE — PROGRESS NOTE ADULT - SUBJECTIVE AND OBJECTIVE BOX
Chief complaint  Patient is a 63y old  Male who presents with a chief complaint of fever (15 Mar 2019 15:32)   Review of systems  Patient in bed, looks comfortable, no fever, no hypoglycemia.    Labs and Fingersticks  CAPILLARY BLOOD GLUCOSE      POCT Blood Glucose.: 143 mg/dL (15 Mar 2019 17:04)  POCT Blood Glucose.: 207 mg/dL (15 Mar 2019 12:17)  POCT Blood Glucose.: 116 mg/dL (15 Mar 2019 08:25)  POCT Blood Glucose.: 148 mg/dL (14 Mar 2019 21:11)      Anion Gap, Serum: 13 (03-15 @ 05:31)      Calcium, Total Serum: 9.0 (03-15 @ 05:31)          03-15    141  |  106  |  26<H>  ----------------------------<  57<L>  4.1   |  22  |  2.10<H>    Ca    9.0      15 Mar 2019 05:31      Medications  MEDICATIONS  (STANDING):  aspirin enteric coated 81 milliGRAM(s) Oral daily  atorvastatin 20 milliGRAM(s) Oral at bedtime  calcium carbonate   1250 mG (OsCal) 1 Tablet(s) Oral two times a day  chlorhexidine 4% Liquid 1 Application(s) Topical <User Schedule>  dextrose 5%. 1000 milliLiter(s) (50 mL/Hr) IV Continuous <Continuous>  dextrose 50% Injectable 12.5 Gram(s) IV Push once  dextrose 50% Injectable 25 Gram(s) IV Push once  dextrose 50% Injectable 25 Gram(s) IV Push once  heparin  Injectable 5000 Unit(s) SubCutaneous every 8 hours  insulin glargine Injectable (LANTUS) 30 Unit(s) SubCutaneous at bedtime  insulin lispro (HumaLOG) corrective regimen sliding scale   SubCutaneous three times a day before meals  insulin lispro Injectable (HumaLOG) 6 Unit(s) SubCutaneous three times a day before meals  labetalol 200 milliGRAM(s) Oral three times a day  NIFEdipine XL 60 milliGRAM(s) Oral daily  predniSONE   Tablet 5 milliGRAM(s) Oral daily  tacrolimus 4 milliGRAM(s) Oral every 12 hours  tamsulosin 0.4 milliGRAM(s) Oral at bedtime      Physical Exam  General: Patient comfortable in bed  Vital Signs Last 12 Hrs  T(F): 98.1 (03-15-19 @ 17:14), Max: 98.1 (03-15-19 @ 08:09)  HR: 72 (03-15-19 @ 17:14) (72 - 76)  BP: 185/90 (03-15-19 @ 17:14) (167/85 - 185/90)  BP(mean): --  RR: 18 (03-15-19 @ 17:14) (18 - 18)  SpO2: 99% (03-15-19 @ 17:14) (97% - 99%)  Neck: No palpable thyroid nodules.  CVS: S1S2, No murmurs  Respiratory: No wheezing, no crepitations  GI: Abdomen soft, bowel sounds positive  Musculoskeletal:  edema lower extremities.   Skin: No skin rashes, no ecchymosis    Diagnostics

## 2019-03-15 NOTE — PROGRESS NOTE ADULT - ASSESSMENT
Assessment  DMT2: 63y Male with DM T2 with hyperglycemia on basal bolus insulin at home, blood sugars improving, no hypoglycemic episode, eating meals, non compliant with low carb diet.  Fever: On medications, stable, monitored.  HTN: Controlled, no headaches, on medications.  ESRD: On hemodialysis, Monitor labs/BMP       Problem/Plan - 1:  ·  Problem: Type 2 diabetes mellitus.  Plan: Will continue current insulin regimen for now. Will continue monitoring FS, log, will Follow up.  Patient counseled for compliance with consistent low carb diet.      Problem/Plan - 2:  ·  Problem: Fever of unknown origin (FUO).  Plan: Continue treatment, primary team FU.      Problem/Plan - 3:  ·  Problem: Hypertension.  Plan: Continue medications, monitoring, primary team FU.      Problem/Plan - 4:  ·  Problem: ESRD (end stage renal disease).  Plan: On HD, continue as scheduled, renal team FU.

## 2019-03-15 NOTE — DISCHARGE NOTE PROVIDER - CARE PROVIDERS DIRECT ADDRESSES
,[email or web address]

## 2019-03-15 NOTE — PROCEDURE NOTE - NSPOSTPRCRAD_GEN_A_CORE
line adjusted to depth of insertion/no pneumothorax/post-procedure radiography performed/central line located in the superior vena cava

## 2019-03-15 NOTE — PROGRESS NOTE ADULT - ASSESSMENT
Problem/Plan - 1:  ·  Problem: severe sepsis secondary to bacteremia and pyonephritis  with metabolic encephalopathy POA now improved   Plan: cw current antibiotics   fu cultures  ID following  will monitor.   catheter today     Problem/Plan - 2:  ·  Problem: BENEDICTO (acute kidney injury).  Plan: monitor cr  renal fu   sp renal transplant  cw immunosuppressant.     Problem/Plan - 3:  ·  Problem: Type 2 diabetes mellitus.  Plan: uncontrolled   monitor FS  Basal/bolus.   endo fu

## 2019-03-15 NOTE — PROGRESS NOTE ADULT - ASSESSMENT
63 year old male with h/o DDRT in 2011, CKD (baseline sCr 1.6-1.8) found to have BENEDICTO on CKD in setting of vomiting, infection and ARB use.    1. BENEDICTO on CKD: Patient with BENEDICTO on CKD in setting of pyelonephritis and bacteremia. Baseline Scr ~1.6-1.8. Scr. was noted to be elevated at 2.13 on admission and had increased to 2.45 on 3/11/19. Latest Scr. has improved to 2.10 today. CT abdomen and pelvis done on 3/8 shows no hydronephrosis. Remains non oliguric. Continue with IV antibiotics. Monitor vancomycin levels. Continue to hold ARB. Monitor BMP daily. Avoid NSAIDs, RCAS, and other nephrotoxins.     2. Immunosuppression: Patient with h/o DDRT in 2011; was on tacrolimus 4/4,  mg BID, and prednisone 5 mg daily. Latest FK noted to be 7.9. Continue to hold MMF for now; can resume on discharge. Continue with rest of immunosuppressive medications. Monitor daily FK levels.

## 2019-03-16 ENCOUNTER — TRANSCRIPTION ENCOUNTER (OUTPATIENT)
Age: 64
End: 2019-03-16

## 2019-03-16 LAB
ANION GAP SERPL CALC-SCNC: 12 MMOL/L — SIGNIFICANT CHANGE UP (ref 5–17)
BUN SERPL-MCNC: 25 MG/DL — HIGH (ref 7–23)
CALCIUM SERPL-MCNC: 8.9 MG/DL — SIGNIFICANT CHANGE UP (ref 8.4–10.5)
CHLORIDE SERPL-SCNC: 107 MMOL/L — SIGNIFICANT CHANGE UP (ref 96–108)
CO2 SERPL-SCNC: 23 MMOL/L — SIGNIFICANT CHANGE UP (ref 22–31)
CREAT SERPL-MCNC: 1.98 MG/DL — HIGH (ref 0.5–1.3)
GLUCOSE BLDC GLUCOMTR-MCNC: 101 MG/DL — HIGH (ref 70–99)
GLUCOSE BLDC GLUCOMTR-MCNC: 170 MG/DL — HIGH (ref 70–99)
GLUCOSE BLDC GLUCOMTR-MCNC: 194 MG/DL — HIGH (ref 70–99)
GLUCOSE BLDC GLUCOMTR-MCNC: 83 MG/DL — SIGNIFICANT CHANGE UP (ref 70–99)
GLUCOSE BLDC GLUCOMTR-MCNC: 86 MG/DL — SIGNIFICANT CHANGE UP (ref 70–99)
GLUCOSE BLDC GLUCOMTR-MCNC: 94 MG/DL — SIGNIFICANT CHANGE UP (ref 70–99)
GLUCOSE SERPL-MCNC: 58 MG/DL — LOW (ref 70–99)
POTASSIUM SERPL-MCNC: 4.2 MMOL/L — SIGNIFICANT CHANGE UP (ref 3.5–5.3)
POTASSIUM SERPL-SCNC: 4.2 MMOL/L — SIGNIFICANT CHANGE UP (ref 3.5–5.3)
SODIUM SERPL-SCNC: 142 MMOL/L — SIGNIFICANT CHANGE UP (ref 135–145)
TACROLIMUS SERPL-MCNC: 6.1 NG/ML — SIGNIFICANT CHANGE UP
VANCOMYCIN FLD-MCNC: 16.2 UG/ML — SIGNIFICANT CHANGE UP

## 2019-03-16 RX ORDER — VANCOMYCIN HCL 1 G
1250 VIAL (EA) INTRAVENOUS ONCE
Qty: 0 | Refills: 0 | Status: COMPLETED | OUTPATIENT
Start: 2019-03-16 | End: 2019-03-16

## 2019-03-16 RX ORDER — INSULIN LISPRO 100/ML
5 VIAL (ML) SUBCUTANEOUS
Qty: 0 | Refills: 0 | Status: DISCONTINUED | OUTPATIENT
Start: 2019-03-16 | End: 2019-03-18

## 2019-03-16 RX ORDER — INSULIN GLARGINE 100 [IU]/ML
18 INJECTION, SOLUTION SUBCUTANEOUS AT BEDTIME
Qty: 0 | Refills: 0 | Status: DISCONTINUED | OUTPATIENT
Start: 2019-03-16 | End: 2019-03-17

## 2019-03-16 RX ORDER — VANCOMYCIN HCL 1 G
1.25 VIAL (EA) INTRAVENOUS
Qty: 6.25 | Refills: 0 | OUTPATIENT
Start: 2019-03-16 | End: 2019-03-20

## 2019-03-16 RX ADMIN — Medication 5 UNIT(S): at 17:30

## 2019-03-16 RX ADMIN — Medication 5 MILLIGRAM(S): at 05:30

## 2019-03-16 RX ADMIN — Medication 300 MILLIGRAM(S): at 21:42

## 2019-03-16 RX ADMIN — INSULIN GLARGINE 18 UNIT(S): 100 INJECTION, SOLUTION SUBCUTANEOUS at 22:16

## 2019-03-16 RX ADMIN — Medication 81 MILLIGRAM(S): at 12:18

## 2019-03-16 RX ADMIN — Medication 300 MILLIGRAM(S): at 14:32

## 2019-03-16 RX ADMIN — Medication 1 TABLET(S): at 17:31

## 2019-03-16 RX ADMIN — HEPARIN SODIUM 5000 UNIT(S): 5000 INJECTION INTRAVENOUS; SUBCUTANEOUS at 14:33

## 2019-03-16 RX ADMIN — Medication 166.67 MILLIGRAM(S): at 12:18

## 2019-03-16 RX ADMIN — TAMSULOSIN HYDROCHLORIDE 0.4 MILLIGRAM(S): 0.4 CAPSULE ORAL at 21:42

## 2019-03-16 RX ADMIN — Medication 5 UNIT(S): at 12:57

## 2019-03-16 RX ADMIN — Medication 1: at 12:57

## 2019-03-16 RX ADMIN — Medication 1 TABLET(S): at 05:30

## 2019-03-16 RX ADMIN — ATORVASTATIN CALCIUM 20 MILLIGRAM(S): 80 TABLET, FILM COATED ORAL at 21:42

## 2019-03-16 RX ADMIN — Medication 300 MILLIGRAM(S): at 05:30

## 2019-03-16 RX ADMIN — HEPARIN SODIUM 5000 UNIT(S): 5000 INJECTION INTRAVENOUS; SUBCUTANEOUS at 21:42

## 2019-03-16 RX ADMIN — HEPARIN SODIUM 5000 UNIT(S): 5000 INJECTION INTRAVENOUS; SUBCUTANEOUS at 05:30

## 2019-03-16 RX ADMIN — TACROLIMUS 3 MILLIGRAM(S): 5 CAPSULE ORAL at 05:30

## 2019-03-16 RX ADMIN — Medication 6 UNIT(S): at 08:45

## 2019-03-16 RX ADMIN — TACROLIMUS 3 MILLIGRAM(S): 5 CAPSULE ORAL at 17:31

## 2019-03-16 RX ADMIN — Medication 1: at 17:30

## 2019-03-16 RX ADMIN — CHLORHEXIDINE GLUCONATE 1 APPLICATION(S): 213 SOLUTION TOPICAL at 05:45

## 2019-03-16 RX ADMIN — Medication 60 MILLIGRAM(S): at 05:30

## 2019-03-16 NOTE — PROGRESS NOTE ADULT - SUBJECTIVE AND OBJECTIVE BOX
Chief complaint  Patient is a 63y old  Male who presents with a chief complaint of fever (16 Mar 2019 16:45)   Review of systems  Patient in bed, looks comfortable, no fever, no hypoglycemia.    Labs and Fingersticks  CAPILLARY BLOOD GLUCOSE      POCT Blood Glucose.: 194 mg/dL (16 Mar 2019 12:35)  POCT Blood Glucose.: 83 mg/dL (16 Mar 2019 08:25)  POCT Blood Glucose.: 93 mg/dL (15 Mar 2019 23:13)  POCT Blood Glucose.: 103 mg/dL (15 Mar 2019 22:45)  POCT Blood Glucose.: 89 mg/dL (15 Mar 2019 22:10)  POCT Blood Glucose.: 72 mg/dL (15 Mar 2019 21:46)  POCT Blood Glucose.: 143 mg/dL (15 Mar 2019 17:04)      Anion Gap, Serum: 12 (03-16 @ 07:04)  Anion Gap, Serum: 13 (03-15 @ 05:31)      Calcium, Total Serum: 8.9 (03-16 @ 07:04)  Calcium, Total Serum: 9.0 (03-15 @ 05:31)          03-16    142  |  107  |  25<H>  ----------------------------<  58<L>  4.2   |  23  |  1.98<H>    Ca    8.9      16 Mar 2019 07:04      Medications  MEDICATIONS  (STANDING):  aspirin enteric coated 81 milliGRAM(s) Oral daily  atorvastatin 20 milliGRAM(s) Oral at bedtime  calcium carbonate   1250 mG (OsCal) 1 Tablet(s) Oral two times a day  chlorhexidine 4% Liquid 1 Application(s) Topical <User Schedule>  dextrose 5%. 1000 milliLiter(s) (50 mL/Hr) IV Continuous <Continuous>  dextrose 50% Injectable 12.5 Gram(s) IV Push once  dextrose 50% Injectable 25 Gram(s) IV Push once  dextrose 50% Injectable 25 Gram(s) IV Push once  heparin  Injectable 5000 Unit(s) SubCutaneous every 8 hours  insulin glargine Injectable (LANTUS) 18 Unit(s) SubCutaneous at bedtime  insulin lispro (HumaLOG) corrective regimen sliding scale   SubCutaneous three times a day before meals  insulin lispro Injectable (HumaLOG) 5 Unit(s) SubCutaneous three times a day before meals  labetalol 300 milliGRAM(s) Oral three times a day  NIFEdipine XL 60 milliGRAM(s) Oral daily  predniSONE   Tablet 5 milliGRAM(s) Oral daily  tacrolimus 3 milliGRAM(s) Oral every 12 hours  tamsulosin 0.4 milliGRAM(s) Oral at bedtime      Physical Exam  General: Patient comfortable in bed  Vital Signs Last 12 Hrs  T(F): 98.5 (03-16-19 @ 15:47), Max: 98.5 (03-16-19 @ 15:47)  HR: 75 (03-16-19 @ 15:47) (72 - 75)  BP: 177/96 (03-16-19 @ 15:47) (143/84 - 177/96)  BP(mean): --  RR: 18 (03-16-19 @ 15:47) (18 - 18)  SpO2: 97% (03-16-19 @ 15:47) (97% - 100%)  Neck: No palpable thyroid nodules.  CVS: S1S2, No murmurs  Respiratory: No wheezing, no crepitations  GI: Abdomen soft, bowel sounds positive  Musculoskeletal:  edema lower extremities.   Skin: No skin rashes, no ecchymosis    Diagnostics

## 2019-03-16 NOTE — CHART NOTE - NSCHARTNOTEFT_GEN_A_CORE
OLGA ZEPEDA  63y Male    Notified by RN FS = 72. Seen and examined. Denies dizziness, CP, SOB, HA, weakness. Resting comfortably in bed.     Vital Signs Last 24 Hrs  T(C): 36.3 (15 Mar 2019 21:30), Max: 36.7 (15 Mar 2019 08:09)  T(F): 97.4 (15 Mar 2019 21:30), Max: 98.1 (15 Mar 2019 08:09)  HR: 81 (15 Mar 2019 21:30) (72 - 81)  BP: 161/96 (15 Mar 2019 21:30) (161/96 - 185/90)  BP(mean): --  RR: 18 (15 Mar 2019 21:30) (18 - 18)  SpO2: 96% (15 Mar 2019 21:30) (96% - 99%)      Hypoglycemia protocol followed - repeat FS = 89, 103, 93.   12U Lantus x1 ordered per Dr. Adarsh Santillan f/u in AM     will continue to monitor   will endorse to day team     Chelsea Acosta PA-C  [id number]

## 2019-03-16 NOTE — PROGRESS NOTE ADULT - SUBJECTIVE AND OBJECTIVE BOX
Subjective: Patient seen and examined. No new events except as noted.     SUBJECTIVE/ROS:        MEDICATIONS:  MEDICATIONS  (STANDING):  aspirin enteric coated 81 milliGRAM(s) Oral daily  atorvastatin 20 milliGRAM(s) Oral at bedtime  calcium carbonate   1250 mG (OsCal) 1 Tablet(s) Oral two times a day  chlorhexidine 4% Liquid 1 Application(s) Topical <User Schedule>  dextrose 5%. 1000 milliLiter(s) (50 mL/Hr) IV Continuous <Continuous>  dextrose 50% Injectable 12.5 Gram(s) IV Push once  dextrose 50% Injectable 25 Gram(s) IV Push once  dextrose 50% Injectable 25 Gram(s) IV Push once  heparin  Injectable 5000 Unit(s) SubCutaneous every 8 hours  insulin glargine Injectable (LANTUS) 18 Unit(s) SubCutaneous at bedtime  insulin lispro (HumaLOG) corrective regimen sliding scale   SubCutaneous three times a day before meals  insulin lispro Injectable (HumaLOG) 5 Unit(s) SubCutaneous three times a day before meals  labetalol 300 milliGRAM(s) Oral three times a day  NIFEdipine XL 60 milliGRAM(s) Oral daily  predniSONE   Tablet 5 milliGRAM(s) Oral daily  tacrolimus 3 milliGRAM(s) Oral every 12 hours  tamsulosin 0.4 milliGRAM(s) Oral at bedtime      PHYSICAL EXAM:  T(C): 36.7 (03-16-19 @ 07:37), Max: 36.8 (03-16-19 @ 05:12)  HR: 73 (03-16-19 @ 07:37) (72 - 81)  BP: 143/84 (03-16-19 @ 07:37) (143/84 - 185/90)  RR: 18 (03-16-19 @ 07:37) (18 - 18)  SpO2: 98% (03-16-19 @ 07:37) (96% - 100%)  Wt(kg): --  I&O's Summary    15 Mar 2019 07:01  -  16 Mar 2019 07:00  --------------------------------------------------------  IN: 540 mL / OUT: 400 mL / NET: 140 mL    16 Mar 2019 07:01  -  16 Mar 2019 13:45  --------------------------------------------------------  IN: 540 mL / OUT: 0 mL / NET: 540 mL            JVP: Normal  Neck: supple  Lung: clear   CV: S1 S2 , Murmur:  Abd: soft  Ext: No edema  neuro: Awake   Psych: flat affect  Skin: normal``    LABS/DATA:    CARDIAC MARKERS:            03-16    142  |  107  |  25<H>  ----------------------------<  58<L>  4.2   |  23  |  1.98<H>    Ca    8.9      16 Mar 2019 07:04      proBNP:   Lipid Profile:   HgA1c:   TSH:     TELE:  EKG:

## 2019-03-16 NOTE — PROGRESS NOTE ADULT - ASSESSMENT
Assessment  DMT2: 63y Male with DM T2 with hyperglycemia on basal bolus insulin at home, blood sugars trending down,, had hypoglycemic episode, eating meals, insulin dose adjusted today,  non compliant with low carb diet.  Fever: On medications, stable, monitored.  HTN: Controlled, no headaches, on medications.  ESRD: On hemodialysis, Monitor labs/BMP       Problem/Plan - 1:  ·  Problem: Type 2 diabetes mellitus.  Plan: Will continue current insulin regimen for now. Will continue monitoring FS, log, will Follow up.  Patient counseled for compliance with consistent low carb diet.      Problem/Plan - 2:  ·  Problem: Fever of unknown origin (FUO).  Plan: Continue treatment, primary team FU.      Problem/Plan - 3:  ·  Problem: Hypertension.  Plan: Continue medications, monitoring, primary team FU.      Problem/Plan - 4:  ·  Problem: ESRD (end stage renal disease).  Plan: On HD, continue as scheduled, renal team FU.

## 2019-03-16 NOTE — PROGRESS NOTE ADULT - SUBJECTIVE AND OBJECTIVE BOX
Patient is a 63y old  Male who presents with a chief complaint of fever (16 Mar 2019 16:45)      INTERVAL HPI/OVERNIGHT EVENTS:  T(C): 36.9 (03-16-19 @ 15:47), Max: 36.9 (03-16-19 @ 15:47)  HR: 75 (03-16-19 @ 15:47) (72 - 81)  BP: 177/96 (03-16-19 @ 15:47) (143/84 - 185/90)  RR: 18 (03-16-19 @ 15:47) (18 - 18)  SpO2: 97% (03-16-19 @ 15:47) (96% - 100%)  Wt(kg): --  I&O's Summary    15 Mar 2019 07:01  -  16 Mar 2019 07:00  --------------------------------------------------------  IN: 540 mL / OUT: 400 mL / NET: 140 mL    16 Mar 2019 07:01  -  16 Mar 2019 16:48  --------------------------------------------------------  IN: 540 mL / OUT: 750 mL / NET: -210 mL        LABS:    03-16    142  |  107  |  25<H>  ----------------------------<  58<L>  4.2   |  23  |  1.98<H>    Ca    8.9      16 Mar 2019 07:04          CAPILLARY BLOOD GLUCOSE      POCT Blood Glucose.: 194 mg/dL (16 Mar 2019 12:35)  POCT Blood Glucose.: 83 mg/dL (16 Mar 2019 08:25)  POCT Blood Glucose.: 93 mg/dL (15 Mar 2019 23:13)  POCT Blood Glucose.: 103 mg/dL (15 Mar 2019 22:45)  POCT Blood Glucose.: 89 mg/dL (15 Mar 2019 22:10)  POCT Blood Glucose.: 72 mg/dL (15 Mar 2019 21:46)  POCT Blood Glucose.: 143 mg/dL (15 Mar 2019 17:04)            MEDICATIONS  (STANDING):  aspirin enteric coated 81 milliGRAM(s) Oral daily  atorvastatin 20 milliGRAM(s) Oral at bedtime  calcium carbonate   1250 mG (OsCal) 1 Tablet(s) Oral two times a day  chlorhexidine 4% Liquid 1 Application(s) Topical <User Schedule>  dextrose 5%. 1000 milliLiter(s) (50 mL/Hr) IV Continuous <Continuous>  dextrose 50% Injectable 12.5 Gram(s) IV Push once  dextrose 50% Injectable 25 Gram(s) IV Push once  dextrose 50% Injectable 25 Gram(s) IV Push once  heparin  Injectable 5000 Unit(s) SubCutaneous every 8 hours  insulin glargine Injectable (LANTUS) 18 Unit(s) SubCutaneous at bedtime  insulin lispro (HumaLOG) corrective regimen sliding scale   SubCutaneous three times a day before meals  insulin lispro Injectable (HumaLOG) 5 Unit(s) SubCutaneous three times a day before meals  labetalol 300 milliGRAM(s) Oral three times a day  NIFEdipine XL 60 milliGRAM(s) Oral daily  predniSONE   Tablet 5 milliGRAM(s) Oral daily  tacrolimus 3 milliGRAM(s) Oral every 12 hours  tamsulosin 0.4 milliGRAM(s) Oral at bedtime    MEDICATIONS  (PRN):  acetaminophen   Tablet .. 650 milliGRAM(s) Oral every 6 hours PRN Temp greater or equal to 38C (100.4F)  dextrose 40% Gel 15 Gram(s) Oral once PRN Blood Glucose LESS THAN 70 milliGRAM(s)/deciliter  glucagon  Injectable 1 milliGRAM(s) IntraMuscular once PRN Glucose LESS THAN 70 milligrams/deciliter  ondansetron Injectable 4 milliGRAM(s) IV Push every 6 hours PRN Nausea          PHYSICAL EXAM:  GENERAL: NAD, well-groomed, well-developed  HEAD:  Atraumatic, Normocephalic  CHEST/LUNG: Clear to percussion bilaterally; No rales, rhonchi, wheezing, or rubs  HEART: Regular rate and rhythm; No murmurs, rubs, or gallops  ABDOMEN: Soft, Nontender, Nondistended; Bowel sounds present  EXTREMITIES:  2+ Peripheral Pulses, No clubbing, cyanosis, or edema  LYMPH: No lymphadenopathy noted  SKIN: No rashes or lesions    Care Discussed with Consultants/Other Providers [ ] YES  [ ] NO

## 2019-03-16 NOTE — PROGRESS NOTE ADULT - ASSESSMENT
Problem/Plan - 1:  ·  Problem: severe sepsis secondary to bacteremia and pyonephritis  with metabolic encephalopathy POA now improved   Plan: cw current antibiotics   fu cultures  ID following  will monitor.   sp Hickmans catheter    Problem/Plan - 2:  ·  Problem: BENEDICTO (acute kidney injury).  Plan: monitor cr  renal fu   sp renal transplant  cw immunosuppressant.     Problem/Plan - 3:  ·  Problem: Type 2 diabetes mellitus.  Plan: uncontrolled   monitor FS  Basal/bolus.   endo fu     dc planning pending iv antibiotics setup at home

## 2019-03-16 NOTE — PROGRESS NOTE ADULT - SUBJECTIVE AND OBJECTIVE BOX
INTERVAL HPI/OVERNIGHT EVENTS:  No new overnight event.  No N/V/D.  Tolerating diet.    Allergies    No Known Drug Allergies  Seafood (Pruritus; Rash)    Intolerances          General:  No wt loss, fevers, chills, night sweats, fatigue,   Eyes:  Good vision, no reported pain  ENT:  No sore throat, pain, runny nose, dysphagia  CV:  No pain, palpitations, hypo/hypertension  Resp:  No dyspnea, cough, tachypnea, wheezing  GI:  No pain, No nausea, No vomiting, No diarrhea, No constipation, No weight loss, No fever, No pruritis, No rectal bleeding, No tarry stools, No dysphagia,  :  No pain, bleeding, incontinence, nocturia  Muscle:  No pain, weakness  Neuro:  No weakness, tingling, memory problems  Psych:  No fatigue, insomnia, mood problems, depression  Endocrine:  No polyuria, polydipsia, cold/heat intolerance  Heme:  No petechiae, ecchymosis, easy bruisability  Skin:  No rash, tattoos, scars, edema      PHYSICAL EXAM:   Vital Signs:  Vital Signs Last 24 Hrs  T(C): 36.9 (16 Mar 2019 15:47), Max: 36.9 (16 Mar 2019 15:47)  T(F): 98.5 (16 Mar 2019 15:47), Max: 98.5 (16 Mar 2019 15:47)  HR: 75 (16 Mar 2019 15:47) (72 - 81)  BP: 177/96 (16 Mar 2019 15:47) (143/84 - 185/90)  BP(mean): --  RR: 18 (16 Mar 2019 15:47) (18 - 18)  SpO2: 97% (16 Mar 2019 15:47) (96% - 100%)  Daily     Daily I&O's Summary    15 Mar 2019 07:01  -  16 Mar 2019 07:00  --------------------------------------------------------  IN: 540 mL / OUT: 400 mL / NET: 140 mL    16 Mar 2019 07:01  -  16 Mar 2019 16:46  --------------------------------------------------------  IN: 540 mL / OUT: 750 mL / NET: -210 mL        GENERAL:  Appears stated age, well-groomed, well-nourished, no distress  HEENT:  NC/AT,  conjunctivae clear and pink, no thyromegaly, nodules, adenopathy, no JVD, sclera -anicteric  CHEST:  Full & symmetric excursion, no increased effort, breath sounds clear  HEART:  Regular rhythm, S1, S2, no murmur/rub/S3/S4, no abdominal bruit, no edema  ABDOMEN:  Soft, non-tender, non-distended, normoactive bowel sounds,  no masses ,no hepato-splenomegaly, no signs of chronic liver disease  EXTEREMITIES:  no cyanosis,clubbing or edema  SKIN:  No rash/erythema/ecchymoses/petechiae/wounds/abscess/warm/dry  NEURO:  Alert, oriented, no asterixis, no tremor, no encephalopathy      LABS:    03-16    142  |  107  |  25<H>  ----------------------------<  58<L>  4.2   |  23  |  1.98<H>    Ca    8.9      16 Mar 2019 07:04          amylase   lipase  RADIOLOGY & ADDITIONAL TESTS:

## 2019-03-17 LAB
ANION GAP SERPL CALC-SCNC: 12 MMOL/L — SIGNIFICANT CHANGE UP (ref 5–17)
BUN SERPL-MCNC: 20 MG/DL — SIGNIFICANT CHANGE UP (ref 7–23)
CALCIUM SERPL-MCNC: 8.6 MG/DL — SIGNIFICANT CHANGE UP (ref 8.4–10.5)
CHLORIDE SERPL-SCNC: 108 MMOL/L — SIGNIFICANT CHANGE UP (ref 96–108)
CO2 SERPL-SCNC: 22 MMOL/L — SIGNIFICANT CHANGE UP (ref 22–31)
CREAT SERPL-MCNC: 1.86 MG/DL — HIGH (ref 0.5–1.3)
CULTURE RESULTS: SIGNIFICANT CHANGE UP
CULTURE RESULTS: SIGNIFICANT CHANGE UP
GLUCOSE BLDC GLUCOMTR-MCNC: 100 MG/DL — HIGH (ref 70–99)
GLUCOSE BLDC GLUCOMTR-MCNC: 185 MG/DL — HIGH (ref 70–99)
GLUCOSE BLDC GLUCOMTR-MCNC: 271 MG/DL — HIGH (ref 70–99)
GLUCOSE BLDC GLUCOMTR-MCNC: 71 MG/DL — SIGNIFICANT CHANGE UP (ref 70–99)
GLUCOSE BLDC GLUCOMTR-MCNC: 87 MG/DL — SIGNIFICANT CHANGE UP (ref 70–99)
GLUCOSE SERPL-MCNC: 69 MG/DL — LOW (ref 70–99)
POTASSIUM SERPL-MCNC: 4.3 MMOL/L — SIGNIFICANT CHANGE UP (ref 3.5–5.3)
POTASSIUM SERPL-SCNC: 4.3 MMOL/L — SIGNIFICANT CHANGE UP (ref 3.5–5.3)
SODIUM SERPL-SCNC: 142 MMOL/L — SIGNIFICANT CHANGE UP (ref 135–145)
SPECIMEN SOURCE: SIGNIFICANT CHANGE UP
SPECIMEN SOURCE: SIGNIFICANT CHANGE UP
TACROLIMUS SERPL-MCNC: 12.4 NG/ML — SIGNIFICANT CHANGE UP
VANCOMYCIN FLD-MCNC: 16.2 UG/ML — SIGNIFICANT CHANGE UP

## 2019-03-17 PROCEDURE — 99232 SBSQ HOSP IP/OBS MODERATE 35: CPT

## 2019-03-17 RX ORDER — VANCOMYCIN HCL 1 G
1250 VIAL (EA) INTRAVENOUS ONCE
Qty: 0 | Refills: 0 | Status: COMPLETED | OUTPATIENT
Start: 2019-03-17 | End: 2019-03-17

## 2019-03-17 RX ORDER — INSULIN GLARGINE 100 [IU]/ML
10 INJECTION, SOLUTION SUBCUTANEOUS AT BEDTIME
Qty: 0 | Refills: 0 | Status: DISCONTINUED | OUTPATIENT
Start: 2019-03-17 | End: 2019-03-18

## 2019-03-17 RX ADMIN — Medication 300 MILLIGRAM(S): at 13:22

## 2019-03-17 RX ADMIN — Medication 1 TABLET(S): at 17:28

## 2019-03-17 RX ADMIN — Medication 60 MILLIGRAM(S): at 05:12

## 2019-03-17 RX ADMIN — INSULIN GLARGINE 10 UNIT(S): 100 INJECTION, SOLUTION SUBCUTANEOUS at 22:15

## 2019-03-17 RX ADMIN — Medication 5 UNIT(S): at 17:28

## 2019-03-17 RX ADMIN — Medication 5 MILLIGRAM(S): at 05:11

## 2019-03-17 RX ADMIN — Medication 300 MILLIGRAM(S): at 05:11

## 2019-03-17 RX ADMIN — Medication 81 MILLIGRAM(S): at 12:48

## 2019-03-17 RX ADMIN — Medication 166.67 MILLIGRAM(S): at 13:23

## 2019-03-17 RX ADMIN — Medication 1: at 17:28

## 2019-03-17 RX ADMIN — Medication 3: at 12:47

## 2019-03-17 RX ADMIN — HEPARIN SODIUM 5000 UNIT(S): 5000 INJECTION INTRAVENOUS; SUBCUTANEOUS at 21:34

## 2019-03-17 RX ADMIN — Medication 1 TABLET(S): at 05:11

## 2019-03-17 RX ADMIN — TAMSULOSIN HYDROCHLORIDE 0.4 MILLIGRAM(S): 0.4 CAPSULE ORAL at 21:35

## 2019-03-17 RX ADMIN — TACROLIMUS 3 MILLIGRAM(S): 5 CAPSULE ORAL at 05:11

## 2019-03-17 RX ADMIN — Medication 5 UNIT(S): at 08:49

## 2019-03-17 RX ADMIN — Medication 5 UNIT(S): at 12:47

## 2019-03-17 RX ADMIN — ATORVASTATIN CALCIUM 20 MILLIGRAM(S): 80 TABLET, FILM COATED ORAL at 21:35

## 2019-03-17 RX ADMIN — HEPARIN SODIUM 5000 UNIT(S): 5000 INJECTION INTRAVENOUS; SUBCUTANEOUS at 05:11

## 2019-03-17 RX ADMIN — HEPARIN SODIUM 5000 UNIT(S): 5000 INJECTION INTRAVENOUS; SUBCUTANEOUS at 13:22

## 2019-03-17 RX ADMIN — Medication 300 MILLIGRAM(S): at 21:35

## 2019-03-17 RX ADMIN — TACROLIMUS 3 MILLIGRAM(S): 5 CAPSULE ORAL at 17:28

## 2019-03-17 RX ADMIN — CHLORHEXIDINE GLUCONATE 1 APPLICATION(S): 213 SOLUTION TOPICAL at 05:11

## 2019-03-17 NOTE — PROGRESS NOTE ADULT - ASSESSMENT
sepsis  anbx   f/u with ID   echo unremarkable     HTN  cont current meds   will defer to renal for management     CKD  s/p transplant  fu with renal   crt stable     dc planning as per primary team

## 2019-03-17 NOTE — PROGRESS NOTE ADULT - SUBJECTIVE AND OBJECTIVE BOX
Subjective: Patient seen and examined. No new events except as noted.     SUBJECTIVE/ROS:        MEDICATIONS:  MEDICATIONS  (STANDING):  aspirin enteric coated 81 milliGRAM(s) Oral daily  atorvastatin 20 milliGRAM(s) Oral at bedtime  calcium carbonate   1250 mG (OsCal) 1 Tablet(s) Oral two times a day  chlorhexidine 4% Liquid 1 Application(s) Topical <User Schedule>  dextrose 5%. 1000 milliLiter(s) (50 mL/Hr) IV Continuous <Continuous>  dextrose 50% Injectable 12.5 Gram(s) IV Push once  dextrose 50% Injectable 25 Gram(s) IV Push once  dextrose 50% Injectable 25 Gram(s) IV Push once  heparin  Injectable 5000 Unit(s) SubCutaneous every 8 hours  insulin glargine Injectable (LANTUS) 10 Unit(s) SubCutaneous at bedtime  insulin lispro (HumaLOG) corrective regimen sliding scale   SubCutaneous three times a day before meals  insulin lispro Injectable (HumaLOG) 5 Unit(s) SubCutaneous three times a day before meals  labetalol 300 milliGRAM(s) Oral three times a day  NIFEdipine XL 60 milliGRAM(s) Oral daily  predniSONE   Tablet 5 milliGRAM(s) Oral daily  tacrolimus 3 milliGRAM(s) Oral every 12 hours  tamsulosin 0.4 milliGRAM(s) Oral at bedtime      PHYSICAL EXAM:  T(C): 36.4 (03-17-19 @ 07:23), Max: 36.9 (03-16-19 @ 15:47)  HR: 68 (03-17-19 @ 07:23) (63 - 75)  BP: 153/83 (03-17-19 @ 07:23) (125/73 - 177/96)  RR: 18 (03-17-19 @ 07:23) (18 - 18)  SpO2: 98% (03-17-19 @ 07:23) (97% - 99%)  Wt(kg): --  I&O's Summary    16 Mar 2019 07:01  -  17 Mar 2019 07:00  --------------------------------------------------------  IN: 1440 mL / OUT: 750 mL / NET: 690 mL    17 Mar 2019 07:01  -  17 Mar 2019 14:02  --------------------------------------------------------  IN: 540 mL / OUT: 0 mL / NET: 540 mL            JVP: Normal  Neck: supple  Lung: clear   CV: S1 S2 , Murmur:  Abd: soft  Ext: No edema  neuro: Awake / alert  Psych: flat affect  Skin: normal``    LABS/DATA:    CARDIAC MARKERS:            03-17    142  |  108  |  20  ----------------------------<  69<L>  4.3   |  22  |  1.86<H>    Ca    8.6      17 Mar 2019 05:52      proBNP:   Lipid Profile:   HgA1c:   TSH:     TELE:  EKG:

## 2019-03-17 NOTE — PROGRESS NOTE ADULT - SUBJECTIVE AND OBJECTIVE BOX
Chief complaint  Patient is a 63y old  Male who presents with a chief complaint of fever (17 Mar 2019 14:02)   Review of systems  Patient in bed, looks comfortable, no fever,  had hypoglycemia.    Labs and Fingersticks  CAPILLARY BLOOD GLUCOSE      POCT Blood Glucose.: 185 mg/dL (17 Mar 2019 16:56)  POCT Blood Glucose.: 271 mg/dL (17 Mar 2019 12:23)  POCT Blood Glucose.: 87 mg/dL (17 Mar 2019 08:26)  POCT Blood Glucose.: 101 mg/dL (16 Mar 2019 22:09)  POCT Blood Glucose.: 86 mg/dL (16 Mar 2019 21:47)  POCT Blood Glucose.: 94 mg/dL (16 Mar 2019 21:11)      Anion Gap, Serum: 12 (03-17 @ 05:52)  Anion Gap, Serum: 12 (03-16 @ 07:04)      Calcium, Total Serum: 8.6 (03-17 @ 05:52)  Calcium, Total Serum: 8.9 (03-16 @ 07:04)          03-17    142  |  108  |  20  ----------------------------<  69<L>  4.3   |  22  |  1.86<H>    Ca    8.6      17 Mar 2019 05:52      Medications  MEDICATIONS  (STANDING):  aspirin enteric coated 81 milliGRAM(s) Oral daily  atorvastatin 20 milliGRAM(s) Oral at bedtime  calcium carbonate   1250 mG (OsCal) 1 Tablet(s) Oral two times a day  chlorhexidine 4% Liquid 1 Application(s) Topical <User Schedule>  dextrose 5%. 1000 milliLiter(s) (50 mL/Hr) IV Continuous <Continuous>  dextrose 50% Injectable 12.5 Gram(s) IV Push once  dextrose 50% Injectable 25 Gram(s) IV Push once  dextrose 50% Injectable 25 Gram(s) IV Push once  heparin  Injectable 5000 Unit(s) SubCutaneous every 8 hours  insulin glargine Injectable (LANTUS) 10 Unit(s) SubCutaneous at bedtime  insulin lispro (HumaLOG) corrective regimen sliding scale   SubCutaneous three times a day before meals  insulin lispro Injectable (HumaLOG) 5 Unit(s) SubCutaneous three times a day before meals  labetalol 300 milliGRAM(s) Oral three times a day  NIFEdipine XL 60 milliGRAM(s) Oral daily  predniSONE   Tablet 5 milliGRAM(s) Oral daily  tacrolimus 3 milliGRAM(s) Oral every 12 hours  tamsulosin 0.4 milliGRAM(s) Oral at bedtime      Physical Exam  General: Patient comfortable in bed  Vital Signs Last 12 Hrs  T(F): 98.2 (03-17-19 @ 15:50), Max: 98.2 (03-17-19 @ 15:50)  HR: 72 (03-17-19 @ 15:50) (68 - 72)  BP: 145/77 (03-17-19 @ 15:50) (145/77 - 153/83)  BP(mean): --  RR: 18 (03-17-19 @ 15:50) (18 - 18)  SpO2: 98% (03-17-19 @ 15:50) (98% - 98%)  Neck: No palpable thyroid nodules.  CVS: S1S2, No murmurs  Respiratory: No wheezing, no crepitations  GI: Abdomen soft, bowel sounds positive  Musculoskeletal:  edema lower extremities.   Skin: No skin rashes, no ecchymosis    Diagnostics

## 2019-03-17 NOTE — PROGRESS NOTE ADULT - ASSESSMENT
Assessment  DMT2: 63y Male with DM T2 with hyperglycemia on basal bolus insulin blood sugars trending down,, had new hypoglycemic episode in am, eating meals, insulin dose adjusted today,  non compliant with low carb diet.  Fever: On medications, stable, monitored.  HTN: Controlled, no headaches, on medications.  ESRD: On hemodialysis, Monitor labs/BMP       Problem/Plan - 1:  ·  Problem: Type 2 diabetes mellitus.  Plan:Will decrease Lantus to 10 units at bed time.  Will decrease Humalog to 5 units before each meal in addition to Humalog correction scale coverage.  Patient counseled for compliance with consistent low carb diet.     Problem/Plan - 2:  ·  Problem: Fever of unknown origin (FUO).  Plan: Continue treatment, primary team FU.      Problem/Plan - 3:  ·  Problem: Hypertension.  Plan: Continue medications, monitoring, primary team FU.      Problem/Plan - 4:  ·  Problem: ESRD (end stage renal disease).  Plan: On HD, continue as scheduled, renal team FU.

## 2019-03-17 NOTE — PROGRESS NOTE ADULT - SUBJECTIVE AND OBJECTIVE BOX
CC: Patient is a 63y old  Male who presents with a chief complaint of fever (16 Mar 2019 16:48)    ID following for bacteremia, pyelonephritis, transplant recipient    Interval History/ROS: Patient feels well. Now with Santana catheter. Denies fever, chills.    Rest of ROS negative.    Allergies  No Known Drug Allergies  Seafood (Pruritus; Rash)    ANTIMICROBIALS:      OTHER MEDS:  acetaminophen   Tablet .. 650 milliGRAM(s) Oral every 6 hours PRN  aspirin enteric coated 81 milliGRAM(s) Oral daily  atorvastatin 20 milliGRAM(s) Oral at bedtime  calcium carbonate   1250 mG (OsCal) 1 Tablet(s) Oral two times a day  chlorhexidine 4% Liquid 1 Application(s) Topical <User Schedule>  dextrose 40% Gel 15 Gram(s) Oral once PRN  dextrose 5%. 1000 milliLiter(s) IV Continuous <Continuous>  dextrose 50% Injectable 12.5 Gram(s) IV Push once  dextrose 50% Injectable 25 Gram(s) IV Push once  dextrose 50% Injectable 25 Gram(s) IV Push once  glucagon  Injectable 1 milliGRAM(s) IntraMuscular once PRN  heparin  Injectable 5000 Unit(s) SubCutaneous every 8 hours  insulin glargine Injectable (LANTUS) 10 Unit(s) SubCutaneous at bedtime  insulin lispro (HumaLOG) corrective regimen sliding scale   SubCutaneous three times a day before meals  insulin lispro Injectable (HumaLOG) 5 Unit(s) SubCutaneous three times a day before meals  labetalol 300 milliGRAM(s) Oral three times a day  NIFEdipine XL 60 milliGRAM(s) Oral daily  ondansetron Injectable 4 milliGRAM(s) IV Push every 6 hours PRN  predniSONE   Tablet 5 milliGRAM(s) Oral daily  tacrolimus 3 milliGRAM(s) Oral every 12 hours  tamsulosin 0.4 milliGRAM(s) Oral at bedtime    PE:    Vital Signs Last 24 Hrs  T(C): 36.4 (17 Mar 2019 07:23), Max: 36.9 (16 Mar 2019 15:47)  T(F): 97.5 (17 Mar 2019 07:23), Max: 98.5 (16 Mar 2019 15:47)  HR: 68 (17 Mar 2019 07:23) (63 - 75)  BP: 153/83 (17 Mar 2019 07:23) (125/73 - 177/96)  BP(mean): --  RR: 18 (17 Mar 2019 07:23) (18 - 18)  SpO2: 98% (17 Mar 2019 07:23) (97% - 99%)    Gen: AOx3, NAD, non-toxic  CV: S1+S2 normal, no murmurs  Resp: Clear bilat, no resp distress  Abd: Soft, nontender, +BS  Ext: No LE edema, no wounds  : No Coker  IV/Skin: Santana catheter intact  Neuro: no focal deficits    LABS:        03-17    142  |  108  |  20  ----------------------------<  69<L>  4.3   |  22  |  1.86<H>    Ca    8.6      17 Mar 2019 05:52    MICROBIOLOGY:  Vancomycin Level, Random: 16.2 ug/mL (03-17-19 @ 05:52)  v  .Blood Blood-Peripheral  03-12-19   No growth to date.  --  --    .Urine Clean Catch (Midstream)  03-08-19   >100,000 CFU/ml Coag Negative Staphylococcus  "Susceptibilities not performed"  --  --    .Blood Blood  03-08-19   Growth in aerobic and anaerobic bottles: Staphylococcus epidermidis  --  Blood Culture PCR  Coag Negative Staphylococcus    RADIOLOGY:    < from: CT Abdomen and Pelvis No Cont (03.08.19 @ 20:05) >  IMPRESSION:     Right pelvic transplant kidney with nonspecific surrounding fat   stranding.No hydronephrosis. In the setting of fever, consider   pyelonephritis. Correlation with urinalysis is recommended.    No appendicitis, colitis, or bowel obstruction.      < end of copied text >

## 2019-03-17 NOTE — PROGRESS NOTE ADULT - SUBJECTIVE AND OBJECTIVE BOX
Patient is a 63y old  Male who presents with a chief complaint of fever (17 Mar 2019 18:54)      INTERVAL HPI/OVERNIGHT EVENTS:  T(C): 36.8 (03-17-19 @ 15:50), Max: 36.8 (03-17-19 @ 15:50)  HR: 72 (03-17-19 @ 15:50) (63 - 73)  BP: 145/77 (03-17-19 @ 15:50) (125/73 - 160/88)  RR: 18 (03-17-19 @ 15:50) (18 - 18)  SpO2: 98% (03-17-19 @ 15:50) (98% - 99%)  Wt(kg): --  I&O's Summary    16 Mar 2019 07:01  -  17 Mar 2019 07:00  --------------------------------------------------------  IN: 1440 mL / OUT: 750 mL / NET: 690 mL    17 Mar 2019 07:01  -  17 Mar 2019 19:31  --------------------------------------------------------  IN: 540 mL / OUT: 0 mL / NET: 540 mL        LABS:    03-17    142  |  108  |  20  ----------------------------<  69<L>  4.3   |  22  |  1.86<H>    Ca    8.6      17 Mar 2019 05:52          CAPILLARY BLOOD GLUCOSE      POCT Blood Glucose.: 185 mg/dL (17 Mar 2019 16:56)  POCT Blood Glucose.: 271 mg/dL (17 Mar 2019 12:23)  POCT Blood Glucose.: 87 mg/dL (17 Mar 2019 08:26)  POCT Blood Glucose.: 101 mg/dL (16 Mar 2019 22:09)  POCT Blood Glucose.: 86 mg/dL (16 Mar 2019 21:47)  POCT Blood Glucose.: 94 mg/dL (16 Mar 2019 21:11)            MEDICATIONS  (STANDING):  aspirin enteric coated 81 milliGRAM(s) Oral daily  atorvastatin 20 milliGRAM(s) Oral at bedtime  calcium carbonate   1250 mG (OsCal) 1 Tablet(s) Oral two times a day  chlorhexidine 4% Liquid 1 Application(s) Topical <User Schedule>  dextrose 5%. 1000 milliLiter(s) (50 mL/Hr) IV Continuous <Continuous>  dextrose 50% Injectable 12.5 Gram(s) IV Push once  dextrose 50% Injectable 25 Gram(s) IV Push once  dextrose 50% Injectable 25 Gram(s) IV Push once  heparin  Injectable 5000 Unit(s) SubCutaneous every 8 hours  insulin glargine Injectable (LANTUS) 10 Unit(s) SubCutaneous at bedtime  insulin lispro (HumaLOG) corrective regimen sliding scale   SubCutaneous three times a day before meals  insulin lispro Injectable (HumaLOG) 5 Unit(s) SubCutaneous three times a day before meals  labetalol 300 milliGRAM(s) Oral three times a day  NIFEdipine XL 60 milliGRAM(s) Oral daily  predniSONE   Tablet 5 milliGRAM(s) Oral daily  tacrolimus 3 milliGRAM(s) Oral every 12 hours  tamsulosin 0.4 milliGRAM(s) Oral at bedtime    MEDICATIONS  (PRN):  acetaminophen   Tablet .. 650 milliGRAM(s) Oral every 6 hours PRN Temp greater or equal to 38C (100.4F)  dextrose 40% Gel 15 Gram(s) Oral once PRN Blood Glucose LESS THAN 70 milliGRAM(s)/deciliter  glucagon  Injectable 1 milliGRAM(s) IntraMuscular once PRN Glucose LESS THAN 70 milligrams/deciliter  ondansetron Injectable 4 milliGRAM(s) IV Push every 6 hours PRN Nausea          PHYSICAL EXAM:  GENERAL: NAD, well-groomed, well-developed  HEAD:  Atraumatic, Normocephalic  CHEST/LUNG: Clear to percussion bilaterally; No rales, rhonchi, wheezing, or rubs  HEART: Regular rate and rhythm; No murmurs, rubs, or gallops  ABDOMEN: Soft, Nontender, Nondistended; Bowel sounds present  EXTREMITIES:  2+ Peripheral Pulses, No clubbing, cyanosis, or edema  LYMPH: No lymphadenopathy noted  SKIN: No rashes or lesions    Care Discussed with Consultants/Other Providers [ ] YES  [ ] NO

## 2019-03-17 NOTE — PROGRESS NOTE ADULT - SUBJECTIVE AND OBJECTIVE BOX
INTERVAL HPI/OVERNIGHT EVENTS:  No new overnight event.  No N/V/D.  Tolerating diet.  having some bm    Allergies    No Known Drug Allergies  Seafood (Pruritus; Rash)    Intolerances    General:  No wt loss, fevers, chills, night sweats, fatigue,   Eyes:  Good vision, no reported pain  ENT:  No sore throat, pain, runny nose, dysphagia  CV:  No pain, palpitations, hypo/hypertension  Resp:  No dyspnea, cough, tachypnea, wheezing  GI:  No pain, No nausea, No vomiting, No diarrhea, No constipation, No weight loss, No fever, No pruritis, No rectal bleeding, No tarry stools, No dysphagia,  :  No pain, bleeding, incontinence, nocturia  Muscle:  No pain, weakness  Neuro:  No weakness, tingling, memory problems  Psych:  No fatigue, insomnia, mood problems, depression  Endocrine:  No polyuria, polydipsia, cold/heat intolerance  Heme:  No petechiae, ecchymosis, easy bruisability  Skin:  No rash, tattoos, scars, edema      PHYSICAL EXAM:   Vital Signs:  Vital Signs Last 24 Hrs  T(C): 36.4 (17 Mar 2019 07:23), Max: 36.9 (16 Mar 2019 15:47)  T(F): 97.5 (17 Mar 2019 07:23), Max: 98.5 (16 Mar 2019 15:47)  HR: 68 (17 Mar 2019 07:23) (63 - 75)  BP: 153/83 (17 Mar 2019 07:23) (125/73 - 177/96)  BP(mean): --  RR: 18 (17 Mar 2019 07:23) (18 - 18)  SpO2: 98% (17 Mar 2019 07:23) (97% - 99%)  Daily     Daily I&O's Summary    16 Mar 2019 07:01  -  17 Mar 2019 07:00  --------------------------------------------------------  IN: 1440 mL / OUT: 750 mL / NET: 690 mL        GENERAL:  Appears stated age, well-groomed, well-nourished, no distress  HEENT:  NC/AT,  conjunctivae clear and pink, no thyromegaly, nodules, adenopathy, no JVD, sclera -anicteric  CHEST:  Full & symmetric excursion, no increased effort, breath sounds clear  HEART:  Regular rhythm, S1, S2, no murmur/rub/S3/S4, no abdominal bruit, no edema  ABDOMEN:  Soft, non-tender, non-distended, normoactive bowel sounds,  no masses ,no hepato-splenomegaly, no signs of chronic liver disease  EXTEREMITIES:  no cyanosis,clubbing or edema  SKIN:  No rash/erythema/ecchymoses/petechiae/wounds/abscess/warm/dry  NEURO:  Alert, oriented, no asterixis, no tremor, no encephalopathy      LABS:    03-17    142  |  108  |  20  ----------------------------<  69<L>  4.3   |  22  |  1.86<H>    Ca    8.6      17 Mar 2019 05:52          amylase   lipase  RADIOLOGY & ADDITIONAL TESTS:

## 2019-03-17 NOTE — PROGRESS NOTE ADULT - ASSESSMENT
63 M with DM and ESRD s/p renal transplant 2011 on Cellcept, Tacrolimus and Prednisone, admitted 3/7/19 with acute fevers. Associated vomiting and one episode diarrhea. Daughter-in-law with brief febrile illness one week prior.   Here 101.6F. Nontoxic but leukocytosis to 14 and worsening renal function  negative  UA, CXR, RVP.   abd/pelvis CT: non specific fat stranding surrounding the transplant kidney  blood cx: high grade coag neg staph, urine also coag neg staph    renal transplant immunocompromised pt with sepsis, fever, leukocytosis, BENEDICTO and transplant  fat stranding concerning for pyelonephritis  high grade coag neg staph bacteremia and ?UTI and transplant pyelonephritis     Recommend:  -Redose vancomycin today  -On discharge can continue vancomycin 1250 mg IV q 24 hours  -Anticipate a 2-week course of vancomycin IV to complete 3/21/19  -Can follow up in ID clinic in 2-3 weeks.  -For appointments can call [phone].

## 2019-03-18 VITALS
TEMPERATURE: 98 F | DIASTOLIC BLOOD PRESSURE: 83 MMHG | HEART RATE: 71 BPM | OXYGEN SATURATION: 98 % | RESPIRATION RATE: 18 BRPM | SYSTOLIC BLOOD PRESSURE: 146 MMHG

## 2019-03-18 LAB
ANION GAP SERPL CALC-SCNC: 9 MMOL/L — SIGNIFICANT CHANGE UP (ref 5–17)
BUN SERPL-MCNC: 25 MG/DL — HIGH (ref 7–23)
CALCIUM SERPL-MCNC: 8.8 MG/DL — SIGNIFICANT CHANGE UP (ref 8.4–10.5)
CHLORIDE SERPL-SCNC: 108 MMOL/L — SIGNIFICANT CHANGE UP (ref 96–108)
CO2 SERPL-SCNC: 23 MMOL/L — SIGNIFICANT CHANGE UP (ref 22–31)
CREAT SERPL-MCNC: 2.09 MG/DL — HIGH (ref 0.5–1.3)
GLUCOSE BLDC GLUCOMTR-MCNC: 162 MG/DL — HIGH (ref 70–99)
GLUCOSE BLDC GLUCOMTR-MCNC: 226 MG/DL — HIGH (ref 70–99)
GLUCOSE BLDC GLUCOMTR-MCNC: 80 MG/DL — SIGNIFICANT CHANGE UP (ref 70–99)
GLUCOSE SERPL-MCNC: 64 MG/DL — LOW (ref 70–99)
HCT VFR BLD CALC: 34.7 % — LOW (ref 39–50)
HGB BLD-MCNC: 9.9 G/DL — LOW (ref 13–17)
MCHC RBC-ENTMCNC: 21.4 PG — LOW (ref 27–34)
MCHC RBC-ENTMCNC: 28.5 GM/DL — LOW (ref 32–36)
MCV RBC AUTO: 74.9 FL — LOW (ref 80–100)
PLATELET # BLD AUTO: 257 K/UL — SIGNIFICANT CHANGE UP (ref 150–400)
POTASSIUM SERPL-MCNC: 4.7 MMOL/L — SIGNIFICANT CHANGE UP (ref 3.5–5.3)
POTASSIUM SERPL-SCNC: 4.7 MMOL/L — SIGNIFICANT CHANGE UP (ref 3.5–5.3)
RBC # BLD: 4.63 M/UL — SIGNIFICANT CHANGE UP (ref 4.2–5.8)
RBC # FLD: 15.8 % — HIGH (ref 10.3–14.5)
SODIUM SERPL-SCNC: 140 MMOL/L — SIGNIFICANT CHANGE UP (ref 135–145)
TACROLIMUS SERPL-MCNC: 5.2 NG/ML — SIGNIFICANT CHANGE UP
VANCOMYCIN FLD-MCNC: 18.9 UG/ML — SIGNIFICANT CHANGE UP
WBC # BLD: 4.97 K/UL — SIGNIFICANT CHANGE UP (ref 3.8–10.5)
WBC # FLD AUTO: 4.97 K/UL — SIGNIFICANT CHANGE UP (ref 3.8–10.5)

## 2019-03-18 PROCEDURE — 82435 ASSAY OF BLOOD CHLORIDE: CPT

## 2019-03-18 PROCEDURE — 93306 TTE W/DOPPLER COMPLETE: CPT

## 2019-03-18 PROCEDURE — 87633 RESP VIRUS 12-25 TARGETS: CPT

## 2019-03-18 PROCEDURE — 87486 CHLMYD PNEUM DNA AMP PROBE: CPT

## 2019-03-18 PROCEDURE — 96360 HYDRATION IV INFUSION INIT: CPT

## 2019-03-18 PROCEDURE — 85014 HEMATOCRIT: CPT

## 2019-03-18 PROCEDURE — 86803 HEPATITIS C AB TEST: CPT

## 2019-03-18 PROCEDURE — 77001 FLUOROGUIDE FOR VEIN DEVICE: CPT

## 2019-03-18 PROCEDURE — 74176 CT ABD & PELVIS W/O CONTRAST: CPT

## 2019-03-18 PROCEDURE — 84295 ASSAY OF SERUM SODIUM: CPT

## 2019-03-18 PROCEDURE — 87186 SC STD MICRODIL/AGAR DIL: CPT

## 2019-03-18 PROCEDURE — 81001 URINALYSIS AUTO W/SCOPE: CPT

## 2019-03-18 PROCEDURE — 82962 GLUCOSE BLOOD TEST: CPT

## 2019-03-18 PROCEDURE — 82010 KETONE BODYS QUAN: CPT

## 2019-03-18 PROCEDURE — 87581 M.PNEUMON DNA AMP PROBE: CPT

## 2019-03-18 PROCEDURE — C1751: CPT

## 2019-03-18 PROCEDURE — 82803 BLOOD GASES ANY COMBINATION: CPT

## 2019-03-18 PROCEDURE — 71046 X-RAY EXAM CHEST 2 VIEWS: CPT

## 2019-03-18 PROCEDURE — 87798 DETECT AGENT NOS DNA AMP: CPT

## 2019-03-18 PROCEDURE — 99232 SBSQ HOSP IP/OBS MODERATE 35: CPT | Mod: GC

## 2019-03-18 PROCEDURE — 80048 BASIC METABOLIC PNL TOTAL CA: CPT

## 2019-03-18 PROCEDURE — 97161 PT EVAL LOW COMPLEX 20 MIN: CPT

## 2019-03-18 PROCEDURE — 36558 INSERT TUNNELED CV CATH: CPT

## 2019-03-18 PROCEDURE — 87086 URINE CULTURE/COLONY COUNT: CPT

## 2019-03-18 PROCEDURE — 83036 HEMOGLOBIN GLYCOSYLATED A1C: CPT

## 2019-03-18 PROCEDURE — 93005 ELECTROCARDIOGRAM TRACING: CPT

## 2019-03-18 PROCEDURE — 76937 US GUIDE VASCULAR ACCESS: CPT

## 2019-03-18 PROCEDURE — 80053 COMPREHEN METABOLIC PANEL: CPT

## 2019-03-18 PROCEDURE — 84132 ASSAY OF SERUM POTASSIUM: CPT

## 2019-03-18 PROCEDURE — 84484 ASSAY OF TROPONIN QUANT: CPT

## 2019-03-18 PROCEDURE — 83605 ASSAY OF LACTIC ACID: CPT

## 2019-03-18 PROCEDURE — 82330 ASSAY OF CALCIUM: CPT

## 2019-03-18 PROCEDURE — 96361 HYDRATE IV INFUSION ADD-ON: CPT

## 2019-03-18 PROCEDURE — 80197 ASSAY OF TACROLIMUS: CPT

## 2019-03-18 PROCEDURE — 87040 BLOOD CULTURE FOR BACTERIA: CPT

## 2019-03-18 PROCEDURE — 82947 ASSAY GLUCOSE BLOOD QUANT: CPT

## 2019-03-18 PROCEDURE — 80202 ASSAY OF VANCOMYCIN: CPT

## 2019-03-18 PROCEDURE — 87150 DNA/RNA AMPLIFIED PROBE: CPT

## 2019-03-18 PROCEDURE — 85027 COMPLETE CBC AUTOMATED: CPT

## 2019-03-18 PROCEDURE — C1769: CPT

## 2019-03-18 PROCEDURE — 99285 EMERGENCY DEPT VISIT HI MDM: CPT | Mod: 25

## 2019-03-18 RX ORDER — INSULIN DETEMIR 100/ML (3)
15 INSULIN PEN (ML) SUBCUTANEOUS
Qty: 0 | Refills: 0 | COMMUNITY

## 2019-03-18 RX ORDER — INSULIN ASPART 100 [IU]/ML
10 INJECTION, SOLUTION SUBCUTANEOUS
Qty: 0 | Refills: 0 | COMMUNITY

## 2019-03-18 RX ORDER — LOSARTAN POTASSIUM 100 MG/1
1 TABLET, FILM COATED ORAL
Qty: 0 | Refills: 0 | COMMUNITY

## 2019-03-18 RX ORDER — VANCOMYCIN HCL 1 G
1250 VIAL (EA) INTRAVENOUS ONCE
Qty: 0 | Refills: 0 | Status: COMPLETED | OUTPATIENT
Start: 2019-03-18 | End: 2019-03-18

## 2019-03-18 RX ORDER — TAMSULOSIN HYDROCHLORIDE 0.4 MG/1
1 CAPSULE ORAL
Qty: 0 | Refills: 0 | COMMUNITY

## 2019-03-18 RX ADMIN — Medication 300 MILLIGRAM(S): at 05:46

## 2019-03-18 RX ADMIN — Medication 81 MILLIGRAM(S): at 12:58

## 2019-03-18 RX ADMIN — Medication 1: at 12:54

## 2019-03-18 RX ADMIN — Medication 60 MILLIGRAM(S): at 05:46

## 2019-03-18 RX ADMIN — HEPARIN SODIUM 5000 UNIT(S): 5000 INJECTION INTRAVENOUS; SUBCUTANEOUS at 12:57

## 2019-03-18 RX ADMIN — Medication 5 MILLIGRAM(S): at 05:46

## 2019-03-18 RX ADMIN — HEPARIN SODIUM 5000 UNIT(S): 5000 INJECTION INTRAVENOUS; SUBCUTANEOUS at 05:45

## 2019-03-18 RX ADMIN — TACROLIMUS 3 MILLIGRAM(S): 5 CAPSULE ORAL at 06:34

## 2019-03-18 RX ADMIN — Medication 5 UNIT(S): at 12:55

## 2019-03-18 RX ADMIN — Medication 166.67 MILLIGRAM(S): at 13:57

## 2019-03-18 RX ADMIN — Medication 5 UNIT(S): at 08:45

## 2019-03-18 RX ADMIN — CHLORHEXIDINE GLUCONATE 1 APPLICATION(S): 213 SOLUTION TOPICAL at 05:46

## 2019-03-18 RX ADMIN — Medication 300 MILLIGRAM(S): at 12:58

## 2019-03-18 RX ADMIN — Medication 5 UNIT(S): at 17:18

## 2019-03-18 RX ADMIN — Medication 2: at 17:18

## 2019-03-18 RX ADMIN — Medication 1 TABLET(S): at 05:46

## 2019-03-18 NOTE — PROGRESS NOTE ADULT - NSHPATTENDINGPLANDISCUSS_GEN_ALL_CORE
the primary team
the primary team
primary team
House staff
surgical transplant team

## 2019-03-18 NOTE — PROGRESS NOTE ADULT - ATTENDING COMMENTS
Andrea Dias MD  Pager [phone]  After 5pm/weekends call [phone]
Andrea Dias MD  Pager [phone]  After 5pm/weekends call [phone]
Andrea Dias MD  Pager [phone]  After 5pm/weekends call [phone]
Andrea Dias MD  Pager [phone]  After 5pm/weekends call [phone]
Andrea Dias MD  Pager [phone]  After 5pm/weekends call [phone]
Andrea Dias MD  Pager [phone]  After 5pm/weekends call [phone]
Patient with functioning renal allograft, BENEDICTO, UTI, pyelo, bacteremia.  Now improving creatinine  Reviewed immunosuppression and allograft function  Will follow cr and Tac level  I was present during and reviewed clinical and lab data as well as assessment and plan as documented by the house staff as noted. Please contact if any additional questions with any change in clinical condition or on availability of any additional information or reports.
Please reduce tacro to 3mgs twice per day and increase labetalol to 300mgs TID.  Renal function is stable.
Hold MMF  start IVF - endo eval for DKA  Please check troponin  Continue tacrolimus and prednisone for transplant and check tacro trough in the morning.  Work up for infectious etiology - check blood culture, urine culture.  Viral gastro also a possibility although no current diarrhea.

## 2019-03-18 NOTE — PROGRESS NOTE ADULT - PROVIDER SPECIALTY LIST ADULT
Cardiology
Endocrinology
Gastroenterology
Hospitalist
Infectious Disease
Intervent Radiology
Nephrology

## 2019-03-18 NOTE — PROGRESS NOTE ADULT - SUBJECTIVE AND OBJECTIVE BOX
Interval Events: pt seen and examined. He denies abdominal pain, n/v  tolerating PO well     MEDICATIONS  (STANDING):  aspirin enteric coated 81 milliGRAM(s) Oral daily  atorvastatin 20 milliGRAM(s) Oral at bedtime  calcium carbonate   1250 mG (OsCal) 1 Tablet(s) Oral two times a day  chlorhexidine 4% Liquid 1 Application(s) Topical <User Schedule>  dextrose 5%. 1000 milliLiter(s) (50 mL/Hr) IV Continuous <Continuous>  dextrose 50% Injectable 12.5 Gram(s) IV Push once  dextrose 50% Injectable 25 Gram(s) IV Push once  dextrose 50% Injectable 25 Gram(s) IV Push once  heparin  Injectable 5000 Unit(s) SubCutaneous every 8 hours  insulin glargine Injectable (LANTUS) 10 Unit(s) SubCutaneous at bedtime  insulin lispro (HumaLOG) corrective regimen sliding scale   SubCutaneous three times a day before meals  insulin lispro Injectable (HumaLOG) 5 Unit(s) SubCutaneous three times a day before meals  labetalol 300 milliGRAM(s) Oral three times a day  NIFEdipine XL 60 milliGRAM(s) Oral daily  predniSONE   Tablet 5 milliGRAM(s) Oral daily  tacrolimus 3 milliGRAM(s) Oral every 12 hours  tamsulosin 0.4 milliGRAM(s) Oral at bedtime    MEDICATIONS  (PRN):  acetaminophen   Tablet .. 650 milliGRAM(s) Oral every 6 hours PRN Temp greater or equal to 38C (100.4F)  dextrose 40% Gel 15 Gram(s) Oral once PRN Blood Glucose LESS THAN 70 milliGRAM(s)/deciliter  glucagon  Injectable 1 milliGRAM(s) IntraMuscular once PRN Glucose LESS THAN 70 milligrams/deciliter  ondansetron Injectable 4 milliGRAM(s) IV Push every 6 hours PRN Nausea      Allergies    No Known Drug Allergies  Seafood (Pruritus; Rash)    Intolerances        Review of Systems:    General:  No wt loss, fevers, chills, night sweats,fatigue,   Eyes:  Good vision, no reported pain  ENT:  No sore throat, pain, runny nose, dysphagia  CV:  No pain, palpitations, hypo/hypertension  Resp:  No dyspnea, cough, tachypnea, wheezing  GI:  No pain, No nausea, No vomiting, No diarrhea, No constipation, No weight loss, No fever, No pruritis, No rectal bleeding, No melena, No dysphagia  :  No pain, bleeding, incontinence, nocturia  Muscle:  No pain, weakness  Neuro:  No weakness, tingling, memory problems  Psych:  No fatigue, insomnia, mood problems, depression  Endocrine:  No polyuria, polydypsia, cold/heat intolerance  Heme:  No petechiae, ecchymosis, easy bruisability  Skin:  No rash, tattoos, scars, edema      Vital Signs Last 24 Hrs  T(C): 36.9 (18 Mar 2019 05:43), Max: 36.9 (18 Mar 2019 05:43)  T(F): 98.5 (18 Mar 2019 05:43), Max: 98.5 (18 Mar 2019 05:43)  HR: 70 (18 Mar 2019 05:43) (70 - 78)  BP: 155/78 (18 Mar 2019 05:43) (145/77 - 155/78)  BP(mean): --  RR: 18 (18 Mar 2019 05:43) (18 - 18)  SpO2: 98% (18 Mar 2019 05:43) (98% - 98%)    PHYSICAL EXAM:    Constitutional: NAD, well-developed  HEENT: EOMI, throat clear  Neck: No LAD, supple  Respiratory: CTA and P  Cardiovascular: S1 and S2, RRR, no M  Gastrointestinal: BS+, soft, NT/ND, neg HSM,  Extremities: No peripheral edema, neg clubing, cyanosis  Vascular: 2+ peripheral pulses  Neurological: A/O x 3, no focal deficits  Psychiatric: Normal mood, normal affect  Skin: No rashes      LABS:    03-18    140  |  108  |  25<H>  ----------------------------<  64<L>  4.7   |  23  |  2.09<H>    Ca    8.8      18 Mar 2019 07:54            RADIOLOGY & ADDITIONAL TESTS:

## 2019-03-18 NOTE — PROGRESS NOTE ADULT - REASON FOR ADMISSION
fever

## 2019-03-18 NOTE — PROGRESS NOTE ADULT - SUBJECTIVE AND OBJECTIVE BOX
[facility] Division of Kidney Diseases & Hypertension  FOLLOW UP NOTE  [phone]--------------------------------------------------------------------------------  Chief Complaint: BENEDICTO/History of renal transplant     24 hour events/subjective:    Patient seen and examined.  Denies any complaints.     PAST HISTORY  --------------------------------------------------------------------------------  No significant changes to PMH, PSH, FHx, SHx, unless otherwise noted    ALLERGIES & MEDICATIONS  --------------------------------------------------------------------------------  Allergies    No Known Drug Allergies  Seafood (Pruritus; Rash)    Intolerances      Standing Inpatient Medications  aspirin enteric coated 81 milliGRAM(s) Oral daily  atorvastatin 20 milliGRAM(s) Oral at bedtime  calcium carbonate   1250 mG (OsCal) 1 Tablet(s) Oral two times a day  chlorhexidine 4% Liquid 1 Application(s) Topical <User Schedule>  dextrose 5%. 1000 milliLiter(s) IV Continuous <Continuous>  dextrose 50% Injectable 12.5 Gram(s) IV Push once  dextrose 50% Injectable 25 Gram(s) IV Push once  dextrose 50% Injectable 25 Gram(s) IV Push once  heparin  Injectable 5000 Unit(s) SubCutaneous every 8 hours  insulin lispro (HumaLOG) corrective regimen sliding scale   SubCutaneous three times a day before meals  insulin lispro Injectable (HumaLOG) 5 Unit(s) SubCutaneous three times a day before meals  labetalol 300 milliGRAM(s) Oral three times a day  NIFEdipine XL 60 milliGRAM(s) Oral daily  predniSONE   Tablet 5 milliGRAM(s) Oral daily  tacrolimus 3 milliGRAM(s) Oral every 12 hours  tamsulosin 0.4 milliGRAM(s) Oral at bedtime    PRN Inpatient Medications  acetaminophen   Tablet .. 650 milliGRAM(s) Oral every 6 hours PRN  dextrose 40% Gel 15 Gram(s) Oral once PRN  glucagon  Injectable 1 milliGRAM(s) IntraMuscular once PRN  ondansetron Injectable 4 milliGRAM(s) IV Push every 6 hours PRN      REVIEW OF SYSTEMS  --------------------------------------------------------------------------------  Gen: No  fevers/chills  Skin: No rashes  Head/Eyes/Ears/Mouth: No headache; Normal hearing; Normal vision w/o blurriness  Respiratory: No dyspnea, cough, wheezing, hemoptysis  CV: No chest pain, PND, orthopnea  GI: No abdominal pain, diarrhea, constipation, nausea, vomiting  : No increased frequency, dysuria, hematuria, nocturia  MSK: No joint pain/swelling; no back pain; no edema  Neuro: No dizziness/lightheadedness, weakness, seizures, numbness, tingling      All other systems were reviewed and are negative, except as noted.    VITALS/PHYSICAL EXAM  --------------------------------------------------------------------------------  T(C): 36.9 (03-18-19 @ 09:25), Max: 36.9 (03-18-19 @ 05:43)  HR: 71 (03-18-19 @ 09:25) (70 - 78)  BP: 158/84 (03-18-19 @ 09:25) (145/77 - 158/84)  RR: 18 (03-18-19 @ 09:25) (18 - 18)  SpO2: 95% (03-18-19 @ 09:25) (95% - 98%)  Wt(kg): --        03-17-19 @ 07:01  -  03-18-19 @ 07:00  --------------------------------------------------------  IN: 1040 mL / OUT: 0 mL / NET: 1040 mL      Physical Exam:    Gen: NAD  	HEENT: sclera anicteric  	Pulm: CTA B/L  	CV: RRR, S1S2;  	Abd: +BS, soft, nontender/nondistended              Extremities: no bilateral LE edema noted.               Neuro: No focal deficits  	Skin: Warm and dry              Vascular access: Right UE thrombosed AVF present.     LABS/STUDIES  --------------------------------------------------------------------------------              9.9    4.97  >-----------<  257      [03-18-19 @ 11:58]              34.7     140  |  108  |  25  ----------------------------<  64      [03-18-19 @ 07:54]  4.7   |  23  |  2.09        Ca     8.8     [03-18-19 @ 07:54]            Creatinine Trend:  SCr 2.09 [03-18 @ 07:54]  SCr 1.86 [03-17 @ 05:52]  SCr 1.98 [03-16 @ 07:04]  SCr 2.10 [03-15 @ 05:31]  SCr 2.03 [03-13 @ 07:02]

## 2019-03-18 NOTE — PROGRESS NOTE ADULT - ASSESSMENT
63 year old male with h/o DDRT in 2011, CKD (baseline sCr 1.6-1.8) found to have BENEDICTO on CKD in setting of vomiting, infection and ARB use.    1. BENEDICTO on CKD: Patient with BENEDICTO on CKD in setting of pyelonephritis and bacteremia. Baseline Scr ~1.6-1.8. Scr. was noted to be elevated at 2.13 on admission and had increased to 2.45 on 3/11/19. Latest Scr. remains elevated but stable at 2.09. CT abdomen and pelvis done on 3/8 shows no hydronephrosis. Remains non oliguric. Continue with IV antibiotics. Monitor vancomycin levels. Continue to hold ARB. Monitor BMP daily. Avoid NSAIDs, RCAS, and other nephrotoxins. Patient will need to follow up with Dr. Roque one week after discharge.     2. Immunosuppression: Patient with h/o DDRT in 2011; was on tacrolimus 4/4,  mg BID, and prednisone 5 mg daily. Latest FK noted to be 5.2 which is acceptable. Continue with current dose of immunosuppressants. Can resume  mg BID. Monitor FK levels.

## 2019-03-18 NOTE — PROGRESS NOTE ADULT - ASSESSMENT
Problem: Sepsis  Assessment and Plan: high grade coag neg staph bacteremia and ?UTI and transplant pyelonephritis   - care per ID appreciated, follow their ongoing recs    Problem: Pancreatitis, chronic  Assessment and Plan: pt has hx of ETOH pancreatitis  - asymptomatic at this point  - no further work up at this time    Advanced care planning was discussed with patient and family.  Advanced care planning forms were reviewed and discussed.  Risks, benefits and alternatives of gastroenterologic procedures were discussed in detail and all questions were answered.    30 minutes spent.

## 2019-03-18 NOTE — PROGRESS NOTE ADULT - ASSESSMENT
sepsis  anbx   f/u with ID   echo unremarkable     HTN  cont current meds   will defer to renal for management     CKD  s/p transplant  fu with renal   crt stable     dc planning as per primary team pending IV anbx set up at home

## 2019-03-18 NOTE — PROGRESS NOTE ADULT - SUBJECTIVE AND OBJECTIVE BOX
Chief complaint  Patient is a 63y old  Male who presents with a chief complaint of fever (18 Mar 2019 09:13)   Review of systems  Patient in bed, looks comfortable, no fever,  had hypoglycemia.    Labs and Fingersticks  CAPILLARY BLOOD GLUCOSE      POCT Blood Glucose.: 162 mg/dL (18 Mar 2019 12:25)  POCT Blood Glucose.: 80 mg/dL (18 Mar 2019 08:38)  POCT Blood Glucose.: 100 mg/dL (17 Mar 2019 21:50)  POCT Blood Glucose.: 71 mg/dL (17 Mar 2019 21:23)      Anion Gap, Serum: 9 (03-18 @ 07:54)  Anion Gap, Serum: 12 (03-17 @ 05:52)      Calcium, Total Serum: 8.8 (03-18 @ 07:54)  Calcium, Total Serum: 8.6 (03-17 @ 05:52)          03-18    140  |  108  |  25<H>  ----------------------------<  64<L>  4.7   |  23  |  2.09<H>    Ca    8.8      18 Mar 2019 07:54                          9.9    4.97  )-----------( 257      ( 18 Mar 2019 11:58 )             34.7     Medications  MEDICATIONS  (STANDING):  aspirin enteric coated 81 milliGRAM(s) Oral daily  atorvastatin 20 milliGRAM(s) Oral at bedtime  calcium carbonate   1250 mG (OsCal) 1 Tablet(s) Oral two times a day  chlorhexidine 4% Liquid 1 Application(s) Topical <User Schedule>  dextrose 5%. 1000 milliLiter(s) (50 mL/Hr) IV Continuous <Continuous>  dextrose 50% Injectable 12.5 Gram(s) IV Push once  dextrose 50% Injectable 25 Gram(s) IV Push once  dextrose 50% Injectable 25 Gram(s) IV Push once  heparin  Injectable 5000 Unit(s) SubCutaneous every 8 hours  insulin lispro (HumaLOG) corrective regimen sliding scale   SubCutaneous three times a day before meals  insulin lispro Injectable (HumaLOG) 5 Unit(s) SubCutaneous three times a day before meals  labetalol 300 milliGRAM(s) Oral three times a day  NIFEdipine XL 60 milliGRAM(s) Oral daily  predniSONE   Tablet 5 milliGRAM(s) Oral daily  tacrolimus 3 milliGRAM(s) Oral every 12 hours  tamsulosin 0.4 milliGRAM(s) Oral at bedtime      Physical Exam  General: Patient comfortable in bed  Vital Signs Last 12 Hrs  T(F): 98 (03-18-19 @ 16:00), Max: 98.5 (03-18-19 @ 05:43)  HR: 71 (03-18-19 @ 16:00) (70 - 71)  BP: 146/83 (03-18-19 @ 16:00) (146/83 - 158/84)  BP(mean): --  RR: 18 (03-18-19 @ 16:00) (18 - 18)  SpO2: 98% (03-18-19 @ 16:00) (95% - 98%)  Neck: No palpable thyroid nodules.  CVS: S1S2, No murmurs  Respiratory: No wheezing, no crepitations  GI: Abdomen soft, bowel sounds positive  Musculoskeletal:  edema lower extremities.   Skin: No skin rashes, no ecchymosis    Diagnostics

## 2019-03-18 NOTE — PROGRESS NOTE ADULT - SUBJECTIVE AND OBJECTIVE BOX
Subjective: Patient seen and examined. No new events except as noted.     SUBJECTIVE/ROS:    feels ok     MEDICATIONS:  MEDICATIONS  (STANDING):  aspirin enteric coated 81 milliGRAM(s) Oral daily  atorvastatin 20 milliGRAM(s) Oral at bedtime  calcium carbonate   1250 mG (OsCal) 1 Tablet(s) Oral two times a day  chlorhexidine 4% Liquid 1 Application(s) Topical <User Schedule>  dextrose 5%. 1000 milliLiter(s) (50 mL/Hr) IV Continuous <Continuous>  dextrose 50% Injectable 12.5 Gram(s) IV Push once  dextrose 50% Injectable 25 Gram(s) IV Push once  dextrose 50% Injectable 25 Gram(s) IV Push once  heparin  Injectable 5000 Unit(s) SubCutaneous every 8 hours  insulin glargine Injectable (LANTUS) 10 Unit(s) SubCutaneous at bedtime  insulin lispro (HumaLOG) corrective regimen sliding scale   SubCutaneous three times a day before meals  insulin lispro Injectable (HumaLOG) 5 Unit(s) SubCutaneous three times a day before meals  labetalol 300 milliGRAM(s) Oral three times a day  NIFEdipine XL 60 milliGRAM(s) Oral daily  predniSONE   Tablet 5 milliGRAM(s) Oral daily  tacrolimus 3 milliGRAM(s) Oral every 12 hours  tamsulosin 0.4 milliGRAM(s) Oral at bedtime      PHYSICAL EXAM:  T(C): 36.9 (03-18-19 @ 05:43), Max: 36.9 (03-18-19 @ 05:43)  HR: 70 (03-18-19 @ 05:43) (70 - 78)  BP: 155/78 (03-18-19 @ 05:43) (145/77 - 155/78)  RR: 18 (03-18-19 @ 05:43) (18 - 18)  SpO2: 98% (03-18-19 @ 05:43) (98% - 98%)  Wt(kg): --  I&O's Summary    17 Mar 2019 07:01  -  18 Mar 2019 07:00  --------------------------------------------------------  IN: 1040 mL / OUT: 0 mL / NET: 1040 mL            JVP: Normal  Neck: supple  Lung: clear   CV: S1 S2 , Murmur:  Abd: soft  Ext: No edema  neuro: Awake / alert  Psych: flat affect  Skin: normal``    LABS/DATA:    CARDIAC MARKERS:            03-17    142  |  108  |  20  ----------------------------<  69<L>  4.3   |  22  |  1.86<H>    Ca    8.6      17 Mar 2019 05:52      proBNP:   Lipid Profile:   HgA1c:   TSH:     TELE:  EKG:

## 2019-03-22 DIAGNOSIS — R78.81 BACTEREMIA: ICD-10-CM

## 2019-03-26 ENCOUNTER — APPOINTMENT (OUTPATIENT)
Dept: INFECTIOUS DISEASE | Facility: CLINIC | Age: 64
End: 2019-03-26

## 2019-03-27 ENCOUNTER — FORM ENCOUNTER (OUTPATIENT)
Age: 64
End: 2019-03-27

## 2019-03-28 ENCOUNTER — OUTPATIENT (OUTPATIENT)
Dept: OUTPATIENT SERVICES | Facility: HOSPITAL | Age: 64
LOS: 1 days | End: 2019-03-28
Payer: COMMERCIAL

## 2019-03-28 DIAGNOSIS — R78.71 ABNORMAL LEAD LEVEL IN BLOOD: ICD-10-CM

## 2019-03-28 DIAGNOSIS — R78.81 BACTEREMIA: ICD-10-CM

## 2019-03-28 DIAGNOSIS — Z94.0 KIDNEY TRANSPLANT STATUS: Chronic | ICD-10-CM

## 2019-03-28 DIAGNOSIS — Z98.89 OTHER SPECIFIED POSTPROCEDURAL STATES: Chronic | ICD-10-CM

## 2019-03-28 PROCEDURE — 36589 REMOVAL TUNNELED CV CATH: CPT

## 2019-03-29 ENCOUNTER — INPATIENT (INPATIENT)
Facility: HOSPITAL | Age: 64
LOS: 2 days | Discharge: HOME | DRG: 918 | End: 2019-04-01
Attending: INTERNAL MEDICINE | Admitting: INTERNAL MEDICINE
Payer: COMMERCIAL

## 2019-03-29 VITALS
RESPIRATION RATE: 16 BRPM | DIASTOLIC BLOOD PRESSURE: 87 MMHG | SYSTOLIC BLOOD PRESSURE: 136 MMHG | TEMPERATURE: 98 F | HEART RATE: 96 BPM | OXYGEN SATURATION: 97 %

## 2019-03-29 DIAGNOSIS — Z98.89 OTHER SPECIFIED POSTPROCEDURAL STATES: Chronic | ICD-10-CM

## 2019-03-29 DIAGNOSIS — R56.9 UNSPECIFIED CONVULSIONS: ICD-10-CM

## 2019-03-29 DIAGNOSIS — E16.2 HYPOGLYCEMIA, UNSPECIFIED: ICD-10-CM

## 2019-03-29 DIAGNOSIS — Z94.0 KIDNEY TRANSPLANT STATUS: Chronic | ICD-10-CM

## 2019-03-29 DIAGNOSIS — Z94.0 KIDNEY TRANSPLANT STATUS: ICD-10-CM

## 2019-03-29 DIAGNOSIS — R78.81 BACTEREMIA: ICD-10-CM

## 2019-03-29 DIAGNOSIS — I10 ESSENTIAL (PRIMARY) HYPERTENSION: ICD-10-CM

## 2019-03-29 DIAGNOSIS — E11.311 TYPE 2 DIABETES MELLITUS WITH UNSPECIFIED DIABETIC RETINOPATHY WITH MACULAR EDEMA: ICD-10-CM

## 2019-03-29 DIAGNOSIS — D89.9 DISORDER INVOLVING THE IMMUNE MECHANISM, UNSPECIFIED: ICD-10-CM

## 2019-03-29 LAB
ALBUMIN SERPL ELPH-MCNC: 3.9 G/DL — SIGNIFICANT CHANGE UP (ref 3.3–5)
ALP SERPL-CCNC: 59 U/L — SIGNIFICANT CHANGE UP (ref 40–120)
ALT FLD-CCNC: 33 U/L — SIGNIFICANT CHANGE UP (ref 10–45)
ANION GAP SERPL CALC-SCNC: 13 MMOL/L — SIGNIFICANT CHANGE UP (ref 5–17)
ANISOCYTOSIS BLD QL: SLIGHT — SIGNIFICANT CHANGE UP
APPEARANCE UR: CLEAR — SIGNIFICANT CHANGE UP
AST SERPL-CCNC: 24 U/L — SIGNIFICANT CHANGE UP (ref 10–40)
BACTERIA # UR AUTO: NEGATIVE — SIGNIFICANT CHANGE UP
BASE EXCESS BLDV CALC-SCNC: -2.2 MMOL/L — LOW (ref -2–2)
BASOPHILS # BLD AUTO: 0 K/UL — SIGNIFICANT CHANGE UP (ref 0–0.2)
BILIRUB SERPL-MCNC: 0.2 MG/DL — SIGNIFICANT CHANGE UP (ref 0.2–1.2)
BILIRUB UR-MCNC: NEGATIVE — SIGNIFICANT CHANGE UP
BUN SERPL-MCNC: 22 MG/DL — SIGNIFICANT CHANGE UP (ref 7–23)
BURR CELLS BLD QL SMEAR: PRESENT — SIGNIFICANT CHANGE UP
CA-I SERPL-SCNC: 1.25 MMOL/L — SIGNIFICANT CHANGE UP (ref 1.12–1.3)
CALCIUM SERPL-MCNC: 9.4 MG/DL — SIGNIFICANT CHANGE UP (ref 8.4–10.5)
CHLORIDE BLDV-SCNC: 114 MMOL/L — HIGH (ref 96–108)
CHLORIDE SERPL-SCNC: 109 MMOL/L — HIGH (ref 96–108)
CO2 BLDV-SCNC: 24 MMOL/L — SIGNIFICANT CHANGE UP (ref 22–30)
CO2 SERPL-SCNC: 20 MMOL/L — LOW (ref 22–31)
COLOR SPEC: YELLOW — SIGNIFICANT CHANGE UP
CREAT SERPL-MCNC: 1.82 MG/DL — HIGH (ref 0.5–1.3)
DACRYOCYTES BLD QL SMEAR: SLIGHT — SIGNIFICANT CHANGE UP
DIFF PNL FLD: NEGATIVE — SIGNIFICANT CHANGE UP
ELLIPTOCYTES BLD QL SMEAR: SLIGHT — SIGNIFICANT CHANGE UP
EOSINOPHIL # BLD AUTO: 0.1 K/UL — SIGNIFICANT CHANGE UP (ref 0–0.5)
EOSINOPHIL NFR BLD AUTO: 2 % — SIGNIFICANT CHANGE UP (ref 0–6)
EPI CELLS # UR: 2 — SIGNIFICANT CHANGE UP
GAS PNL BLDV: 139 MMOL/L — SIGNIFICANT CHANGE UP (ref 136–145)
GAS PNL BLDV: SIGNIFICANT CHANGE UP
GAS PNL BLDV: SIGNIFICANT CHANGE UP
GLUCOSE BLDC GLUCOMTR-MCNC: 151 MG/DL — HIGH (ref 70–99)
GLUCOSE BLDC GLUCOMTR-MCNC: 171 MG/DL — HIGH (ref 70–99)
GLUCOSE BLDC GLUCOMTR-MCNC: 204 MG/DL — HIGH (ref 70–99)
GLUCOSE BLDC GLUCOMTR-MCNC: 232 MG/DL — HIGH (ref 70–99)
GLUCOSE BLDC GLUCOMTR-MCNC: 267 MG/DL — HIGH (ref 70–99)
GLUCOSE BLDC GLUCOMTR-MCNC: 317 MG/DL — HIGH (ref 70–99)
GLUCOSE BLDV-MCNC: 104 MG/DL — HIGH (ref 70–99)
GLUCOSE SERPL-MCNC: 106 MG/DL — HIGH (ref 70–99)
GLUCOSE UR QL: NEGATIVE — SIGNIFICANT CHANGE UP
HCO3 BLDV-SCNC: 23 MMOL/L — SIGNIFICANT CHANGE UP (ref 21–29)
HCT VFR BLD CALC: 37.2 % — LOW (ref 39–50)
HCT VFR BLDA CALC: 35 % — LOW (ref 39–50)
HGB BLD CALC-MCNC: 11.4 G/DL — LOW (ref 13–17)
HGB BLD-MCNC: 11.9 G/DL — LOW (ref 13–17)
KETONES UR-MCNC: ABNORMAL
LACTATE BLDV-MCNC: 2.2 MMOL/L — HIGH (ref 0.7–2)
LEUKOCYTE ESTERASE UR-ACNC: NEGATIVE — SIGNIFICANT CHANGE UP
LYMPHOCYTES # BLD AUTO: 0.6 K/UL — LOW (ref 1–3.3)
LYMPHOCYTES # BLD AUTO: 15 % — SIGNIFICANT CHANGE UP (ref 13–44)
MAGNESIUM SERPL-MCNC: 2 MG/DL — SIGNIFICANT CHANGE UP (ref 1.6–2.6)
MCHC RBC-ENTMCNC: 23.6 PG — LOW (ref 27–34)
MCHC RBC-ENTMCNC: 32.1 GM/DL — SIGNIFICANT CHANGE UP (ref 32–36)
MCV RBC AUTO: 73.5 FL — LOW (ref 80–100)
MICROCYTES BLD QL: SLIGHT — SIGNIFICANT CHANGE UP
MONOCYTES # BLD AUTO: 0.4 K/UL — SIGNIFICANT CHANGE UP (ref 0–0.9)
MONOCYTES NFR BLD AUTO: 11 % — SIGNIFICANT CHANGE UP (ref 2–14)
NEUTROPHILS # BLD AUTO: 2.9 K/UL — SIGNIFICANT CHANGE UP (ref 1.8–7.4)
NEUTROPHILS NFR BLD AUTO: 72 % — SIGNIFICANT CHANGE UP (ref 43–77)
NITRITE UR-MCNC: NEGATIVE — SIGNIFICANT CHANGE UP
OVALOCYTES BLD QL SMEAR: SLIGHT — SIGNIFICANT CHANGE UP
PCO2 BLDV: 44 MMHG — SIGNIFICANT CHANGE UP (ref 35–50)
PH BLDV: 7.34 — LOW (ref 7.35–7.45)
PH UR: 5.5 — SIGNIFICANT CHANGE UP (ref 5–8)
PHOSPHATE SERPL-MCNC: 3.6 MG/DL — SIGNIFICANT CHANGE UP (ref 2.5–4.5)
PLAT MORPH BLD: NORMAL — SIGNIFICANT CHANGE UP
PLATELET # BLD AUTO: 183 K/UL — SIGNIFICANT CHANGE UP (ref 150–400)
PO2 BLDV: 52 MMHG — HIGH (ref 25–45)
POIKILOCYTOSIS BLD QL AUTO: SLIGHT — SIGNIFICANT CHANGE UP
POLYCHROMASIA BLD QL SMEAR: SLIGHT — SIGNIFICANT CHANGE UP
POTASSIUM BLDV-SCNC: 3.6 MMOL/L — SIGNIFICANT CHANGE UP (ref 3.5–5.3)
POTASSIUM SERPL-MCNC: 4 MMOL/L — SIGNIFICANT CHANGE UP (ref 3.5–5.3)
POTASSIUM SERPL-SCNC: 4 MMOL/L — SIGNIFICANT CHANGE UP (ref 3.5–5.3)
PROT SERPL-MCNC: 6.7 G/DL — SIGNIFICANT CHANGE UP (ref 6–8.3)
PROT UR-MCNC: ABNORMAL
RBC # BLD: 5.06 M/UL — SIGNIFICANT CHANGE UP (ref 4.2–5.8)
RBC # FLD: 15.5 % — HIGH (ref 10.3–14.5)
RBC BLD AUTO: ABNORMAL
RBC CASTS # UR COMP ASSIST: 2 /HPF — SIGNIFICANT CHANGE UP (ref 0–4)
SAO2 % BLDV: 83 % — SIGNIFICANT CHANGE UP (ref 67–88)
SCHISTOCYTES BLD QL AUTO: SLIGHT — SIGNIFICANT CHANGE UP
SODIUM SERPL-SCNC: 142 MMOL/L — SIGNIFICANT CHANGE UP (ref 135–145)
SP GR SPEC: 1.03 — HIGH (ref 1.01–1.02)
TACROLIMUS SERPL-MCNC: 7.1 NG/ML — SIGNIFICANT CHANGE UP
TARGETS BLD QL SMEAR: SLIGHT — SIGNIFICANT CHANGE UP
UROBILINOGEN FLD QL: NEGATIVE — SIGNIFICANT CHANGE UP
WBC # BLD: 4.1 K/UL — SIGNIFICANT CHANGE UP (ref 3.8–10.5)
WBC # FLD AUTO: 4.1 K/UL — SIGNIFICANT CHANGE UP (ref 3.8–10.5)
WBC UR QL: 3 /HPF — SIGNIFICANT CHANGE UP (ref 0–5)

## 2019-03-29 PROCEDURE — 99285 EMERGENCY DEPT VISIT HI MDM: CPT | Mod: 25

## 2019-03-29 PROCEDURE — 99223 1ST HOSP IP/OBS HIGH 75: CPT

## 2019-03-29 RX ORDER — DEXTROSE 50 % IN WATER 50 %
25 SYRINGE (ML) INTRAVENOUS ONCE
Qty: 0 | Refills: 0 | Status: DISCONTINUED | OUTPATIENT
Start: 2019-03-29 | End: 2019-04-01

## 2019-03-29 RX ORDER — GLUCAGON INJECTION, SOLUTION 0.5 MG/.1ML
1 INJECTION, SOLUTION SUBCUTANEOUS ONCE
Qty: 0 | Refills: 0 | Status: DISCONTINUED | OUTPATIENT
Start: 2019-03-29 | End: 2019-04-01

## 2019-03-29 RX ORDER — ASPIRIN/CALCIUM CARB/MAGNESIUM 324 MG
81 TABLET ORAL DAILY
Qty: 0 | Refills: 0 | Status: DISCONTINUED | OUTPATIENT
Start: 2019-03-29 | End: 2019-04-01

## 2019-03-29 RX ORDER — TAMSULOSIN HYDROCHLORIDE 0.4 MG/1
0.4 CAPSULE ORAL AT BEDTIME
Qty: 0 | Refills: 0 | Status: DISCONTINUED | OUTPATIENT
Start: 2019-03-29 | End: 2019-04-01

## 2019-03-29 RX ORDER — DEXTROSE 50 % IN WATER 50 %
15 SYRINGE (ML) INTRAVENOUS ONCE
Qty: 0 | Refills: 0 | Status: DISCONTINUED | OUTPATIENT
Start: 2019-03-29 | End: 2019-04-01

## 2019-03-29 RX ORDER — INSULIN GLARGINE 100 [IU]/ML
5 INJECTION, SOLUTION SUBCUTANEOUS AT BEDTIME
Qty: 0 | Refills: 0 | Status: DISCONTINUED | OUTPATIENT
Start: 2019-03-29 | End: 2019-04-01

## 2019-03-29 RX ORDER — LABETALOL HCL 100 MG
300 TABLET ORAL THREE TIMES A DAY
Qty: 0 | Refills: 0 | Status: DISCONTINUED | OUTPATIENT
Start: 2019-03-29 | End: 2019-04-01

## 2019-03-29 RX ORDER — DOCUSATE SODIUM 100 MG
100 CAPSULE ORAL DAILY
Qty: 0 | Refills: 0 | Status: DISCONTINUED | OUTPATIENT
Start: 2019-03-29 | End: 2019-04-01

## 2019-03-29 RX ORDER — INSULIN ASPART 100 [IU]/ML
5 INJECTION, SOLUTION SUBCUTANEOUS
Qty: 0 | Refills: 0 | COMMUNITY

## 2019-03-29 RX ORDER — MYCOPHENOLATE MOFETIL 250 MG/1
250 CAPSULE ORAL
Qty: 0 | Refills: 0 | Status: DISCONTINUED | OUTPATIENT
Start: 2019-03-29 | End: 2019-04-01

## 2019-03-29 RX ORDER — INSULIN LISPRO 100/ML
VIAL (ML) SUBCUTANEOUS
Qty: 0 | Refills: 0 | Status: DISCONTINUED | OUTPATIENT
Start: 2019-03-29 | End: 2019-04-01

## 2019-03-29 RX ORDER — DOCUSATE SODIUM 100 MG
2 CAPSULE ORAL
Qty: 0 | Refills: 0 | COMMUNITY

## 2019-03-29 RX ORDER — SODIUM CHLORIDE 9 MG/ML
1000 INJECTION, SOLUTION INTRAVENOUS
Qty: 0 | Refills: 0 | Status: DISCONTINUED | OUTPATIENT
Start: 2019-03-29 | End: 2019-04-01

## 2019-03-29 RX ORDER — NIFEDIPINE 30 MG
60 TABLET, EXTENDED RELEASE 24 HR ORAL DAILY
Qty: 0 | Refills: 0 | Status: DISCONTINUED | OUTPATIENT
Start: 2019-03-29 | End: 2019-04-01

## 2019-03-29 RX ORDER — TACROLIMUS 5 MG/1
3 CAPSULE ORAL EVERY 12 HOURS
Qty: 0 | Refills: 0 | Status: DISCONTINUED | OUTPATIENT
Start: 2019-03-29 | End: 2019-04-01

## 2019-03-29 RX ORDER — CALCIUM CARBONATE 500(1250)
1 TABLET ORAL DAILY
Qty: 0 | Refills: 0 | Status: DISCONTINUED | OUTPATIENT
Start: 2019-03-29 | End: 2019-04-01

## 2019-03-29 RX ORDER — CHOLECALCIFEROL (VITAMIN D3) 125 MCG
0 CAPSULE ORAL
Qty: 0 | Refills: 0 | COMMUNITY

## 2019-03-29 RX ORDER — INSULIN LISPRO 100/ML
VIAL (ML) SUBCUTANEOUS AT BEDTIME
Qty: 0 | Refills: 0 | Status: DISCONTINUED | OUTPATIENT
Start: 2019-03-29 | End: 2019-04-01

## 2019-03-29 RX ORDER — DEXTROSE 50 % IN WATER 50 %
12.5 SYRINGE (ML) INTRAVENOUS ONCE
Qty: 0 | Refills: 0 | Status: DISCONTINUED | OUTPATIENT
Start: 2019-03-29 | End: 2019-04-01

## 2019-03-29 RX ORDER — CALCIUM CARBONATE 500(1250)
1 TABLET ORAL
Qty: 0 | Refills: 0 | COMMUNITY

## 2019-03-29 RX ORDER — HEPARIN SODIUM 5000 [USP'U]/ML
5000 INJECTION INTRAVENOUS; SUBCUTANEOUS EVERY 8 HOURS
Qty: 0 | Refills: 0 | Status: DISCONTINUED | OUTPATIENT
Start: 2019-03-29 | End: 2019-04-01

## 2019-03-29 RX ORDER — ATORVASTATIN CALCIUM 80 MG/1
80 TABLET, FILM COATED ORAL AT BEDTIME
Qty: 0 | Refills: 0 | Status: DISCONTINUED | OUTPATIENT
Start: 2019-03-29 | End: 2019-04-01

## 2019-03-29 RX ADMIN — ATORVASTATIN CALCIUM 80 MILLIGRAM(S): 80 TABLET, FILM COATED ORAL at 23:41

## 2019-03-29 RX ADMIN — Medication 3: at 18:22

## 2019-03-29 RX ADMIN — Medication 300 MILLIGRAM(S): at 23:39

## 2019-03-29 RX ADMIN — Medication 5 MILLIGRAM(S): at 13:59

## 2019-03-29 RX ADMIN — TAMSULOSIN HYDROCHLORIDE 0.4 MILLIGRAM(S): 0.4 CAPSULE ORAL at 23:41

## 2019-03-29 RX ADMIN — TACROLIMUS 3 MILLIGRAM(S): 5 CAPSULE ORAL at 18:21

## 2019-03-29 RX ADMIN — Medication 2: at 23:38

## 2019-03-29 RX ADMIN — INSULIN GLARGINE 5 UNIT(S): 100 INJECTION, SOLUTION SUBCUTANEOUS at 23:41

## 2019-03-29 RX ADMIN — HEPARIN SODIUM 5000 UNIT(S): 5000 INJECTION INTRAVENOUS; SUBCUTANEOUS at 14:25

## 2019-03-29 RX ADMIN — HEPARIN SODIUM 5000 UNIT(S): 5000 INJECTION INTRAVENOUS; SUBCUTANEOUS at 23:39

## 2019-03-29 RX ADMIN — Medication 81 MILLIGRAM(S): at 14:25

## 2019-03-29 RX ADMIN — Medication 60 MILLIGRAM(S): at 13:59

## 2019-03-29 RX ADMIN — Medication 300 MILLIGRAM(S): at 13:59

## 2019-03-29 RX ADMIN — MYCOPHENOLATE MOFETIL 250 MILLIGRAM(S): 250 CAPSULE ORAL at 18:21

## 2019-03-29 NOTE — ED ADULT NURSE REASSESSMENT NOTE - NS ED NURSE REASSESS COMMENT FT1
As per Inpatient NP, safe to continue with oral Beta-blocker administration. Will continue to monitor.

## 2019-03-29 NOTE — H&P ADULT - PROBLEM SELECTOR PROBLEM 4
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

## 2019-03-29 NOTE — ED ADULT NURSE NOTE - NSIMPLEMENTINTERV_GEN_ALL_ED
Implemented All Fall Risk Interventions:  [place] to call system. Call bell, personal items and telephone within reach. Instruct patient to call for assistance. Room bathroom lighting operational. Non-slip footwear when patient is off stretcher. Physically safe environment: no spills, clutter or unnecessary equipment. Stretcher in lowest position, wheels locked, appropriate side rails in place. Provide visual cue, wrist band, yellow gown, etc. Monitor gait and stability. Monitor for mental status changes and reorient to person, place, and time. Review medications for side effects contributing to fall risk. Reinforce activity limits and safety measures with patient and family.

## 2019-03-29 NOTE — ED ADULT NURSE REASSESSMENT NOTE - NS ED NURSE REASSESS COMMENT FT1
Received report from MATT Bryan. Patient resting comfortably in bed with wife at the bedside. Patient a/ox4, VSS and in NAD. Patient denies any dizziness/lightheadesness/SOB. Lung sounds clear and equal b/l with no labored breathing noted. Patient awaiting bed assignment. Will continue to monitor and reassess.

## 2019-03-29 NOTE — H&P ADULT - NSICDXPASTSURGICALHX_GEN_ALL_CORE_FT
PAST SURGICAL HISTORY:  History of detached retina repair right eye    S/P kidney transplant 2011    S/P TURP

## 2019-03-29 NOTE — CONSULT NOTE ADULT - SUBJECTIVE AND OBJECTIVE BOX
CONSULT NOTE  --------------------------------------------------------------------------------  HPI: 63 years old AA male, came to ED with hypoglycemia. Patient said he might have mistakenly taken short acting insulin instead of the long acting one.  Currently feels back to himself. No fever, no urinary symptoms, no chest pain/abd/pain/vomiting/diarrhea.  He identifies insulin by size of the insulin pen and measures dose by the clicks on the pen.    He is followd by Dr. Rolon for endocrinology care [place]    Has had recent hospitalization at [facility] with Coag neg staph bacteremia, treated with Vanco on discharge (3/18) until 3/21 and central line removed on 3/28.    Transplant Hx: Patient received DDRT in 2011 ([facility], Surgeon Rian Whitfield, 43 years old kidney, had DGF,) ESRD from DM, HTN, foll by Dr. Jessica segura;le on dialysis.  Patient has had f/u scheduled on discharge.  Immunosuppression: Was on Tac 4/4, decreased to 3/3 during last admission.  twice daily and prednisone 5 /day  On Levemir and Novolog; On Nifedipine XL 60/day, Labetalol 300X 3/day and Flomax 0.4 mg/day    Reports previously on 'water pills' not on anymore since last admission    Denies any h/o seizures in the past. Has blindness from diabetic retinopathy, No h/o CAD.  Lives with wife Esperanza and their son and family in [place]  He is a retired [place] .      Reviewed prior admission records and reviewed history with patient.    PAST HISTORY  --------------------------------------------------------------------------------  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 and limited vision in left eye  Bell's palsy: 2004  ESRD (end stage renal disease): s/p renal transplant  Type 2 diabetes mellitus  Obesity  Hypertension  Dyslipidemia  Diabetic neuropathy associated with type 2 diabetes mellitus  Benign prostatic hypertrophy  History of detached retina repair: right eye  S/P TURP  S/P kidney transplant: 2011    FAMILY HISTORY:  No pertinent family history in first degree relatives    PAST SOCIAL HISTORY:    ALLERGIES & MEDICATIONS  --------------------------------------------------------------------------------  Allergies    No Known Drug Allergies  Seafood (Pruritus; Rash)    Intolerances      Standing Inpatient Medications  aspirin enteric coated 81 milliGRAM(s) Oral daily  atorvastatin 80 milliGRAM(s) Oral at bedtime  calcium carbonate   1250 mG (OsCal) 1 Tablet(s) Oral daily  dextrose 5%. 1000 milliLiter(s) IV Continuous <Continuous>  dextrose 50% Injectable 12.5 Gram(s) IV Push once  dextrose 50% Injectable 25 Gram(s) IV Push once  dextrose 50% Injectable 25 Gram(s) IV Push once  heparin  Injectable 5000 Unit(s) SubCutaneous every 8 hours  insulin lispro (HumaLOG) corrective regimen sliding scale   SubCutaneous three times a day before meals  insulin lispro (HumaLOG) corrective regimen sliding scale   SubCutaneous at bedtime  labetalol 300 milliGRAM(s) Oral three times a day  mycophenolate mofetil 250 milliGRAM(s) Oral two times a day  NIFEdipine XL 60 milliGRAM(s) Oral daily  predniSONE   Tablet 5 milliGRAM(s) Oral daily  tacrolimus 3 milliGRAM(s) Oral every 12 hours  tamsulosin 0.4 milliGRAM(s) Oral at bedtime    PRN Inpatient Medications  dextrose 40% Gel 15 Gram(s) Oral once PRN  docusate sodium 100 milliGRAM(s) Oral daily PRN  glucagon  Injectable 1 milliGRAM(s) IntraMuscular once PRN      REVIEW OF SYSTEMS  --------------------------------------------------------------------------------  Gen: No weight changes, fatigue, fevers/chills, weakness  Skin: No rashes  Head/Eyes/Ears/Mouth: No headache; Normal hearing; Normal vision w/o blurriness; No sinus pain/discomfort, sore throat  Respiratory: No dyspnea, cough, wheezing, hemoptysis  CV: No chest pain, PND, orthopnea  GI: No abdominal pain, diarrhea, constipation, nausea, vomiting, melena, hematochezia  : No increased frequency, dysuria, hematuria, nocturia  MSK: No joint pain/swelling; no back pain;Neuro: as noted   Heme: No easy bruising or bleeding  Psych: No significant nervousness, anxiety, stress, depression    All other systems were reviewed and are negative, except as noted.    VITALS/PHYSICAL EXAM  --------------------------------------------------------------------------------  T(C): 36.4 (03-29-19 @ 12:30), Max: 36.7 (03-29-19 @ 02:31)  HR: 64 (03-29-19 @ 12:30) (61 - 98)  BP: 166/91 (03-29-19 @ 12:30) (135/88 - 176/94)  RR: 18 (03-29-19 @ 12:30) (16 - 18)  SpO2: 100% (03-29-19 @ 12:30) (97% - 100%)          Physical Exam:  	Gen: NAD, Not dyspnoeic  	HEENT: PERRL, supple neck, clear oropharynx  	Pulm: CTA B/L; Left subclavian central line removal site- no acute signs  	CV: RRR, S1S2; no rub  	Back: No spinal or CVA tenderness; no sacral edema  	Abd: +BS, soft, nontender/nondistended                      Transplant: RLQ, non tender, no bruit  	: No suprapubic tenderness  	UE: Warm, FROM, no clubbing, intact strength; no edema; no asterixis  	LE: Warm, FROM, no clubbing, intact strength; trace ankle edema  	Neuro: Diminished vision bilaterally, minimal vision on left. No facial assymmetry, bilateral equal strength of upper and lower extremities.  	Psych: Normal affect and mood  	Skin: Warm, without rashes  	Vascular access: No functioning access    LABS/STUDIES  --------------------------------------------------------------------------------              11.9   4.1   >-----------<  183      [03-29-19 @ 02:38]              37.2     142  |  109  |  22  ----------------------------<  106      [03-29-19 @ 02:39]  4.0   |  20  |  1.82        Ca     9.4     [03-29-19 @ 02:39]      Mg     2.0     [03-29-19 @ 02:39]      Phos  3.6     [03-29-19 @ 02:39]    TPro  6.7  /  Alb  3.9  /  TBili  0.2  /  DBili  x   /  AST  24  /  ALT  33  /  AlkPhos  59  [03-29-19 @ 02:39]          Creatinine Trend:  SCr 1.82 [03-29 @ 02:39]  SCr 2.09 [03-18 @ 07:54]  SCr 1.86 [03-17 @ 05:52]  SCr 1.98 [03-16 @ 07:04]  SCr 2.10 [03-15 @ 05:31]    Urinalysis - [03-29-19 @ 12:16]      Color Yellow / Appearance Clear / SG 1.029 / pH 5.5      Gluc Negative / Ketone Small  / Bili Negative / Urobili Negative       Blood Negative / Protein 30 mg/dL / Leuk Est Negative / Nitrite Negative      RBC 2 / WBC 3 / Hyaline  / Gran  / Sq Epi  / Non Sq Epi 2 / Bacteria Negative      HbA1c 9.2      [03-09-19 @ 10:23]    HCV 0.06, Nonreact      [03-09-19 @ 14:44]      TacrolimusTacrolimus (), Serum: 7.1 ng/mL (03-29 @ 06:33)

## 2019-03-29 NOTE — H&P ADULT - NSICDXPASTMEDICALHX_GEN_ALL_CORE_FT
PAST MEDICAL HISTORY:  Bell's palsy 2004    Benign prostatic hypertrophy     Diabetic neuropathy associated with type 2 diabetes mellitus     Dyslipidemia     ESRD (end stage renal disease) s/p renal transplant    Hypertension     Legally blind blind in right eye and limited vision in left eye    Obesity     Type 2 diabetes mellitus

## 2019-03-29 NOTE — ED PROVIDER NOTE - CARE PLAN
Principal Discharge DX:	Hypoglycemia Principal Discharge DX:	Hypoglycemia  Secondary Diagnosis:	Seizure  Secondary Diagnosis:	Type 2 diabetes mellitus

## 2019-03-29 NOTE — ED ADULT NURSE NOTE - OBJECTIVE STATEMENT
62 YO male PMHx DM, HTN, HLD, ESRD s/p kidney transplant in 2011 BIBEMS s/p seizure lasting 15 minutes. Patient states "I normally take 10 units of novalog before meals and 10 mg of Levemir at bedtime. Today, I noticed that my blood sugar was in the 220's after my meal so I took 5 more units of the novalog and went to bed. I don't remember anything after that, just waking up to a lot of people surrounding me telling me I had a seizure". Patient wife at bedside states "while we were sleeping, I felt him start to shake. His entire body was shaking and the seizure might have lasted around 15 minutes." Patient is A&OX3, PERRL, equal strength and sensation in all extremities. no loss of bowel or bladder during or after seizure. denies chest pain, SOB, HA, N/V/D, abdominal pain, fever/chills, urinary symptoms, hematuria, weakness, dizziness, numbness, tinging. Peripheral pulses present b/l. Skin warm, dry and pink. Pt placed in position of comfort. Pt educated on call bell system and provided call mariee. Pt denies needs at this time.

## 2019-03-29 NOTE — H&P ADULT - ASSESSMENT
63 year old male with history of DM Type II, diabetic retinopathy, s/p kidney transplant in 2011, recent admission and discharge from [facility] for high grade coag neg staph bacteremia and pyelonephritis (s/p 2 week course of vancomycin, recently completed, PICC removed 2 days ago), presenting from home with seizure in the setting of hypoglycemia.

## 2019-03-29 NOTE — H&P ADULT - PROBLEM SELECTOR PLAN 3
-Cr today is at baseline, ~1.6-1.8 as per renal note from prior admission   -Continue with tacro and prednisone   -will consult transplant renal   -tacro level of 7.1

## 2019-03-29 NOTE — ED PROVIDER NOTE - NS ED ROS FT
Constitutional: no fever  Eyes: no conjunctivitis  Ears: no ear pain   Nose: no nasal congestion, Mouth/Throat: no throat pain, Neck: no stiffness  Cardiovascular: no chest pain  Chest: no cough  Gastrointestinal: no abdominal pain, no vomiting and diarrhea  MSK: no joint pain  : no dysuria  Skin: no rash  Neuro: +sz, +jerking movement, +confusion

## 2019-03-29 NOTE — ED PROVIDER NOTE - CLINICAL SUMMARY MEDICAL DECISION MAKING FREE TEXT BOX
63 M with  AMS and sz in the setting of hypoglycemia, likely due to overdose on insulin in setting of minimal PO intake recently during his dinner. Plan: Labs, q1h FSG, likely admissino for observation and re-education of DM

## 2019-03-29 NOTE — ED PROVIDER NOTE - ATTENDING CONTRIBUTION TO CARE
wdas taken off his long acting levimir at night during most recent hospitalization due to low blood sugar, but started it again upon discharge despite his discharge med rec stating to stop.   also gave himself an extra 5 units novolog after going out to dinner as his fsg was still high.   just had line removed yest for his iv vanco, no fever or chills.   was seizing x 20 min at home due to very low fsg. 63 yom pmhx dm, diabetic retinopathy and is legally blind, kidney transplant due to esrd in 2011, discharge summary reviewed from most recent hospitalization - just discharged few days ago after staph bacteremia likely due to pyelonephritis , discharged on picc line vanco and just completed recently, picc line was removed 2 days ago, presents from home with seizure. as per family who has discharge summary at bedside, pt presumably was supposed to be taken off his long acting levimir at time of discharge. pt went to [employer] last night for dinner and had breadsticks/ ravioli - felt his sugar was high so took an extra novolog 5 units and then took 10 units long acting levimir. pt states is not 100% sure he was accurate in administration as he is blind and listents to clicks to admin.   in middle of night wife awoke to him shaking - had 20 min siezure until EMS arrival. at time of ED eval is back at mental status baseline. pt states he was feeling well prior to this  episode     ROS:   constitutional - no fever, no chills  eyes - no visual changes, no redness  eent - no sore throat, no nasal congestion  cvs - no chest pain, no leg swelling  resp - no shortness of breath, no cough  gi - no abdominal pain, no vomiting, no diarrhea  gu - no dysuria, no hematuria  msk - no acute back pain, no joint swelling  skin - no rashes, no jaundice  neuro - no headache, no focal weakness  psych - no acute mental health issue     Physical Exam:   constitutional - well appearing, awake and alert, oriented x3, blind.   head - no external evidence of trauma  eent - no conjunctival injection or icterus, mucous membranes moist   cvs - rrr, no murmurs, no peripheral edema  resp - breath sounds clear and equal bilat, normal respiratory effort  gi - abdomen soft and nontender, no rigidity, guarding or rebound, bowel sounds present  msk - moving all extremities spontaneously, gait is at baseline for patient  neuro - alert and oriented x3, no focal deficits, CNs 2-12 grossly intact  skin- no jaundice, warm , diaphoretic   psych - mood and affect wnl, no apparent risk to self or others     question overdose or re- initiation of long acting med that pt is not supposed to be on.   will admit for DM re-education and endocrine eval to devise appropriate regimen. SILVIA Gale MD

## 2019-03-29 NOTE — H&P ADULT - PROBLEM SELECTOR PLAN 1
-due to hypoglycemia, now resolved  -continue to manage hypoglycemia as below  -neurochecks q4hrs for now

## 2019-03-29 NOTE — ED PROVIDER NOTE - PHYSICAL EXAMINATION
General: Alert and Oriented. No apparent distress.  Head: Normocephalic and atraumatic.  Eyes: PERRLA with EOMI.  Neck: Supple. Trachea midline.   Cardiac: Normal S1 and S2 w/ RRR. No murmurs appreciated.   Pulmonary: Vesicular breath sounds bilaterally. No increased WOB. No wheezes or crackles.  Abdominal: Soft, non-tender. Normoactive bowel sounds.  Neurologic: No focal sensory or motor deficits. Vision intact to immediate visual field w/ fingers.  Musculoskeletal: Strength appropriate in all 4 extremities for age with no limited ROM.  Skin: Color appropriate for race. Intact, warm, and well-perfused.

## 2019-03-29 NOTE — CONSULT NOTE ADULT - SUBJECTIVE AND OBJECTIVE BOX
Reason For Consult: DM    HPI:  63 year old male with history of DM Type II, diabetic retinopathy, s/p kidney transplant in 2011, recent admission and discharge from [facility] for high grade coag neg staph bacteremia and pyelonephritis (s/p 2 week course of vancomycin, recently completed, PICC removed 2 days ago), presenting from home with seizure in the setting of hypoglycemia. The patient states that his blood glucose levels were running high after his discharge from the hospital. He went to eat at [employer] yesterday, and he had only salad and breadsticks from there along with 4 pieces of ravioli. His glucose when he got home was 226. He took 5 units of novolog at that time. He had antonino crackers, jello, and peaches before bed. And he took his usual 10 units of levemir. However, the patient states that he is blind and that he may have mistaken the novolog for the levemir. He usually measures out the insulin by listening for the clicks of the pen, and he can tell the 2 insulin pens apart by their sizes/shape. After taking the insulin he fell asleep and his wife woke up when she noticed that he was trembling. She tried to wake him up but was unsuccessful. She noticed that he was trembling all over. A family member called 911, and EMS revived him with D50 after they arrived on scene. He gained consciousness immediately after receiving the D50 per family.     He denies any weakness, numbness/tingling, fevers, chills, night sweats, dysuria, night sweats, chest pain, chest pressure, light headedness, dizziness.     He currently feels well. No other complaints.     He usually takes 10 units of novolog pre meals (has been doing it 2x day recently), and 10 units levemir at bedtime. (29 Mar 2019 10:07)        Endocrine called for further assistance:  pt notes a long standing hx of DM >20 years.   Currently on Levemir 10 units and Novolog 10 units before meals.  Pt recently has been trying to modify his diet after last hospitalization  Please note, reviewe of the last hopsitlzation reviews that pt had hypoglycemia inpt and at the end of hospitalization the the Lantus was held.  [place] endocrine [place]  Pt reports microvascular complications of nephropathy and retinopathy, pt is also legally blind.  pt states he gives himself insulin via "clicks" and also the feel of the pen, stating the levemir if thicker.  He does not remember last nights episode, but believes he took the Novolog instead of the levemir and got hypoglycemic.  pt also notes that his glucometer (apepars to be prodigy glucometer as it will state the number outloud) has not been working, usually wife will check the FSBG and the pt will inject the insulin.    pt is eating well at the time of the exam.         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 and limited vision in left eye  Bell's palsy: 2004  ESRD (end stage renal disease): s/p renal transplant  Type 2 diabetes mellitus  Obesity  Hypertension  Dyslipidemia  Diabetic neuropathy associated with type 2 diabetes mellitus  Benign prostatic hypertrophy  History of detached retina repair: right eye  S/P TURP  S/P kidney transplant: 2011      FAMILY HISTORY:  No pertinent family history in first degree relatives      Social History:  No illcits lives with wife     Outpatient Medications:  Home Medications:   * Incomplete Medication History as of 18-Mar-2019 15:53 documented in Structured Notes  · 	tacrolimus 1 mg oral capsule: 3 cap(s) orally every 12 hours  · 	labetalol 300 mg oral tablet: 1 tab(s) orally 3 times a day  · 	tamsulosin 0.4 mg oral capsule: 1 cap(s) orally once a day (at bedtime)  · 	CBC, CMP, STAT on Friday 3/22/19. Fax results to Dr. Dias, [phone]:   · 	vancomycin 1.25 g/150 mL-NaCl 0.9% intravenous solution: 1.25 gram(s) intravenous daily  	Last dose on 3/21/19  	  · 	aspirin 81 mg oral delayed release tablet: 1 tab(s) orally once a day  · 	docusate sodium 100 mg oral capsule: 2 cap(s) orally once a day (in the morning)  · 	rosuvastatin 20 mg oral tablet: 1 tab(s) orally once a day (at bedtime)  · 	Vitamin D3:   · 	NovoLOG FlexPen 100 units/mL subcutaneous solution: 5 unit(s) subcutaneous 3 times a day (before meals)  · 	mycophenolate mofetil 250 mg oral capsule: 1 cap(s) orally 2 times a day  · 	Nifedical XL 60 mg oral tablet, extended release: 1 tab(s) orally once a day  · 	predniSONE 5 mg oral tablet: 1 tab(s) orally once a day  · 	Oysco 500 (1250 mg calcium carbonate) oral tablet: 1 tab(s) orally 2 times a day    MEDICATIONS  (STANDING):  aspirin enteric coated 81 milliGRAM(s) Oral daily  atorvastatin 80 milliGRAM(s) Oral at bedtime  calcium carbonate   1250 mG (OsCal) 1 Tablet(s) Oral daily  dextrose 5%. 1000 milliLiter(s) (50 mL/Hr) IV Continuous <Continuous>  dextrose 50% Injectable 12.5 Gram(s) IV Push once  dextrose 50% Injectable 25 Gram(s) IV Push once  dextrose 50% Injectable 25 Gram(s) IV Push once  heparin  Injectable 5000 Unit(s) SubCutaneous every 8 hours  labetalol 300 milliGRAM(s) Oral three times a day  mycophenolate mofetil 250 milliGRAM(s) Oral two times a day  NIFEdipine XL 60 milliGRAM(s) Oral daily  predniSONE   Tablet 5 milliGRAM(s) Oral daily  tacrolimus 3 milliGRAM(s) Oral every 12 hours  tamsulosin 0.4 milliGRAM(s) Oral at bedtime    MEDICATIONS  (PRN):  dextrose 40% Gel 15 Gram(s) Oral once PRN Blood Glucose LESS THAN 70 milliGRAM(s)/deciliter  docusate sodium 100 milliGRAM(s) Oral daily PRN Constipation  glucagon  Injectable 1 milliGRAM(s) IntraMuscular once PRN Glucose LESS THAN 70 milligrams/deciliter      Allergies    No Known Drug Allergies  Seafood (Pruritus; Rash)    Intolerances      Review of Systems:  Constitutional: No fever  Eyes: No blurry vision  Neuro: No tremors  HEENT: No pain  Cardiovascular: No chest pain, palpitations  Respiratory: No SOB, no cough  GI: No nausea, vomiting, abdominal pain  : No dysuria  Skin: no rash  Psych: no depression  Endocrine: no polyuria, polydipsia  Hem/lymph: no swelling  Osteoporosis: no fractures    ALL OTHER SYSTEMS REVIEWED AND NEGATIVE        PHYSICAL EXAM:  VITALS: T(C): 36.4 (03-29-19 @ 12:30)  T(F): 97.6 (03-29-19 @ 12:30), Max: 98 (03-29-19 @ 02:31)  HR: 64 (03-29-19 @ 12:30) (61 - 98)  BP: 166/91 (03-29-19 @ 12:30) (135/88 - 176/94)  RR:  (16 - 18)  SpO2:  (97% - 100%)  Wt(kg): --  GENERAL: NAD, well-groomed, well-developed  EYES: No proptosis, no lid lag, anicteric  HEENT:  Atraumatic, Normocephalic, moist mucous membranes  RESPIRATORY: Clear to auscultation bilaterally; No rales, rhonchi, wheezing, or rubs  CARDIOVASCULAR: Regular rate and rhythm; No murmurs; no peripheral edema  GI: Soft, nontender, non distended, normal bowel sounds  SKIN: Dry, intact, No rashes or lesions  MUSCULOSKELETAL: Full range of motion, normal strength  NEURO: sensation intact, extraocular movements intact, no tremor, normal reflexes  PSYCH: Alert and oriented x 3, normal affect, normal mood    POCT Blood Glucose.: 204 mg/dL (03-29-19 @ 14:18)  POCT Blood Glucose.: 232 mg/dL (03-29-19 @ 13:04)  POCT Blood Glucose.: 171 mg/dL (03-29-19 @ 09:45)  POCT Blood Glucose.: 125 mg/dL (03-29-19 @ 05:15)  POCT Blood Glucose.: 129 mg/dL (03-29-19 @ 04:02)  POCT Blood Glucose.: 123 mg/dL (03-29-19 @ 03:18)  POCT Blood Glucose.: 112 mg/dL (03-29-19 @ 02:44)  POCT Blood Glucose.: 103 mg/dL (03-29-19 @ 02:09)                            11.9   4.1   )-----------( 183      ( 29 Mar 2019 02:38 )             37.2       03-29    142  |  109<H>  |  22  ----------------------------<  106<H>  4.0   |  20<L>  |  1.82<H>    EGFR if : 44<L>  EGFR if non : 38<L>    Ca    9.4      03-29  Mg     2.0     03-29  Phos  3.6     03-29    TPro  6.7  /  Alb  3.9  /  TBili  0.2  /  DBili  x   /  AST  24  /  ALT  33  /  AlkPhos  59  03-29      Thyroid Function Tests:      Hemoglobin A1C, Whole Blood: 9.2 % <H> [4.0 - 5.6] (03-09-19 @ 10:23)          Radiology: Reason For Consult: DM    HPI:  63 year old male with history of DM Type II, diabetic retinopathy, s/p kidney transplant in 2011, recent admission and discharge from [facility] for high grade coag neg staph bacteremia and pyelonephritis (s/p 2 week course of vancomycin, recently completed, PICC removed 2 days ago), presenting from home with seizure in the setting of hypoglycemia. The patient states that his blood glucose levels were running high after his discharge from the hospital. He went to eat at [employer] yesterday, and he had only salad and breadsticks from there along with 4 pieces of ravioli. His glucose when he got home was 226. He took 5 units of novolog at that time. He had antonino crackers, jello, and peaches before bed. And he took his usual 10 units of levemir. However, the patient states that he is blind and that he may have mistaken the novolog for the levemir. He usually measures out the insulin by listening for the clicks of the pen, and he can tell the 2 insulin pens apart by their sizes/shape. After taking the insulin he fell asleep and his wife woke up when she noticed that he was trembling. She tried to wake him up but was unsuccessful. She noticed that he was trembling all over. A family member called 911, and EMS revived him with D50 after they arrived on scene. He gained consciousness immediately after receiving the D50 per family.     He denies any weakness, numbness/tingling, fevers, chills, night sweats, dysuria, night sweats, chest pain, chest pressure, light headedness, dizziness.     He currently feels well. No other complaints.     He usually takes 10 units of novolog pre meals (has been doing it 2x day recently), and 10 units levemir at bedtime. (29 Mar 2019 10:07)        Endocrine called for further assistance:  pt notes a long standing hx of DM >20 years.   Currently on Levemir 10 units and Novolog 10 units before meals.  Pt recently has been trying to modify his diet after last hospitalization  Please note, reviewe of the last hopsitlzation reviews that pt had hypoglycemia inpt and at the end of hospitalization the the Lantus was held.  Pt follows wit endocrine in [place]  Pt reports microvascular complications of nephropathy and retinopathy, pt is also legally blind.  pt states he gives himself insulin via "clicks" and also the feel of the pen, stating the levemir if thicker.  He does not remember last nights episode, but believes he took the Novolog instead of the levemir and got hypoglycemic.  pt also notes that his glucometer (apepars to be prodigy glucometer as it will state the number outloud) has not been working, usually wife will check the FSBG and the pt will inject the insulin.    pt is eating well at the time of the exam.     Labs notable for   bicarb 20   Cr 1.82,   AG 13  small urine ketones     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 and limited vision in left eye  Bell's palsy: 2004  ESRD (end stage renal disease): s/p renal transplant  Type 2 diabetes mellitus  Obesity  Hypertension  Dyslipidemia  Diabetic neuropathy associated with type 2 diabetes mellitus  Benign prostatic hypertrophy  History of detached retina repair: right eye  S/P TURP  S/P kidney transplant: 2011      FAMILY HISTORY:  No pertinent family history in first degree relatives      Social History:  No illcits lives with wife     Outpatient Medications:  Home Medications:   * Incomplete Medication History as of 18-Mar-2019 15:53 documented in Structured Notes  · 	tacrolimus 1 mg oral capsule: 3 cap(s) orally every 12 hours  · 	labetalol 300 mg oral tablet: 1 tab(s) orally 3 times a day  · 	tamsulosin 0.4 mg oral capsule: 1 cap(s) orally once a day (at bedtime)  · 	CBC, CMP, STAT on Friday 3/22/19. Fax results to Dr. Dias, [phone]:   · 	vancomycin 1.25 g/150 mL-NaCl 0.9% intravenous solution: 1.25 gram(s) intravenous daily  	Last dose on 3/21/19  	  · 	aspirin 81 mg oral delayed release tablet: 1 tab(s) orally once a day  · 	docusate sodium 100 mg oral capsule: 2 cap(s) orally once a day (in the morning)  · 	rosuvastatin 20 mg oral tablet: 1 tab(s) orally once a day (at bedtime)  · 	Vitamin D3:   · 	NovoLOG FlexPen 100 units/mL subcutaneous solution: 5 unit(s) subcutaneous 3 times a day (before meals)  · 	mycophenolate mofetil 250 mg oral capsule: 1 cap(s) orally 2 times a day  · 	Nifedical XL 60 mg oral tablet, extended release: 1 tab(s) orally once a day  · 	predniSONE 5 mg oral tablet: 1 tab(s) orally once a day  · 	Oysco 500 (1250 mg calcium carbonate) oral tablet: 1 tab(s) orally 2 times a day    MEDICATIONS  (STANDING):  aspirin enteric coated 81 milliGRAM(s) Oral daily  atorvastatin 80 milliGRAM(s) Oral at bedtime  calcium carbonate   1250 mG (OsCal) 1 Tablet(s) Oral daily  dextrose 5%. 1000 milliLiter(s) (50 mL/Hr) IV Continuous <Continuous>  dextrose 50% Injectable 12.5 Gram(s) IV Push once  dextrose 50% Injectable 25 Gram(s) IV Push once  dextrose 50% Injectable 25 Gram(s) IV Push once  heparin  Injectable 5000 Unit(s) SubCutaneous every 8 hours  labetalol 300 milliGRAM(s) Oral three times a day  mycophenolate mofetil 250 milliGRAM(s) Oral two times a day  NIFEdipine XL 60 milliGRAM(s) Oral daily  predniSONE   Tablet 5 milliGRAM(s) Oral daily  tacrolimus 3 milliGRAM(s) Oral every 12 hours  tamsulosin 0.4 milliGRAM(s) Oral at bedtime    MEDICATIONS  (PRN):  dextrose 40% Gel 15 Gram(s) Oral once PRN Blood Glucose LESS THAN 70 milliGRAM(s)/deciliter  docusate sodium 100 milliGRAM(s) Oral daily PRN Constipation  glucagon  Injectable 1 milliGRAM(s) IntraMuscular once PRN Glucose LESS THAN 70 milligrams/deciliter      Allergies    No Known Drug Allergies  Seafood (Pruritus; Rash)    Intolerances      Review of Systems:  Constitutional: No fever  Eyes: No blurry vision  Neuro: No tremors  HEENT: No pain  Cardiovascular: No chest pain, palpitations  Respiratory: No SOB, no cough  GI: No nausea, vomiting, abdominal pain  : No dysuria  Skin: no rash  Psych: no depression  Endocrine: no polyuria, polydipsia  Hem/lymph: no swelling  Osteoporosis: no fractures    ALL OTHER SYSTEMS REVIEWED AND NEGATIVE        PHYSICAL EXAM:  VITALS: T(C): 36.4 (03-29-19 @ 12:30)  T(F): 97.6 (03-29-19 @ 12:30), Max: 98 (03-29-19 @ 02:31)  HR: 64 (03-29-19 @ 12:30) (61 - 98)  BP: 166/91 (03-29-19 @ 12:30) (135/88 - 176/94)  RR:  (16 - 18)  SpO2:  (97% - 100%)  Wt(kg): --  GENERAL: NAD, well-groomed, well-developed  EYES: No proptosis, no lid lag, anicteric  HEENT:  Atraumatic, Normocephalic, moist mucous membranes  RESPIRATORY: Clear to auscultation bilaterally; No rales, rhonchi, wheezing, or rubs  CARDIOVASCULAR: Regular rate and rhythm; No murmurs; no peripheral edema  GI: Soft, nontender, non distended, normal bowel sounds  SKIN: Dry, intact, No rashes or lesions  MUSCULOSKELETAL: Full range of motion, normal strength  NEURO: sensation intact, extraocular movements intact, no tremor, normal reflexes  PSYCH: Alert and oriented x 3, normal affect, normal mood    POCT Blood Glucose.: 204 mg/dL (03-29-19 @ 14:18)  POCT Blood Glucose.: 232 mg/dL (03-29-19 @ 13:04)  POCT Blood Glucose.: 171 mg/dL (03-29-19 @ 09:45)  POCT Blood Glucose.: 125 mg/dL (03-29-19 @ 05:15)  POCT Blood Glucose.: 129 mg/dL (03-29-19 @ 04:02)  POCT Blood Glucose.: 123 mg/dL (03-29-19 @ 03:18)  POCT Blood Glucose.: 112 mg/dL (03-29-19 @ 02:44)  POCT Blood Glucose.: 103 mg/dL (03-29-19 @ 02:09)                            11.9   4.1   )-----------( 183      ( 29 Mar 2019 02:38 )             37.2       03-29    142  |  109<H>  |  22  ----------------------------<  106<H>  4.0   |  20<L>  |  1.82<H>    EGFR if : 44<L>  EGFR if non : 38<L>    Ca    9.4      03-29  Mg     2.0     03-29  Phos  3.6     03-29    TPro  6.7  /  Alb  3.9  /  TBili  0.2  /  DBili  x   /  AST  24  /  ALT  33  /  AlkPhos  59  03-29      Thyroid Function Tests:      Hemoglobin A1C, Whole Blood: 9.2 % <H> [4.0 - 5.6] (03-09-19 @ 10:23)          Radiology:

## 2019-03-29 NOTE — ED PROVIDER NOTE - OBJECTIVE STATEMENT
63 M DM c/b diabetic retinopathy resulting in blindness, kidney transplant b/c of ESRD in 2011, presents due to episode of seizure like activity described as limb jerking while at home earlier tonight. He was at [employer] and ate only 3 ravioli and a breadstick, but had mostly salad, went home, found his FSG to be in 260s took 5u novalog and then rechecked his FSG found it was 260s again so took his nighttime levemir. Levemir was supposed to be discontinued during his recent hospitalization <2 weeks ago for fevers and bacteremia for which a PICC was placed and he finished his last dose of vancomycin 2 days ago.  EMS called found to have hypoglycemia too low for glucometer to read, given amp of D50 and mental status improved. Wife at bedside denies incontinence, reports he was slightly confused when his mental status improved. Denies taking any PO meds for DM

## 2019-03-29 NOTE — H&P ADULT - HISTORY OF PRESENT ILLNESS
63 year old male with history of DM Type II, diabetic retinopathy, s/p kidney transplant in 2011, recent admission and discharge from [facility] for high grade coag neg staph bacteremia and pyelonephritis (s/p 2 week course of vancomycin, recently completed, PICC removed 2 days ago), presenting from home with seizure in the setting of hypoglycemia. The patient states that his blood glucose levels were running high after his discharge from the hospital. He went to eat at [employer] yesterday, and he had only salad and breadsticks from there along with 4 pieces of ravioli. His glucose when he got home was 226. He took 5 units of novolog at that time. He had antonino crackers, jello, and peaches before bed. And he took his usual 10 units of levemir. However, the patient states that he is blind and that he may have mistaken the novolog for the levemir. He usually measures out the insulin by listening for the clicks of the pen, and he can tell the 2 insulin pens apart by their sizes/shape. After taking the insulin he fell asleep and his wife woke up when she noticed that he was trembling. She tried to wake him up but was unsuccessful. She noticed that he was trembling all over. A family member called 911, and EMS revived him with D50 after they arrived on scene. He gained consciousness immediately after receiving the D50 per family.     He denies any weakness, numbness/tingling, fevers, chills, night sweats, dysuria, night sweats, chest pain, chest pressure, light headedness, dizziness.     He currently feels well. No other complaints.     He usually takes 10 units of novolog pre meals (has been doing it 2x day recently), and 10 units levemir at bedtime.

## 2019-03-29 NOTE — H&P ADULT - PROBLEM SELECTOR PLAN 6
-s/p 2 weeks of vanc, completed a few days ago  -PICC was removed recently  -remains afebrile -s/p 2 weeks of vanc, completed a few days ago  -PICC was removed recently  -remains afebrile  -blood and urine cultures sent from ED, f/u

## 2019-03-29 NOTE — H&P ADULT - PROBLEM SELECTOR PLAN 2
-pt may have inadvertently taken higher than prescribed amount of novolog   -will hold all insulin for now and monitor fingersticks  -consult endocrine  -diabetic diet

## 2019-03-29 NOTE — CONSULT NOTE ADULT - ASSESSMENT
Kidney transplant recipient admitted with hypoglycemic episode related to insulin administration error. Baseline allograft function. recent bacteremia

## 2019-03-29 NOTE — CONSULT NOTE ADULT - ASSESSMENT
63 year old male with history of DM Type II 9.2, diabetic retinopathy, s/p kidney transplant in 2011, recent admission and discharge from [facility] for high grade coag neg staph bacteremia and pyelonephritis (s/p 2 week course of vancomycin, recently completed) presenting from home with seizure in the setting of hypoglycemia.  pt is legally blind and it is believed pt took the Novolog instead of levemir at night and had resultant hypoglycemia.    #Type 2 DM with complications   no need for stringent control after hypoglycemia episode with seizure   Blood glucose goal 140-200  place only on low dose ISS now and gather 24 hr requirments   will add on basal insulin as needed if >250 twice, would start lantus 5 units in this case and check 3 am glucose   while inpt:  would asses DM education, glucometer use and insulin administration with teachback.  DC regimen: TBD though if basal bolus, than would consider that a rubber band be placed on levemir so pt can note the difference in pens by palpation vs Basal insulin plus oral such as trajenta with outpt followup for safety  please have RDE consult inpt   please inform pts private endocrine of inpt admission and need for close followup after dc     #HLD  continue statin, check Lipid panel fasting to assess TG level if safe to use DPP4    #HTN   goal BP in DM <130/80, as per primary team 63 year old male with history of DM Type II 9.2, diabetic retinopathy, s/p kidney transplant in 2011, recent admission and discharge from [facility] for high grade coag neg staph bacteremia and pyelonephritis (s/p 2 week course of vancomycin, recently completed) presenting from home with seizure in the setting of hypoglycemia.  pt is legally blind and it is believed pt took the Novolog instead of levemir at night and had resultant hypoglycemia.    #Type 2 DM with complications   no need for stringent control after hypoglycemia episode with seizure   Blood glucose goal 140-200  place only on low dose ISS now and gather 24 hr requirments   will add on basal insulin as needed if >250 twice, would start lantus 5 units in this case and check 3 am glucose   while inpt:  would asses DM education, glucometer use and insulin administration with teachback.  DC regimen: TBD though if basal bolus, than would consider that a rubber band be placed on levemir so pt can note the difference in pens by palpation vs Basal insulin plus oral such as trajenta with outpt followup for safety  please have RDE consult inpt   would also send new glucometer as per pts insurance (curerntly not working per pt) ideally needs glucometer that will read out the FSBG. (PRODIGY voice meter/strips/lancets)  please inform pts private endocrine of inpt admission and need for close followup after dc     #HLD  continue statin, check Lipid panel fasting to assess TG level if safe to use DPP4    #HTN   goal BP in DM <130/80, as per primary team

## 2019-03-30 LAB
ANION GAP SERPL CALC-SCNC: 11 MMOL/L — SIGNIFICANT CHANGE UP (ref 5–17)
BASOPHILS # BLD AUTO: 0.03 K/UL — SIGNIFICANT CHANGE UP (ref 0–0.2)
BASOPHILS NFR BLD AUTO: 0.8 % — SIGNIFICANT CHANGE UP (ref 0–2)
BUN SERPL-MCNC: 21 MG/DL — SIGNIFICANT CHANGE UP (ref 7–23)
CALCIUM SERPL-MCNC: 9.3 MG/DL — SIGNIFICANT CHANGE UP (ref 8.4–10.5)
CHLORIDE SERPL-SCNC: 107 MMOL/L — SIGNIFICANT CHANGE UP (ref 96–108)
CO2 SERPL-SCNC: 24 MMOL/L — SIGNIFICANT CHANGE UP (ref 22–31)
CREAT SERPL-MCNC: 1.99 MG/DL — HIGH (ref 0.5–1.3)
CULTURE RESULTS: SIGNIFICANT CHANGE UP
EOSINOPHIL # BLD AUTO: 0.09 K/UL — SIGNIFICANT CHANGE UP (ref 0–0.5)
EOSINOPHIL NFR BLD AUTO: 2.3 % — SIGNIFICANT CHANGE UP (ref 0–6)
GLUCOSE BLDC GLUCOMTR-MCNC: 193 MG/DL — HIGH (ref 70–99)
GLUCOSE BLDC GLUCOMTR-MCNC: 275 MG/DL — HIGH (ref 70–99)
GLUCOSE BLDC GLUCOMTR-MCNC: 290 MG/DL — HIGH (ref 70–99)
GLUCOSE BLDC GLUCOMTR-MCNC: 365 MG/DL — HIGH (ref 70–99)
GLUCOSE SERPL-MCNC: 208 MG/DL — HIGH (ref 70–99)
HCT VFR BLD CALC: 37.5 % — LOW (ref 39–50)
HGB BLD-MCNC: 11.1 G/DL — LOW (ref 13–17)
IMM GRANULOCYTES NFR BLD AUTO: 0.3 % — SIGNIFICANT CHANGE UP (ref 0–1.5)
LYMPHOCYTES # BLD AUTO: 0.6 K/UL — LOW (ref 1–3.3)
LYMPHOCYTES # BLD AUTO: 15.5 % — SIGNIFICANT CHANGE UP (ref 13–44)
MCHC RBC-ENTMCNC: 21.9 PG — LOW (ref 27–34)
MCHC RBC-ENTMCNC: 29.6 GM/DL — LOW (ref 32–36)
MCV RBC AUTO: 74.1 FL — LOW (ref 80–100)
MONOCYTES # BLD AUTO: 0.48 K/UL — SIGNIFICANT CHANGE UP (ref 0–0.9)
MONOCYTES NFR BLD AUTO: 12.4 % — SIGNIFICANT CHANGE UP (ref 2–14)
NEUTROPHILS # BLD AUTO: 2.67 K/UL — SIGNIFICANT CHANGE UP (ref 1.8–7.4)
NEUTROPHILS NFR BLD AUTO: 68.7 % — SIGNIFICANT CHANGE UP (ref 43–77)
PLATELET # BLD AUTO: 210 K/UL — SIGNIFICANT CHANGE UP (ref 150–400)
POTASSIUM SERPL-MCNC: 4.9 MMOL/L — SIGNIFICANT CHANGE UP (ref 3.5–5.3)
POTASSIUM SERPL-SCNC: 4.9 MMOL/L — SIGNIFICANT CHANGE UP (ref 3.5–5.3)
RBC # BLD: 5.06 M/UL — SIGNIFICANT CHANGE UP (ref 4.2–5.8)
RBC # FLD: 16.7 % — HIGH (ref 10.3–14.5)
SODIUM SERPL-SCNC: 142 MMOL/L — SIGNIFICANT CHANGE UP (ref 135–145)
SPECIMEN SOURCE: SIGNIFICANT CHANGE UP
TACROLIMUS SERPL-MCNC: 12.7 NG/ML — SIGNIFICANT CHANGE UP
WBC # BLD: 3.88 K/UL — SIGNIFICANT CHANGE UP (ref 3.8–10.5)
WBC # FLD AUTO: 3.88 K/UL — SIGNIFICANT CHANGE UP (ref 3.8–10.5)

## 2019-03-30 PROCEDURE — 99222 1ST HOSP IP/OBS MODERATE 55: CPT | Mod: GC

## 2019-03-30 RX ADMIN — Medication 81 MILLIGRAM(S): at 12:55

## 2019-03-30 RX ADMIN — INSULIN GLARGINE 5 UNIT(S): 100 INJECTION, SOLUTION SUBCUTANEOUS at 22:31

## 2019-03-30 RX ADMIN — HEPARIN SODIUM 5000 UNIT(S): 5000 INJECTION INTRAVENOUS; SUBCUTANEOUS at 16:15

## 2019-03-30 RX ADMIN — Medication 3: at 22:28

## 2019-03-30 RX ADMIN — Medication 1: at 09:34

## 2019-03-30 RX ADMIN — MYCOPHENOLATE MOFETIL 250 MILLIGRAM(S): 250 CAPSULE ORAL at 05:41

## 2019-03-30 RX ADMIN — TACROLIMUS 3 MILLIGRAM(S): 5 CAPSULE ORAL at 05:41

## 2019-03-30 RX ADMIN — Medication 60 MILLIGRAM(S): at 05:41

## 2019-03-30 RX ADMIN — MYCOPHENOLATE MOFETIL 250 MILLIGRAM(S): 250 CAPSULE ORAL at 18:48

## 2019-03-30 RX ADMIN — HEPARIN SODIUM 5000 UNIT(S): 5000 INJECTION INTRAVENOUS; SUBCUTANEOUS at 22:28

## 2019-03-30 RX ADMIN — Medication 300 MILLIGRAM(S): at 05:41

## 2019-03-30 RX ADMIN — Medication 5 MILLIGRAM(S): at 05:41

## 2019-03-30 RX ADMIN — Medication 3: at 12:54

## 2019-03-30 RX ADMIN — Medication 1 TABLET(S): at 12:55

## 2019-03-30 RX ADMIN — Medication 300 MILLIGRAM(S): at 16:16

## 2019-03-30 RX ADMIN — Medication 3: at 18:49

## 2019-03-30 RX ADMIN — Medication 300 MILLIGRAM(S): at 22:31

## 2019-03-30 RX ADMIN — ATORVASTATIN CALCIUM 80 MILLIGRAM(S): 80 TABLET, FILM COATED ORAL at 22:31

## 2019-03-30 RX ADMIN — HEPARIN SODIUM 5000 UNIT(S): 5000 INJECTION INTRAVENOUS; SUBCUTANEOUS at 05:41

## 2019-03-30 RX ADMIN — TACROLIMUS 3 MILLIGRAM(S): 5 CAPSULE ORAL at 18:48

## 2019-03-30 RX ADMIN — TAMSULOSIN HYDROCHLORIDE 0.4 MILLIGRAM(S): 0.4 CAPSULE ORAL at 22:31

## 2019-03-30 NOTE — PROGRESS NOTE ADULT - ASSESSMENT
63 year old male with history of DM Type II, diabetic retinopathy, s/p kidney transplant in 2011, recent admission and discharge from [facility] for high grade coag neg staph bacteremia and pyelonephritis (s/p 2 week course of vancomycin, recently completed, PICC removed 2 days ago), presenting from home with seizure in the setting of hypoglycemia.    Problem/Plan - 1:  ·  Problem: Seizure.  Plan: -due to hypoglycemia, now resolved  -continue to manage hypoglycemia as below  -neurochecks q4hrs for now.     Problem/Plan - 2:  ·  Problem: Hypoglycemia.  Plan: -pt may have inadvertently taken higher than prescribed amount of novolog   -will hold all insulin for now and monitor fingersticks  -consult endocrine  -diabetic diet.     Problem/Plan - 3:  ·  Problem: Kidney transplant recipient.  Plan: -Cr today is at baseline, ~1.6-1.8 as per renal note from prior admission   -Continue with tacro and prednisone   -will consult transplant renal   -tacro level of 7.1.     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  Plan: -hold insulin for now  -endocrine consult  -check fingersticks TID AC+QHS

## 2019-03-30 NOTE — CONSULT NOTE ADULT - SUBJECTIVE AND OBJECTIVE BOX
HPI:  63 year old male with history of DM Type II, diabetic retinopathy, s/p kidney transplant in , recent admission and discharge from [facility] for high grade coag neg staph bacteremia and pyelonephritis (s/p 2 week course of vancomycin, recently completed, PICC removed 2 days ago), presenting from home with seizure in the setting of hypoglycemia. The patient states that his blood glucose levels were running high after his discharge from the hospital. He went to eat at [employer] yesterday, and he had only salad and breadsticks from there along with 4 pieces of ravioli. His glucose when he got home was 226. He took 5 units of novolog at that time. He had antonino crackers, jello, and peaches before bed. And he took his usual 10 units of levemir. However, the patient states that he is blind and that he may have mistaken the novolog for the levemir. He usually measures out the insulin by listening for the clicks of the pen, and he can tell the 2 insulin pens apart by their sizes/shape. After taking the insulin he fell asleep and his wife woke up when she noticed that he was trembling. She tried to wake him up but was unsuccessful. She noticed that he was trembling all over. A family member called 911, and EMS revived him with D50 after they arrived on scene. He gained consciousness immediately after receiving the D50 per family.     He denies any weakness, numbness/tingling, fevers, chills, night sweats, dysuria, night sweats, chest pain, chest pressure, light headedness, dizziness.     He currently feels well. No other complaints.     He usually takes 10 units of novolog pre meals (has been doing it 2x day recently), and 10 units levemir at bedtime. (29 Mar 2019 10:07)      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 and limited vision in left eye  Bell's palsy:   ESRD (end stage renal disease): s/p renal transplant  Type 2 diabetes mellitus  Obesity  Hypertension  Dyslipidemia  Diabetic neuropathy associated with type 2 diabetes mellitus  Benign prostatic hypertrophy  History of detached retina repair: right eye  S/P TURP  S/P kidney transplant:       Allergies  No Known Drug Allergies  Seafood (Pruritus; Rash)        ANTIMICROBIALS:      OTHER MEDS: MEDICATIONS  (STANDING):  aspirin enteric coated 81 daily  atorvastatin 80 at bedtime  dextrose 40% Gel 15 once PRN  dextrose 50% Injectable 12.5 once  dextrose 50% Injectable 25 once  dextrose 50% Injectable 25 once  docusate sodium 100 daily PRN  glucagon  Injectable 1 once PRN  heparin  Injectable 5000 every 8 hours  insulin glargine Injectable (LANTUS) 5 at bedtime  insulin lispro (HumaLOG) corrective regimen sliding scale  three times a day before meals  insulin lispro (HumaLOG) corrective regimen sliding scale  at bedtime  labetalol 300 three times a day  mycophenolate mofetil 250 two times a day  NIFEdipine XL 60 daily  predniSONE   Tablet 5 daily  tacrolimus 3 every 12 hours  tamsulosin 0.4 at bedtime      SOCIAL HISTORY:  [ ] etoh [ ] tobacco [ ] former smoker [ ] IVDU    FAMILY HISTORY:  No pertinent family history in first degree relatives      REVIEW OF SYSTEMS  [  ] ROS unobtainable because:    [  ] All other systems negative except as noted below:	    Constitutional:  [ ] fever [ ] chills  [ ] weight loss  [ ] weakness  Skin:  [ ] rash [ ] phlebitis	  Eyes: [ ] icterus [ ] pain  [ ] discharge	  ENMT: [ ] sore throat  [ ] thrush [ ] ulcers [ ] exudates  Respiratory: [ ] dyspnea [ ] hemoptysis [ ] cough [ ] sputum	  Cardiovascular:  [ ] chest pain [ ] palpitations [ ] edema	  Gastrointestinal:  [ ] nausea [ ] vomiting [ ] diarrhea [ ] constipation [ ] pain	  Genitourinary:  [ ] dysuria [ ] frequency [ ] hematuria [ ] discharge [ ] flank pain  [ ] incontinence  Musculoskeletal:  [ ] myalgias [ ] arthralgias [ ] arthritis  [ ] back pain  Neurological:  [ ] headache [ ] seizures  [ ] confusion/altered mental status  Psychiatric:  [ ] anxiety [ ] depression	  Hematology/Lymphatics:  [ ] lymphadenopathy  Endocrine:  [ ] adrenal [ ] thyroid  Allergic/Immunologic:	 [ ] transplant [ ] seasonal    Vital Signs Last 24 Hrs  T(F): 97.9 (19 @ 12:51), Max: 98.7 (19 @ 04:49)    Vital Signs Last 24 Hrs  HR: 70 (19 @ 12:51) (66 - 85)  BP: 146/79 (19 @ 12:51) (120/77 - 156/90)  RR: 18 (19 @ 12:51)  SpO2: 97% (19 @ 12:51) (96% - 98%)  Wt(kg): --    PHYSICAL EXAM:  General: non-toxic  HEAD/EYES: anicteric, PERRL  ENT:  supple  Cardiovascular:   S1, S2  Respiratory:  clear bilaterally  GI:  soft, non-tender, normal bowel sounds  :  no CVA tenderness   Musculoskeletal:  no synovitis  Neurologic:  grossly non-focal  Skin:  no rash  Lymph: no lymphadenopathy  Psychiatric:  appropriate affect  Vascular:  no phlebitis                                11.1   3.88  )-----------( 210      ( 30 Mar 2019 11:02 )             37.5           142  |  107  |  21  ----------------------------<  208<H>  4.9   |  24  |  1.99<H>    Ca    9.3      30 Mar 2019 08:37  Phos  3.6       Mg     2.0         TPro  6.7  /  Alb  3.9  /  TBili  0.2  /  DBili  x   /  AST  24  /  ALT  33  /  AlkPhos  59        Urinalysis Basic - ( 29 Mar 2019 12:16 )    Color: Yellow / Appearance: Clear / S.029 / pH: x  Gluc: x / Ketone: Small  / Bili: Negative / Urobili: Negative   Blood: x / Protein: 30 mg/dL / Nitrite: Negative   Leuk Esterase: Negative / RBC: 2 /hpf / WBC 3 /HPF   Sq Epi: x / Non Sq Epi: 2 / Bacteria: Negative        MICROBIOLOGY:  .Urine Clean Catch (Midstream)  19   <10,000 CFU/mL Normal Urogenital Pamela  --  --      .Blood Blood  19   No growth to date.  --  --      .Blood Blood-Peripheral  19   No growth at 5 days.  --  --      .Urine Clean Catch (Midstream)  19   >100,000 CFU/ml Coag Negative Staphylococcus  "Susceptibilities not performed"  --  --      .Blood Blood  19   Growth in aerobic and anaerobic bottles: Staphylococcus epidermidis  --  Blood Culture PCR  Coag Negative Staphylococcus              v            RADIOLOGY: HPI:    63 year old male PMH DM Type II, diabetic retinopathy, s/p kidney transplant in  from [facility] who presented to [facility] on 3/29/19 oglycemia. The patient states that his blood glucose levels were running high after his discharge from the hospital. He went to eat at [employer] yesterday, and he had only salad and breadsticks from there along with 4 pieces of ravioli. His glucose when he got home was 226. He took 5 units of novolog at that time. He had antonino crackers, jello, and peaches before bed. And he took his usual 10 units of levemir. However, the patient states that he is blind and that he may have mistaken the novolog for the levemir. He usually measures out the insulin by listening for the clicks of the pen, and he can tell the 2 insulin pens apart by their sizes/shape. After taking the insulin he fell asleep and his wife woke up when she noticed that he was trembling. She tried to wake him up but was unsuccessful. She noticed that he was trembling all over. A family member called 911, and EMS revived him with D50 after they arrived on scene. He gained consciousness immediately after receiving the D50 per family.     On presentation afebrile, tachycardic to > 90 but normotensive. WBC 4.1 with 72%. Lactic acid of 2.2. U/A with 3 WBC.       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 and limited vision in left eye  Bell's palsy:   ESRD (end stage renal disease): s/p renal transplant  Type 2 diabetes mellitus  Obesity  Hypertension  Dyslipidemia  Diabetic neuropathy associated with type 2 diabetes mellitus  Benign prostatic hypertrophy  History of detached retina repair: right eye  S/P TURP  S/P kidney transplant:       Allergies  No Known Drug Allergies  Seafood (Pruritus; Rash)        ANTIMICROBIALS:      OTHER MEDS: MEDICATIONS  (STANDING):  aspirin enteric coated 81 daily  atorvastatin 80 at bedtime  dextrose 40% Gel 15 once PRN  dextrose 50% Injectable 12.5 once  dextrose 50% Injectable 25 once  dextrose 50% Injectable 25 once  docusate sodium 100 daily PRN  glucagon  Injectable 1 once PRN  heparin  Injectable 5000 every 8 hours  insulin glargine Injectable (LANTUS) 5 at bedtime  insulin lispro (HumaLOG) corrective regimen sliding scale  three times a day before meals  insulin lispro (HumaLOG) corrective regimen sliding scale  at bedtime  labetalol 300 three times a day  mycophenolate mofetil 250 two times a day  NIFEdipine XL 60 daily  predniSONE   Tablet 5 daily  tacrolimus 3 every 12 hours  tamsulosin 0.4 at bedtime      SOCIAL HISTORY:  [ ] etoh [ ] tobacco [ ] former smoker [ ] IVDU    FAMILY HISTORY:  No pertinent family history in first degree relatives      REVIEW OF SYSTEMS  [  ] ROS unobtainable because:    [  ] All other systems negative except as noted below:	    Constitutional:  [ ] fever [ ] chills  [ ] weight loss  [ ] weakness  Skin:  [ ] rash [ ] phlebitis	  Eyes: [ ] icterus [ ] pain  [ ] discharge	  ENMT: [ ] sore throat  [ ] thrush [ ] ulcers [ ] exudates  Respiratory: [ ] dyspnea [ ] hemoptysis [ ] cough [ ] sputum	  Cardiovascular:  [ ] chest pain [ ] palpitations [ ] edema	  Gastrointestinal:  [ ] nausea [ ] vomiting [ ] diarrhea [ ] constipation [ ] pain	  Genitourinary:  [ ] dysuria [ ] frequency [ ] hematuria [ ] discharge [ ] flank pain  [ ] incontinence  Musculoskeletal:  [ ] myalgias [ ] arthralgias [ ] arthritis  [ ] back pain  Neurological:  [ ] headache [ ] seizures  [ ] confusion/altered mental status  Psychiatric:  [ ] anxiety [ ] depression	  Hematology/Lymphatics:  [ ] lymphadenopathy  Endocrine:  [ ] adrenal [ ] thyroid  Allergic/Immunologic:	 [ ] transplant [ ] seasonal    Vital Signs Last 24 Hrs  T(F): 97.9 (19 @ 12:51), Max: 98.7 (19 @ 04:49)    Vital Signs Last 24 Hrs  HR: 70 (19 @ 12:51) (66 - 85)  BP: 146/79 (19 @ 12:51) (120/77 - 156/90)  RR: 18 (19 @ 12:51)  SpO2: 97% (19 @ 12:51) (96% - 98%)  Wt(kg): --    PHYSICAL EXAM:  General: non-toxic  HEAD/EYES: anicteric, PERRL  ENT:  supple  Cardiovascular:   S1, S2  Respiratory:  clear bilaterally  GI:  soft, non-tender, normal bowel sounds  :  no CVA tenderness   Musculoskeletal:  no synovitis  Neurologic:  grossly non-focal  Skin:  no rash  Lymph: no lymphadenopathy  Psychiatric:  appropriate affect  Vascular:  no phlebitis                                11.1   3.88  )-----------( 210      ( 30 Mar 2019 11:02 )             37.5           142  |  107  |  21  ----------------------------<  208<H>  4.9   |  24  |  1.99<H>    Ca    9.3      30 Mar 2019 08:37  Phos  3.6       Mg     2.0         TPro  6.7  /  Alb  3.9  /  TBili  0.2  /  DBili  x   /  AST  24  /  ALT  33  /  AlkPhos  59        Urinalysis Basic - ( 29 Mar 2019 12:16 )    Color: Yellow / Appearance: Clear / S.029 / pH: x  Gluc: x / Ketone: Small  / Bili: Negative / Urobili: Negative   Blood: x / Protein: 30 mg/dL / Nitrite: Negative   Leuk Esterase: Negative / RBC: 2 /hpf / WBC 3 /HPF   Sq Epi: x / Non Sq Epi: 2 / Bacteria: Negative        MICROBIOLOGY:  .Urine Clean Catch (Midstream)  19   <10,000 CFU/mL Normal Urogenital Pamela  --  --      .Blood Blood  19   No growth to date.  --  --      .Blood Blood-Peripheral  19   No growth at 5 days.  --  --      .Urine Clean Catch (Midstream)  19   >100,000 CFU/ml Coag Negative Staphylococcus  "Susceptibilities not performed"  --  --      .Blood Blood  19   Growth in aerobic and anaerobic bottles: Staphylococcus epidermidis  --  Blood Culture PCR  Coag Negative Staphylococcus              v            RADIOLOGY: HPI:    63 year old male [facility] DM Type II, diabetic retinopathy, s/p kidney transplant in  from [facility] who presented to [facility] on 3/29/19 hypoglycemia. Recent admission 3/8/19 - 3/18/19 for fever and found to have CoNS bacteremia. Patient also with transplant kidney stranding on imaging which was the suspected source. TTE negative for vegetations. Patient was started on Vancomycin and completed 2 week course ending 3/21/19. Patient's PICC line was removed 2 days prior to admission. Patient notes that on the day prior to admission he went to eat at [employer] and believes he may have mistaken his novolog for his levemir (as the patient is blind). He took his dose of insulin and fell asleep. His wife noticed that he was having seizures at this point with shaking of his upper and lower extremities. She believes it may have lasted for up to 20 minutes. No noted urinary or fecal incontinence. No noted tongue biting. EMS was called and wife recalls being told that he was hypoglycemic and was administered D50 and sent the ED. Aside from these acute events patient denied any chills or fevers after completion of his antibiotics. Denied any pain at his now removed PICC line site. He denied N/V/D or abdominal pain. No dysuria or urinary frequency.      On presentation afebrile, tachycardic to > 90 but normotensive. WBC 4.1 with 72%. Lactic acid of 2.2. U/A with 3 WBC.       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 and limited vision in left eye  Bell's palsy:   ESRD (end stage renal disease): s/p renal transplant  Type 2 diabetes mellitus  Obesity  Hypertension  Dyslipidemia  Diabetic neuropathy associated with type 2 diabetes mellitus  Benign prostatic hypertrophy  History of detached retina repair: right eye  S/P TURP  S/P kidney transplant:       Allergies  No Known Drug Allergies  Seafood (Pruritus; Rash)        ANTIMICROBIALS:      OTHER MEDS: MEDICATIONS  (STANDING):  aspirin enteric coated 81 daily  atorvastatin 80 at bedtime  dextrose 40% Gel 15 once PRN  dextrose 50% Injectable 12.5 once  dextrose 50% Injectable 25 once  dextrose 50% Injectable 25 once  docusate sodium 100 daily PRN  glucagon  Injectable 1 once PRN  heparin  Injectable 5000 every 8 hours  insulin glargine Injectable (LANTUS) 5 at bedtime  insulin lispro (HumaLOG) corrective regimen sliding scale  three times a day before meals  insulin lispro (HumaLOG) corrective regimen sliding scale  at bedtime  labetalol 300 three times a day  mycophenolate mofetil 250 two times a day  NIFEdipine XL 60 daily  predniSONE   Tablet 5 daily  tacrolimus 3 every 12 hours  tamsulosin 0.4 at bedtime      SOCIAL HISTORY:  [ ] etoh [ ] tobacco [ ] former smoker [ ] IVDU    FAMILY HISTORY:  No pertinent family history in first degree relatives      REVIEW OF SYSTEMS  [  ] ROS unobtainable because:    [  ] All other systems negative except as noted below:	    Constitutional:  [ ] fever [ ] chills  [ ] weight loss  [ ] weakness  Skin:  [ ] rash [ ] phlebitis	  Eyes: [ ] icterus [ ] pain  [ ] discharge	  ENMT: [ ] sore throat  [ ] thrush [ ] ulcers [ ] exudates  Respiratory: [ ] dyspnea [ ] hemoptysis [ ] cough [ ] sputum	  Cardiovascular:  [ ] chest pain [ ] palpitations [ ] edema	  Gastrointestinal:  [ ] nausea [ ] vomiting [ ] diarrhea [ ] constipation [ ] pain	  Genitourinary:  [ ] dysuria [ ] frequency [ ] hematuria [ ] discharge [ ] flank pain  [ ] incontinence  Musculoskeletal:  [ ] myalgias [ ] arthralgias [ ] arthritis  [ ] back pain  Neurological:  [ ] headache [ ] seizures  [ ] confusion/altered mental status  Psychiatric:  [ ] anxiety [ ] depression	  Hematology/Lymphatics:  [ ] lymphadenopathy  Endocrine:  [ ] adrenal [ ] thyroid  Allergic/Immunologic:	 [ ] transplant [ ] seasonal    Vital Signs Last 24 Hrs  T(F): 97.9 (19 @ 12:51), Max: 98.7 (19 @ 04:49)    Vital Signs Last 24 Hrs  HR: 70 (19 @ 12:51) (66 - 85)  BP: 146/79 (19 @ 12:51) (120/77 - 156/90)  RR: 18 (19 @ 12:51)  SpO2: 97% (19 @ 12:51) (96% - 98%)  Wt(kg): --    PHYSICAL EXAM:  General: non-toxic  HEAD/EYES: anicteric, PERRL  ENT:  supple  Cardiovascular:   S1, S2  Respiratory:  clear bilaterally  GI:  soft, non-tender, normal bowel sounds  :  no CVA tenderness   Musculoskeletal:  no synovitis  Neurologic:  grossly non-focal  Skin:  no rash  Lymph: no lymphadenopathy  Psychiatric:  appropriate affect  Vascular:  no phlebitis                                11.1   3.88  )-----------( 210      ( 30 Mar 2019 11:02 )             37.5           142  |  107  |  21  ----------------------------<  208<H>  4.9   |  24  |  1.99<H>    Ca    9.3      30 Mar 2019 08:37  Phos  3.6       Mg     2.0         TPro  6.7  /  Alb  3.9  /  TBili  0.2  /  DBili  x   /  AST  24  /  ALT  33  /  AlkPhos  59        Urinalysis Basic - ( 29 Mar 2019 12:16 )    Color: Yellow / Appearance: Clear / S.029 / pH: x  Gluc: x / Ketone: Small  / Bili: Negative / Urobili: Negative   Blood: x / Protein: 30 mg/dL / Nitrite: Negative   Leuk Esterase: Negative / RBC: 2 /hpf / WBC 3 /HPF   Sq Epi: x / Non Sq Epi: 2 / Bacteria: Negative        MICROBIOLOGY:  .Urine Clean Catch (Midstream)  19   <10,000 CFU/mL Normal Urogenital Pamela  --  --      .Blood Blood  19   No growth to date.  --  --      .Blood Blood-Peripheral  19   No growth at 5 days.  --  --      .Urine Clean Catch (Midstream)  19   >100,000 CFU/ml Coag Negative Staphylococcus  "Susceptibilities not performed"  --  --      .Blood Blood  19   Growth in aerobic and anaerobic bottles: Staphylococcus epidermidis  --  Blood Culture PCR  Coag Negative Staphylococcus              v            RADIOLOGY: HPI:    63 year old male [facility] DM Type II, diabetic retinopathy, s/p kidney transplant in  from [facility] who presented to [facility] on 3/29/19 hypoglycemia. Recent admission 3/8/19 - 3/18/19 for fever and found to have CoNS bacteremia. Patient also with transplant kidney stranding on imaging which was the suspected source. TTE negative for vegetations. Patient was started on Vancomycin and completed 2 week course ending 3/21/19. Patient's PICC line was removed 2 days prior to admission. Patient notes that on the day prior to admission he went to eat at [employer] and believes he may have mistaken his novolog for his levemir (as the patient is blind). He took his dose of insulin and fell asleep. His wife noticed that he was having seizures at this point with shaking of his upper and lower extremities. She believes it may have lasted for up to 20 minutes. No noted urinary or fecal incontinence. No noted tongue biting. EMS was called and wife recalls being told that he was hypoglycemic and was administered D50 and sent the ED. Aside from these acute events patient denied any chills or fevers after completion of his antibiotics. Denied any pain at his now removed PICC line site. He denied N/V/D or abdominal pain. No dysuria or urinary frequency.      On presentation afebrile, tachycardic to > 90 but normotensive. WBC 4.1 with 72%. Lactic acid of 2.2. U/A with 3 WBC.       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 and limited vision in left eye  Bell's palsy:   ESRD (end stage renal disease): s/p renal transplant  Type 2 diabetes mellitus  Obesity  Hypertension  Dyslipidemia  Diabetic neuropathy associated with type 2 diabetes mellitus  Benign prostatic hypertrophy  History of detached retina repair: right eye  S/P TURP  S/P kidney transplant:       Allergies  No Known Drug Allergies  Seafood (Pruritus; Rash)        ANTIMICROBIALS:      OTHER MEDS: MEDICATIONS  (STANDING):  aspirin enteric coated 81 daily  atorvastatin 80 at bedtime  dextrose 40% Gel 15 once PRN  dextrose 50% Injectable 12.5 once  dextrose 50% Injectable 25 once  dextrose 50% Injectable 25 once  docusate sodium 100 daily PRN  glucagon  Injectable 1 once PRN  heparin  Injectable 5000 every 8 hours  insulin glargine Injectable (LANTUS) 5 at bedtime  insulin lispro (HumaLOG) corrective regimen sliding scale  three times a day before meals  insulin lispro (HumaLOG) corrective regimen sliding scale  at bedtime  labetalol 300 three times a day  mycophenolate mofetil 250 two times a day  NIFEdipine XL 60 daily  predniSONE   Tablet 5 daily  tacrolimus 3 every 12 hours  tamsulosin 0.4 at bedtime      SOCIAL HISTORY: no smoking, recent etoh use or drug use. no recent travel    FAMILY HISTORY:  Mother with history of ESRD on HD  Father with history of ESRD on HD    REVIEW OF SYSTEMS  [  ] ROS unobtainable because:    [ x ] All other systems negative except as noted below:	    Constitutional:  [ ] fever [ ] chills  [ ] weight loss  [ ] weakness  Skin:  [ ] rash [ ] phlebitis	  Eyes: [ ] icterus [ ] pain  [ ] discharge	  ENMT: [ ] sore throat  [ ] thrush [ ] ulcers [ ] exudates  Respiratory: [ ] dyspnea [ ] hemoptysis [ ] cough [ ] sputum	  Cardiovascular:  [ ] chest pain [ ] palpitations [ ] edema	  Gastrointestinal:  [ ] nausea [ ] vomiting [ ] diarrhea [ ] constipation [ ] pain	  Genitourinary:  [ ] dysuria [ ] frequency [ ] hematuria [ ] discharge [ ] flank pain  [ ] incontinence  Musculoskeletal:  [ ] myalgias [ ] arthralgias [ ] arthritis  [ ] back pain  Neurological:  [ ] headache [ x] seizures  [ ] confusion/altered mental status  Psychiatric:  [ ] anxiety [ ] depression	  Hematology/Lymphatics:  [ ] lymphadenopathy  Endocrine:  [ ] adrenal [ ] thyroid  Allergic/Immunologic:	 [ ] transplant [ ] seasonal    Vital Signs Last 24 Hrs  T(F): 97.9 (19 @ 12:51), Max: 98.7 (19 @ 04:49)    Vital Signs Last 24 Hrs  HR: 70 (19 @ 12:51) (66 - 85)  BP: 146/79 (19 @ 12:51) (120/77 - 156/90)  RR: 18 (19 @ 12:51)  SpO2: 97% (19 @ 12:51) (96% - 98%)  Wt(kg): --    PHYSICAL EXAMINATION:  General: Alert and Awake, NAD  HEENT: PERRL, EOMI, No subconjunctival hemorrhages, Oropharynx Clear, MMM  Neck: Supple, No MARITZA  Cardiac: RRR, No M/R/G  Resp: CTAB, No Wh/Rh/Ra  Abdomen: NBS, NT/ND, No HSM, No rigidity or guarding  MSK: +Fistula at RUE (No surrounding erythema, drainage or tenderness to palpation)No LE edema. No stigmata of IE. No evidence of phlebitis. No evidence of synovitis.  Back: No CVA Tenderness  : No gilmore  Skin: No rashes or lesions. Skin is warm and dry to the touch.   Neuro: Alert and Awake. CN 2-12 Grossly intact. Moves all four extremities spontaneously.  Psych: Calm, Pleasant, Cooperative                        11.1   3.88  )-----------( 210      ( 30 Mar 2019 11:02 )             37.5           142  |  107  |  21  ----------------------------<  208<H>  4.9   |  24  |  1.99<H>    Ca    9.3      30 Mar 2019 08:37  Phos  3.6       Mg     2.0         TPro  6.7  /  Alb  3.9  /  TBili  0.2  /  DBili  x   /  AST  24  /  ALT  33  /  AlkPhos  59        Urinalysis Basic - ( 29 Mar 2019 12:16 )    Color: Yellow / Appearance: Clear / S.029 / pH: x  Gluc: x / Ketone: Small  / Bili: Negative / Urobili: Negative   Blood: x / Protein: 30 mg/dL / Nitrite: Negative   Leuk Esterase: Negative / RBC: 2 /hpf / WBC 3 /HPF   Sq Epi: x / Non Sq Epi: 2 / Bacteria: Negative        MICROBIOLOGY:  .Urine Clean Catch (Midstream)  19   <10,000 CFU/mL Normal Urogenital Pamela  --  --      .Blood Blood  19   No growth to date.  --  --      .Blood Blood-Peripheral  19   No growth at 5 days.  --  --      .Urine Clean Catch (Midstream)  19   >100,000 CFU/ml Coag Negative Staphylococcus  "Susceptibilities not performed"  --  --      .Blood Blood  19   Growth in aerobic and anaerobic bottles: Staphylococcus epidermidis  --  Blood Culture PCR  Coag Negative Staphylococcus    RADIOLOGY:    none for review

## 2019-03-30 NOTE — CONSULT NOTE ADULT - ASSESSMENT
63 year old male PMH DM Type II, diabetic retinopathy, s/p kidney transplant in 2011 from [facility] who presented to [facility] on 3/29/19 hypoglycemia and seizure. Recent admission 3/8/19 - 3/18/19 for fever and found to have CoNS bacteremia. Patient also with transplant kidney stranding on imaging which was the suspected source. TTE negative for vegetations. Patient was started on Vancomycin and completed 2 week course ending 3/21/19. Patient's PICC line was removed 2 days prior to admission. On presentation afebrile, tachycardic to > 90 but normotensive. WBC 4.1 with 72%. Lactic acid of 2.2. U/A with 3 WBC. Blood cultures negative at 24 hours    Overall, no evidence of recurrence of infection as etiology of hypoglycemia or seizure. Would follow the blood cultures and monitor off of antibiotics    --Monitor off of antibiotics  --Follow Prelim Blood Cultures

## 2019-03-30 NOTE — CONSULT NOTE ADULT - ATTENDING COMMENTS
Aspen Pierson MD  Pager [phone] ([facility])/ [phone] ([facility]) [please provide 10 digit call back number]  Nights and weekends: [phone]  Please note that this patient may be followed by a different provider tomorrow. If no answer or after hours, please contact [phone].  For final dc reccomendations, please call [phone] or page the endocrine fellow on call.
63 year old with ESRD s/p renal tx with recent Coagn engative stpah bacteremia with hypoglycemia.    1) Coagulase negative bacteremia  S/p 2 week treatment course  Afebrile    Repeat blood cultures (3/29) without gorwth    2) hypoglycemia with seizure  Off antibiotics Improved.  Diabetic education    3) Renal transplant - immunosuppressed   Monitor for signs of infection    Monitor off abx.
Kidney recipient, DM on insulin, HTN, BPH, Hypoglycemic episode. recent bacteremia.  Reviewed immunosuppression and allograft function.  Plan:  1. Continue Tac 3/3, Cellcept 250/250 and prednisone 5 mg/d  2. Continue current antihypertensive regimen  3. Repeat urine and blood culture  4. Endocrinology follow up noted, for diabetes education and insulin administration precautions in a blind diabetic    On discharge needs out patient follow up with nephrologist and endocrinologist.  Discussed plan of care with patient , wife and primary team MD

## 2019-03-30 NOTE — CONSULT NOTE ADULT - SUBJECTIVE AND OBJECTIVE BOX
[facility] Neurological [facility]        Patient is a 63y old  Male who presents with a chief complaint of seizure, hypoglycemia (30 Mar 2019 16:53)      HPI:  63 year old male with history of DM Type II, diabetic retinopathy, s/p kidney transplant in , recent admission and discharge from [facility] for high grade coag neg staph bacteremia and pyelonephritis (s/p 2 week course of vancomycin, recently completed, PICC removed 2 days ago), presenting from home with seizure in the setting of hypoglycemia. The patient states that his blood glucose levels were running high after his discharge from the hospital. He went to eat at [employer] yesterday, and he had only salad and breadsticks from there along with 4 pieces of ravioli. His glucose when he got home was 226. He took 5 units of novolog at that time. He had antonino crackers, jello, and peaches before bed. And he took his usual 10 units of levemir. However, the patient states that he is blind and that he may have mistaken the novolog for the levemir. He usually measures out the insulin by listening for the clicks of the pen, and he can tell the 2 insulin pens apart by their sizes/shape. After taking the insulin he fell asleep and his wife woke up when she noticed that he was trembling. She tried to wake him up but was unsuccessful. She noticed that he was trembling all over. A family member called 911, and EMS revived him with D50 after they arrived on scene. He gained consciousness immediately after receiving the D50 per family.     He denies any weakness, numbness/tingling, fevers, chills, night sweats, dysuria, night sweats, chest pain, chest pressure, light headedness, dizziness.     He currently feels well. No other complaints.     He usually takes 10 units of novolog pre meals (has been doing it 2x day recently), and 10 units levemir at bedtime. (29 Mar 2019 10:07)           *****PAST MEDICAL / Surgical  HISTORY:  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 and limited vision in left eye  Bell's palsy:   ESRD (end stage renal disease): s/p renal transplant  Type 2 diabetes mellitus  Obesity  Hypertension  Dyslipidemia  Diabetic neuropathy associated with type 2 diabetes mellitus  Benign prostatic hypertrophy  History of detached retina repair: right eye  S/P TURP  S/P kidney transplant:            *****FAMILY HISTORY:  FAMILY HISTORY:  No pertinent family history in first degree relatives           *****SOCIAL HISTORY:  Alcohol: None  Smoking: None         *****ALLERGIES:   Allergies    No Known Drug Allergies  Seafood (Pruritus; Rash)    Intolerances             *****MEDICATIONS: current medication reviewed and documented.   MEDICATIONS  (STANDING):  aspirin enteric coated 81 milliGRAM(s) Oral daily  atorvastatin 80 milliGRAM(s) Oral at bedtime  calcium carbonate   1250 mG (OsCal) 1 Tablet(s) Oral daily  dextrose 5%. 1000 milliLiter(s) (50 mL/Hr) IV Continuous <Continuous>  dextrose 50% Injectable 12.5 Gram(s) IV Push once  dextrose 50% Injectable 25 Gram(s) IV Push once  dextrose 50% Injectable 25 Gram(s) IV Push once  heparin  Injectable 5000 Unit(s) SubCutaneous every 8 hours  insulin glargine Injectable (LANTUS) 5 Unit(s) SubCutaneous at bedtime  insulin lispro (HumaLOG) corrective regimen sliding scale   SubCutaneous three times a day before meals  insulin lispro (HumaLOG) corrective regimen sliding scale   SubCutaneous at bedtime  labetalol 300 milliGRAM(s) Oral three times a day  mycophenolate mofetil 250 milliGRAM(s) Oral two times a day  NIFEdipine XL 60 milliGRAM(s) Oral daily  predniSONE   Tablet 5 milliGRAM(s) Oral daily  tacrolimus 3 milliGRAM(s) Oral every 12 hours  tamsulosin 0.4 milliGRAM(s) Oral at bedtime    MEDICATIONS  (PRN):  dextrose 40% Gel 15 Gram(s) Oral once PRN Blood Glucose LESS THAN 70 milliGRAM(s)/deciliter  docusate sodium 100 milliGRAM(s) Oral daily PRN Constipation  glucagon  Injectable 1 milliGRAM(s) IntraMuscular once PRN Glucose LESS THAN 70 milligrams/deciliter           *****REVIEW OF SYSTEM:  GEN: no fever, no chills, no pain  RESP: no SOB, no cough, no sputum  CVS: no chest pain, no palpitations, no edema  GI: no abdominal pain, no nausea, no vomiting, no constipation, no diarrhea  : no dysurea, no frequency, no hematurea  Neuro: no headache, no dizziness  PSYCH: no anxiety, no depression  Derm : no itching, no rash         *****VITAL SIGNS:  T(C): 36.4 (19 @ 21:28), Max: 37.1 (19 @ 04:49)  HR: 66 (19 @ 21:28) (66 - 79)  BP: 150/78 (19 @ 21:28) (146/74 - 162/88)  RR: 18 (19 @ 21:28) (18 - 18)  SpO2: 97% (19 @ 21:28) (95% - 98%)  Wt(kg): --     @ 07:01  -   @ 07:00  --------------------------------------------------------  IN: 360 mL / OUT: 550 mL / NET: -190 mL     @ 07:01  -   @ 22:49  --------------------------------------------------------  IN: 1080 mL / OUT: 200 mL / NET: 880 mL             *****PHYSICAL EXAM:   Alert oriented x2    Attention comprehension are ok  Able to name, repeat,  without any difficulty.   Able to follow 3 step commands.  legally blind      EOMI fundi not visualized,   unable to do visual fields.  No facial asymmetry   Tongue is midline   Palate elevates symmetrically   Moving all 4 ext symmetrically no pronator drift   Reflexes are diminished  throughout   sensation is grossly symmetric  Gait : not assessed.  B/L down going toes               *****LAB AND IMAGIN.1   3.88  )-----------( 210      ( 30 Mar 2019 11:02 )             37.5                   142  |  107  |  21  ----------------------------<  208<H>  4.9   |  24  |  1.99<H>    Ca    9.3      30 Mar 2019 08:37  Phos  3.6     -  Mg     2.0     -    TPro  6.7  /  Alb  3.9  /  TBili  0.2  /  DBili  x   /  AST  24  /  ALT  33  /  AlkPhos  59                              Urinalysis Basic - ( 29 Mar 2019 12:16 )    Color: Yellow / Appearance: Clear / S.029 / pH: x  Gluc: x / Ketone: Small  / Bili: Negative / Urobili: Negative   Blood: x / Protein: 30 mg/dL / Nitrite: Negative   Leuk Esterase: Negative / RBC: 2 /hpf / WBC 3 /HPF   Sq Epi: x / Non Sq Epi: 2 / Bacteria: Negative        [All pertinent recent Imaging reports reviewed]         *****A S S E S S M E N T   A N D   P L A N :     63 year old male with history of DM Type II, diabetic retinopathy, s/p kidney transplant in , recent admission and discharge from [facility] for high grade coag neg staph bacteremia and pyelonephritis (s/p 2 week course of vancomycin, recently completed, PICC removed 2 days ago), presenting from home with seizure in the setting of hypoglycemia. The patient states that his blood glucose levels were running high after his discharge from the hospital. He went to eat at [employer] yesterday, and he had only salad and breadsticks from there along with 4 pieces of ravioli. His glucose when he got home was 226. He took 5 units of novolog at that time. He had antonino crackers, jello, and peaches before bed. And he took his usual 10 units of levemir. However, the patient states that he is blind and that he may have mistaken the novolog for the levemir. He usually measures out the insulin by listening for the clicks of the pen, and he can tell the 2 insulin pens apart by their sizes/shape. After taking the insulin he fell asleep and his wife woke up when she noticed that he was trembling. She tried to wake him up but was unsuccessful. She noticed that he was trembling all over. A family member called 911, and EMS revived him with D50 after they arrived on scene. He gained consciousness immediately after receiving the D50 per family.          Problem/Recommendations 1: seizure due to hypoglycemia   monitor closely       Problem/Recommendations 2:  hypoglycemia possible inadvertant dosing of insulin due to blindness.   Family should take over his medications   FS q 6 h   recent infection completed course of antibiotics.     ___________________________  Will follow with you.  Thank you,  Gabbie Raphael MD  Diplomate of the American Board of Neurology and Psychiatry.  Diplomate of the American Board of Vascular Neurology.   [facility] Neurological [facility] ([facility]), [facility]   Ph: [phone]    Differential diagnosis and plan of care discussed with patient after the evaluation.   Advanced care planning options discussed.   Pain assessed and judicious use of narcotics when appropriate was discussed.  Importance of Fall prevention discussed.  Counseling on Smoking and Alcohol cessation was offered when appropriate.  Counseling on Diet, exercise, and medication compliance was done.     62 minutes spent on the total encounter;  more than 50 % of the visit was spent on counseling  and or coordinating care by the attending physician.    Thank you for allowing me to participate in the care of this lynn patient. Please do not hesitate to call me if you have any questions.     This and subsequent notes were partially created using voice recognition software and will  inherently be subject to errors including those of syntax and sound alike substitutions which may escape proofreading. In such instances original meaning may be extrapolated by contextual derivation.

## 2019-03-30 NOTE — PROGRESS NOTE ADULT - SUBJECTIVE AND OBJECTIVE BOX
Patient is a 63y old  Male who presents with a chief complaint of seizure, hypoglycemia (29 Mar 2019 15:39)      INTERVAL HPI/OVERNIGHT EVENTS:  T(C): 36.6 (19 @ 12:51), Max: 37.1 (19 @ 04:49)  HR: 70 (19 @ 12:51) (66 - 85)  BP: 146/79 (19 @ 12:51) (120/77 - 180/90)  RR: 18 (19 @ 12:51) (16 - 18)  SpO2: 97% (19 @ 12:51) (96% - 100%)  Wt(kg): --  I&O's Summary    29 Mar 2019 07:  -  30 Mar 2019 07:00  --------------------------------------------------------  IN: 360 mL / OUT: 550 mL / NET: -190 mL    30 Mar 2019 07:01  -  30 Mar 2019 16:16  --------------------------------------------------------  IN: 380 mL / OUT: 200 mL / NET: 180 mL        LABS:                        11.1   3.88  )-----------( 210      ( 30 Mar 2019 11:02 )             37.5         142  |  107  |  21  ----------------------------<  208<H>  4.9   |  24  |  1.99<H>    Ca    9.3      30 Mar 2019 08:37  Phos  3.6       Mg     2.0         TPro  6.7  /  Alb  3.9  /  TBili  0.2  /  DBili  x   /  AST  24  /  ALT  33  /  AlkPhos  59        Urinalysis Basic - ( 29 Mar 2019 12:16 )    Color: Yellow / Appearance: Clear / S.029 / pH: x  Gluc: x / Ketone: Small  / Bili: Negative / Urobili: Negative   Blood: x / Protein: 30 mg/dL / Nitrite: Negative   Leuk Esterase: Negative / RBC: 2 /hpf / WBC 3 /HPF   Sq Epi: x / Non Sq Epi: 2 / Bacteria: Negative      CAPILLARY BLOOD GLUCOSE      POCT Blood Glucose.: 275 mg/dL (30 Mar 2019 12:46)  POCT Blood Glucose.: 193 mg/dL (30 Mar 2019 09:19)  POCT Blood Glucose.: 317 mg/dL (29 Mar 2019 22:43)  POCT Blood Glucose.: 267 mg/dL (29 Mar 2019 17:41)        Urinalysis Basic - ( 29 Mar 2019 12:16 )    Color: Yellow / Appearance: Clear / S.029 / pH: x  Gluc: x / Ketone: Small  / Bili: Negative / Urobili: Negative   Blood: x / Protein: 30 mg/dL / Nitrite: Negative   Leuk Esterase: Negative / RBC: 2 /hpf / WBC 3 /HPF   Sq Epi: x / Non Sq Epi: 2 / Bacteria: Negative        MEDICATIONS  (STANDING):  aspirin enteric coated 81 milliGRAM(s) Oral daily  atorvastatin 80 milliGRAM(s) Oral at bedtime  calcium carbonate   1250 mG (OsCal) 1 Tablet(s) Oral daily  dextrose 5%. 1000 milliLiter(s) (50 mL/Hr) IV Continuous <Continuous>  dextrose 50% Injectable 12.5 Gram(s) IV Push once  dextrose 50% Injectable 25 Gram(s) IV Push once  dextrose 50% Injectable 25 Gram(s) IV Push once  heparin  Injectable 5000 Unit(s) SubCutaneous every 8 hours  insulin glargine Injectable (LANTUS) 5 Unit(s) SubCutaneous at bedtime  insulin lispro (HumaLOG) corrective regimen sliding scale   SubCutaneous three times a day before meals  insulin lispro (HumaLOG) corrective regimen sliding scale   SubCutaneous at bedtime  labetalol 300 milliGRAM(s) Oral three times a day  mycophenolate mofetil 250 milliGRAM(s) Oral two times a day  NIFEdipine XL 60 milliGRAM(s) Oral daily  predniSONE   Tablet 5 milliGRAM(s) Oral daily  tacrolimus 3 milliGRAM(s) Oral every 12 hours  tamsulosin 0.4 milliGRAM(s) Oral at bedtime    MEDICATIONS  (PRN):  dextrose 40% Gel 15 Gram(s) Oral once PRN Blood Glucose LESS THAN 70 milliGRAM(s)/deciliter  docusate sodium 100 milliGRAM(s) Oral daily PRN Constipation  glucagon  Injectable 1 milliGRAM(s) IntraMuscular once PRN Glucose LESS THAN 70 milligrams/deciliter          PHYSICAL EXAM:  GENERAL: NAD, well-groomed, well-developed  HEAD:  Atraumatic, Normocephalic  CHEST/LUNG: Clear to percussion bilaterally; No rales, rhonchi, wheezing, or rubs  HEART: Regular rate and rhythm; No murmurs, rubs, or gallops  ABDOMEN: Soft, Nontender, Nondistended; Bowel sounds present  EXTREMITIES:  2+ Peripheral Pulses, No clubbing, cyanosis, or edema  LYMPH: No lymphadenopathy noted  SKIN: No rashes or lesions    Care Discussed with Consultants/Other Providers [ ] YES  [ ] NO

## 2019-03-31 LAB
GLUCOSE BLDC GLUCOMTR-MCNC: 178 MG/DL — HIGH (ref 70–99)
GLUCOSE BLDC GLUCOMTR-MCNC: 260 MG/DL — HIGH (ref 70–99)
GLUCOSE BLDC GLUCOMTR-MCNC: 280 MG/DL — HIGH (ref 70–99)
GLUCOSE BLDC GLUCOMTR-MCNC: 311 MG/DL — HIGH (ref 70–99)

## 2019-03-31 PROCEDURE — 99232 SBSQ HOSP IP/OBS MODERATE 35: CPT

## 2019-03-31 RX ORDER — INSULIN LISPRO 100/ML
2 VIAL (ML) SUBCUTANEOUS
Qty: 0 | Refills: 0 | Status: DISCONTINUED | OUTPATIENT
Start: 2019-03-31 | End: 2019-04-01

## 2019-03-31 RX ADMIN — Medication 1: at 09:29

## 2019-03-31 RX ADMIN — Medication 2 UNIT(S): at 18:04

## 2019-03-31 RX ADMIN — TACROLIMUS 3 MILLIGRAM(S): 5 CAPSULE ORAL at 17:16

## 2019-03-31 RX ADMIN — Medication 81 MILLIGRAM(S): at 11:28

## 2019-03-31 RX ADMIN — Medication 300 MILLIGRAM(S): at 21:50

## 2019-03-31 RX ADMIN — Medication 3: at 13:13

## 2019-03-31 RX ADMIN — INSULIN GLARGINE 5 UNIT(S): 100 INJECTION, SOLUTION SUBCUTANEOUS at 21:49

## 2019-03-31 RX ADMIN — MYCOPHENOLATE MOFETIL 250 MILLIGRAM(S): 250 CAPSULE ORAL at 05:30

## 2019-03-31 RX ADMIN — Medication 5 MILLIGRAM(S): at 05:30

## 2019-03-31 RX ADMIN — ATORVASTATIN CALCIUM 80 MILLIGRAM(S): 80 TABLET, FILM COATED ORAL at 21:48

## 2019-03-31 RX ADMIN — TAMSULOSIN HYDROCHLORIDE 0.4 MILLIGRAM(S): 0.4 CAPSULE ORAL at 21:50

## 2019-03-31 RX ADMIN — HEPARIN SODIUM 5000 UNIT(S): 5000 INJECTION INTRAVENOUS; SUBCUTANEOUS at 21:49

## 2019-03-31 RX ADMIN — Medication 60 MILLIGRAM(S): at 05:30

## 2019-03-31 RX ADMIN — TACROLIMUS 3 MILLIGRAM(S): 5 CAPSULE ORAL at 05:30

## 2019-03-31 RX ADMIN — Medication 300 MILLIGRAM(S): at 13:14

## 2019-03-31 RX ADMIN — Medication 2: at 21:50

## 2019-03-31 RX ADMIN — Medication 1 TABLET(S): at 11:28

## 2019-03-31 RX ADMIN — Medication 300 MILLIGRAM(S): at 05:30

## 2019-03-31 RX ADMIN — HEPARIN SODIUM 5000 UNIT(S): 5000 INJECTION INTRAVENOUS; SUBCUTANEOUS at 13:14

## 2019-03-31 RX ADMIN — HEPARIN SODIUM 5000 UNIT(S): 5000 INJECTION INTRAVENOUS; SUBCUTANEOUS at 05:30

## 2019-03-31 RX ADMIN — MYCOPHENOLATE MOFETIL 250 MILLIGRAM(S): 250 CAPSULE ORAL at 17:16

## 2019-03-31 RX ADMIN — Medication 3: at 18:04

## 2019-03-31 NOTE — CONSULT NOTE ADULT - REASON FOR ADMISSION
seizure, hypoglycemia

## 2019-03-31 NOTE — PROGRESS NOTE ADULT - SUBJECTIVE AND OBJECTIVE BOX
Patient is a 63y old  Male who presents with a chief complaint of seizure, hypoglycemia (31 Mar 2019 16:14)      INTERVAL HPI/OVERNIGHT EVENTS:  T(C): 36.6 (03-31-19 @ 17:11), Max: 36.7 (03-31-19 @ 01:05)  HR: 74 (03-31-19 @ 17:11) (66 - 74)  BP: 155/88 (03-31-19 @ 17:11) (150/78 - 159/87)  RR: 18 (03-31-19 @ 17:11) (18 - 18)  SpO2: 96% (03-31-19 @ 17:11) (96% - 99%)  Wt(kg): --  I&O's Summary    30 Mar 2019 07:01  -  31 Mar 2019 07:00  --------------------------------------------------------  IN: 1080 mL / OUT: 200 mL / NET: 880 mL    31 Mar 2019 07:01  -  31 Mar 2019 18:54  --------------------------------------------------------  IN: 800 mL / OUT: 0 mL / NET: 800 mL        LABS:                        11.1   3.88  )-----------( 210      ( 30 Mar 2019 11:02 )             37.5     03-30    142  |  107  |  21  ----------------------------<  208<H>  4.9   |  24  |  1.99<H>    Ca    9.3      30 Mar 2019 08:37          CAPILLARY BLOOD GLUCOSE      POCT Blood Glucose.: 280 mg/dL (31 Mar 2019 17:56)  POCT Blood Glucose.: 260 mg/dL (31 Mar 2019 13:02)  POCT Blood Glucose.: 178 mg/dL (31 Mar 2019 09:18)  POCT Blood Glucose.: 365 mg/dL (30 Mar 2019 21:27)            MEDICATIONS  (STANDING):  aspirin enteric coated 81 milliGRAM(s) Oral daily  atorvastatin 80 milliGRAM(s) Oral at bedtime  calcium carbonate   1250 mG (OsCal) 1 Tablet(s) Oral daily  dextrose 5%. 1000 milliLiter(s) (50 mL/Hr) IV Continuous <Continuous>  dextrose 50% Injectable 12.5 Gram(s) IV Push once  dextrose 50% Injectable 25 Gram(s) IV Push once  dextrose 50% Injectable 25 Gram(s) IV Push once  heparin  Injectable 5000 Unit(s) SubCutaneous every 8 hours  insulin glargine Injectable (LANTUS) 5 Unit(s) SubCutaneous at bedtime  insulin lispro (HumaLOG) corrective regimen sliding scale   SubCutaneous three times a day before meals  insulin lispro (HumaLOG) corrective regimen sliding scale   SubCutaneous at bedtime  insulin lispro Injectable (HumaLOG) 2 Unit(s) SubCutaneous three times a day before meals  labetalol 300 milliGRAM(s) Oral three times a day  mycophenolate mofetil 250 milliGRAM(s) Oral two times a day  NIFEdipine XL 60 milliGRAM(s) Oral daily  predniSONE   Tablet 5 milliGRAM(s) Oral daily  tacrolimus 3 milliGRAM(s) Oral every 12 hours  tamsulosin 0.4 milliGRAM(s) Oral at bedtime    MEDICATIONS  (PRN):  dextrose 40% Gel 15 Gram(s) Oral once PRN Blood Glucose LESS THAN 70 milliGRAM(s)/deciliter  docusate sodium 100 milliGRAM(s) Oral daily PRN Constipation  glucagon  Injectable 1 milliGRAM(s) IntraMuscular once PRN Glucose LESS THAN 70 milligrams/deciliter          PHYSICAL EXAM:  GENERAL: NAD, well-groomed, well-developed  HEAD:  Atraumatic, Normocephalic  CHEST/LUNG: Clear to percussion bilaterally; No rales, rhonchi, wheezing, or rubs  HEART: Regular rate and rhythm; No murmurs, rubs, or gallops  ABDOMEN: Soft, Nontender, Nondistended; Bowel sounds present  EXTREMITIES:  2+ Peripheral Pulses, No clubbing, cyanosis, or edema  LYMPH: No lymphadenopathy noted  SKIN: No rashes or lesions    Care Discussed with Consultants/Other Providers [ ] YES  [ ] NO

## 2019-03-31 NOTE — PROGRESS NOTE ADULT - ASSESSMENT
63 year old male with history of DM Type II, diabetic retinopathy, s/p kidney transplant in 2011, recent admission and discharge from [facility] for high grade coag neg staph bacteremia and pyelonephritis (s/p 2 week course of vancomycin, recently completed, PICC removed 2 days ago), presenting from home with seizure in the setting of hypoglycemia.    Problem/Plan - 1:  ·  Problem: Seizure.  Plan: -due to hypoglycemia, now resolved  -continue to manage hypoglycemia as below  -neurochecks q4hrs for now.     Problem/Plan - 2:  ·  Problem: Hypoglycemia.  Plan: -pt may have inadvertently taken higher than prescribed amount of novolog   -will hold all insulin for now and monitor fingersticks  -consult endocrine  -diabetic diet.     Problem/Plan - 3:  ·  Problem: Kidney transplant recipient.  Plan: -Cr today is at baseline, ~1.6-1.8 as per renal note from prior admission   -Continue with tacro and prednisone   -renal fu

## 2019-03-31 NOTE — PROGRESS NOTE ADULT - SUBJECTIVE AND OBJECTIVE BOX
Follow Up:      Inverval History/ROS:Patient is a 63y old  Male who presents with a chief complaint of seizure, hypoglycemia (31 Mar 2019 10:02)      Allergies    No Known Drug Allergies  Seafood (Pruritus; Rash)    Intolerances        ANTIMICROBIALS:      OTHER MEDS:  aspirin enteric coated 81 milliGRAM(s) Oral daily  atorvastatin 80 milliGRAM(s) Oral at bedtime  calcium carbonate   1250 mG (OsCal) 1 Tablet(s) Oral daily  dextrose 40% Gel 15 Gram(s) Oral once PRN  dextrose 5%. 1000 milliLiter(s) IV Continuous <Continuous>  dextrose 50% Injectable 12.5 Gram(s) IV Push once  dextrose 50% Injectable 25 Gram(s) IV Push once  dextrose 50% Injectable 25 Gram(s) IV Push once  docusate sodium 100 milliGRAM(s) Oral daily PRN  glucagon  Injectable 1 milliGRAM(s) IntraMuscular once PRN  heparin  Injectable 5000 Unit(s) SubCutaneous every 8 hours  insulin glargine Injectable (LANTUS) 5 Unit(s) SubCutaneous at bedtime  insulin lispro (HumaLOG) corrective regimen sliding scale   SubCutaneous three times a day before meals  insulin lispro (HumaLOG) corrective regimen sliding scale   SubCutaneous at bedtime  labetalol 300 milliGRAM(s) Oral three times a day  mycophenolate mofetil 250 milliGRAM(s) Oral two times a day  NIFEdipine XL 60 milliGRAM(s) Oral daily  predniSONE   Tablet 5 milliGRAM(s) Oral daily  tacrolimus 3 milliGRAM(s) Oral every 12 hours  tamsulosin 0.4 milliGRAM(s) Oral at bedtime      Vital Signs Last 24 Hrs  T(C): 36.6 (31 Mar 2019 09:05), Max: 36.7 (31 Mar 2019 01:05)  T(F): 97.8 (31 Mar 2019 09:05), Max: 98.1 (31 Mar 2019 01:05)  HR: 72 (31 Mar 2019 09:05) (66 - 72)  BP: 159/87 (31 Mar 2019 09:05) (146/79 - 162/88)  BP(mean): --  RR: 18 (31 Mar 2019 09:05) (18 - 18)  SpO2: 99% (31 Mar 2019 09:05) (95% - 99%)    PHYSICAL EXAM:  General: [ ] non-toxic  HEAD/EYES: [ ] PERRL [ ] white sclera [ ] icterus  ENT:  [ ] normal [ ] supple [ ] thrush [ ] pharyngeal exudate  Cardiovascular:   [ ] murmur [ ] normal [ ] PPM/AICD  Respiratory:  [ ] clear to ausculation bilaterally  GI:  [ ] soft, non-tender, normal bowel sounds  :  [ ] gilmore [ ] no CVA tenderness   Musculoskeletal:  [ ] no synovitis  Neurologic:  [ ] non-focal exam   Skin:  [ ] no rash  Lymph: [ ] no lymphadenopathy  Psychiatric:  [ ] appropriate affect [ ] alert & oriented  Lines:  [ ] no phlebitis [ ] central line                                11.1   3.88  )-----------( 210      ( 30 Mar 2019 11:02 )             37.5       03-30    142  |  107  |  21  ----------------------------<  208<H>  4.9   |  24  |  1.99<H>    Ca    9.3      30 Mar 2019 08:37            MICROBIOLOGY:Culture Results:   <10,000 CFU/mL Normal Urogenital Pamela (03-29-19 @ 16:58)  Culture Results:   No growth to date. (03-29-19 @ 06:51)  Culture Results:   No growth to date. (03-29-19 @ 06:51)      RADIOLOGY: Follow Up:      Inverval History/ROS:Patient is a 63y old  Male who presents with a chief complaint of seizure, hypoglycemia (31 Mar 2019 10:02)    No fever.  Feels better.    Allergies    No Known Drug Allergies  Seafood (Pruritus; Rash)    Intolerances        ANTIMICROBIALS:      OTHER MEDS:  aspirin enteric coated 81 milliGRAM(s) Oral daily  atorvastatin 80 milliGRAM(s) Oral at bedtime  calcium carbonate   1250 mG (OsCal) 1 Tablet(s) Oral daily  dextrose 40% Gel 15 Gram(s) Oral once PRN  dextrose 5%. 1000 milliLiter(s) IV Continuous <Continuous>  dextrose 50% Injectable 12.5 Gram(s) IV Push once  dextrose 50% Injectable 25 Gram(s) IV Push once  dextrose 50% Injectable 25 Gram(s) IV Push once  docusate sodium 100 milliGRAM(s) Oral daily PRN  glucagon  Injectable 1 milliGRAM(s) IntraMuscular once PRN  heparin  Injectable 5000 Unit(s) SubCutaneous every 8 hours  insulin glargine Injectable (LANTUS) 5 Unit(s) SubCutaneous at bedtime  insulin lispro (HumaLOG) corrective regimen sliding scale   SubCutaneous three times a day before meals  insulin lispro (HumaLOG) corrective regimen sliding scale   SubCutaneous at bedtime  labetalol 300 milliGRAM(s) Oral three times a day  mycophenolate mofetil 250 milliGRAM(s) Oral two times a day  NIFEdipine XL 60 milliGRAM(s) Oral daily  predniSONE   Tablet 5 milliGRAM(s) Oral daily  tacrolimus 3 milliGRAM(s) Oral every 12 hours  tamsulosin 0.4 milliGRAM(s) Oral at bedtime      Vital Signs Last 24 Hrs  T(C): 36.6 (31 Mar 2019 09:05), Max: 36.7 (31 Mar 2019 01:05)  T(F): 97.8 (31 Mar 2019 09:05), Max: 98.1 (31 Mar 2019 01:05)  HR: 72 (31 Mar 2019 09:05) (66 - 72)  BP: 159/87 (31 Mar 2019 09:05) (146/79 - 162/88)  BP(mean): --  RR: 18 (31 Mar 2019 09:05) (18 - 18)  SpO2: 99% (31 Mar 2019 09:05) (95% - 99%)    PHYSICAL EXAM:  General: [x ] non-toxic  HEAD/EYES: [ ] PERRL [x ] white sclera [ ] icterus  ENT:  [ ] normal [x ] supple [ ] thrush [ ] pharyngeal exudate  Cardiovascular:   [ ] murmur [ ] normal [ ] PPM/AICD  Respiratory:  [x ] clear to ausculation bilaterally  GI:  [ x] soft, non-tender, normal bowel sounds  :  [ ] gilmore [ xx] no CVA tenderness   Musculoskeletal:  [ ] no synovitis  Neurologic:  [ ] non-focal exam   Skin:  [ ] no rash  Lymph: [ ] no lymphadenopathy  Psychiatric:  [ ] appropriate affect [x ] alert & oriented  Lines:  [ x] no phlebitis [ ] central line                                11.1   3.88  )-----------( 210      ( 30 Mar 2019 11:02 )             37.5       03-30    142  |  107  |  21  ----------------------------<  208<H>  4.9   |  24  |  1.99<H>    Ca    9.3      30 Mar 2019 08:37            MICROBIOLOGY:Culture Results:   <10,000 CFU/mL Normal Urogenital Pamela (03-29-19 @ 16:58)  Culture Results:   No growth to date. (03-29-19 @ 06:51)  Culture Results:   No growth to date. (03-29-19 @ 06:51)    Culture - Blood (03.29.19 @ 06:51)    Specimen Source: .Blood Blood    Culture Results:   No growth to date.      RADIOLOGY:

## 2019-03-31 NOTE — CONSULT NOTE ADULT - SUBJECTIVE AND OBJECTIVE BOX
CHIEF COMPLAINT:Patient is a 63y old  Male who presents with a chief complaint of seizure, hypoglycemia (30 Mar 2019 18:48)      HISTORY OF PRESENT ILLNESS:    pt known to me from last admission with history as below admitted with sz like activity and hypoglycemia   no sp   no sob   mildly confused       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 and limited vision in left eye  Bell's palsy: 2004  ESRD (end stage renal disease): s/p renal transplant  Type 2 diabetes mellitus  Obesity  Hypertension  Dyslipidemia  Diabetic neuropathy associated with type 2 diabetes mellitus  Benign prostatic hypertrophy  History of detached retina repair: right eye  S/P TURP  S/P kidney transplant: 2011          MEDICATIONS:  aspirin enteric coated 81 milliGRAM(s) Oral daily  heparin  Injectable 5000 Unit(s) SubCutaneous every 8 hours  labetalol 300 milliGRAM(s) Oral three times a day  NIFEdipine XL 60 milliGRAM(s) Oral daily  tamsulosin 0.4 milliGRAM(s) Oral at bedtime          docusate sodium 100 milliGRAM(s) Oral daily PRN    atorvastatin 80 milliGRAM(s) Oral at bedtime  dextrose 40% Gel 15 Gram(s) Oral once PRN  dextrose 50% Injectable 12.5 Gram(s) IV Push once  dextrose 50% Injectable 25 Gram(s) IV Push once  dextrose 50% Injectable 25 Gram(s) IV Push once  glucagon  Injectable 1 milliGRAM(s) IntraMuscular once PRN  insulin glargine Injectable (LANTUS) 5 Unit(s) SubCutaneous at bedtime  insulin lispro (HumaLOG) corrective regimen sliding scale   SubCutaneous three times a day before meals  insulin lispro (HumaLOG) corrective regimen sliding scale   SubCutaneous at bedtime  predniSONE   Tablet 5 milliGRAM(s) Oral daily    calcium carbonate   1250 mG (OsCal) 1 Tablet(s) Oral daily  dextrose 5%. 1000 milliLiter(s) IV Continuous <Continuous>  mycophenolate mofetil 250 milliGRAM(s) Oral two times a day  tacrolimus 3 milliGRAM(s) Oral every 12 hours      FAMILY HISTORY:  No pertinent family history in first degree relatives      Non-contributory    SOCIAL HISTORY:    No tobacco, drugs or etoh    Allergies    No Known Drug Allergies  Seafood (Pruritus; Rash)    Intolerances    	    REVIEW OF SYSTEMS:  as above  The rest of the 14 points ROS reviewed and except above they are unremarkable.        PHYSICAL EXAM:  T(C): 36.6 (03-31-19 @ 09:05), Max: 36.7 (03-31-19 @ 01:05)  HR: 72 (03-31-19 @ 09:05) (66 - 72)  BP: 159/87 (03-31-19 @ 09:05) (146/79 - 162/88)  RR: 18 (03-31-19 @ 09:05) (18 - 18)  SpO2: 99% (03-31-19 @ 09:05) (95% - 99%)  Wt(kg): --  I&O's Summary    30 Mar 2019 07:01  -  31 Mar 2019 07:00  --------------------------------------------------------  IN: 1080 mL / OUT: 200 mL / NET: 880 mL    31 Mar 2019 07:01  -  31 Mar 2019 10:02  --------------------------------------------------------  IN: 240 mL / OUT: 0 mL / NET: 240 mL      JVP: Normal  Neck: supple  Lung: clear   CV: S1 S2 , Murmur:  Abd: soft  Ext: No edema  neuro: Awake / alert  Psych: flat affect  Skin: normal      LABS/DATA:    TELEMETRY: 	    ECG:  	   	  CARDIAC MARKERS:                                      11.1   3.88  )-----------( 210      ( 30 Mar 2019 11:02 )             37.5     03-30    142  |  107  |  21  ----------------------------<  208<H>  4.9   |  24  |  1.99<H>    Ca    9.3      30 Mar 2019 08:37      proBNP:   Lipid Profile:   HgA1c:   TSH:       < from: Transthoracic Echocardiogram (03.14.19 @ 16:19) >  Conclusions:  1. Hyperdynamic left ventricular systolic function.  2. The right ventricle is not well visualized; grossly  normal right ventricular systolic function.  Unable to rule out endocarditis, consider JAMMIE if clinically  indicated.    < end of copied text >

## 2019-03-31 NOTE — PROGRESS NOTE ADULT - ASSESSMENT
63 year old male with history of DM Type II 9.2, diabetic retinopathy, s/p kidney transplant in 2011, recent admission and discharge from [facility] for high grade coag neg staph bacteremia and pyelonephritis (s/p 2 week course of vancomycin, recently completed) presenting from home with seizure in the setting of hypoglycemia.  pt is legally blind and it is believed pt took the Novolog instead of levemir at night and had resultant hypoglycemia.    #Type 2 DM with complications   no need for stringent control after hypoglycemia episode with seizure   Blood glucose goal 140-200  Continue Lantus 5U HS and start Humalog 2U TIDAC plus low dose scale    #HLD  continue statin, check Lipid panel fasting to assess TG level if safe to use DPP4    #HTN   goal BP in DM <130/80, as per primary team

## 2019-03-31 NOTE — PROGRESS NOTE ADULT - SUBJECTIVE AND OBJECTIVE BOX
Chief Complaint: T2DM    History: Patient has been hyperglycemic in 200s-300s    MEDICATIONS  (STANDING):  aspirin enteric coated 81 milliGRAM(s) Oral daily  atorvastatin 80 milliGRAM(s) Oral at bedtime  calcium carbonate   1250 mG (OsCal) 1 Tablet(s) Oral daily  dextrose 5%. 1000 milliLiter(s) (50 mL/Hr) IV Continuous <Continuous>  dextrose 50% Injectable 12.5 Gram(s) IV Push once  dextrose 50% Injectable 25 Gram(s) IV Push once  dextrose 50% Injectable 25 Gram(s) IV Push once  heparin  Injectable 5000 Unit(s) SubCutaneous every 8 hours  insulin glargine Injectable (LANTUS) 5 Unit(s) SubCutaneous at bedtime  insulin lispro (HumaLOG) corrective regimen sliding scale   SubCutaneous three times a day before meals  insulin lispro (HumaLOG) corrective regimen sliding scale   SubCutaneous at bedtime  labetalol 300 milliGRAM(s) Oral three times a day  mycophenolate mofetil 250 milliGRAM(s) Oral two times a day  NIFEdipine XL 60 milliGRAM(s) Oral daily  predniSONE   Tablet 5 milliGRAM(s) Oral daily  tacrolimus 3 milliGRAM(s) Oral every 12 hours  tamsulosin 0.4 milliGRAM(s) Oral at bedtime    MEDICATIONS  (PRN):  dextrose 40% Gel 15 Gram(s) Oral once PRN Blood Glucose LESS THAN 70 milliGRAM(s)/deciliter  docusate sodium 100 milliGRAM(s) Oral daily PRN Constipation  glucagon  Injectable 1 milliGRAM(s) IntraMuscular once PRN Glucose LESS THAN 70 milligrams/deciliter      Allergies    No Known Drug Allergies  Seafood (Pruritus; Rash)    Intolerances      Review of Systems:  Constitutional: No fever  Eyes: No blurry vision  Neuro: No tremors  HEENT: No pain  Cardiovascular: No chest pain, palpitations  Respiratory: No SOB, no cough  GI: No nausea, vomiting, abdominal pain  : No dysuria  Skin: no rash  Psych: no depression  Endocrine: no polyuria, polydipsia  Hem/lymph: no swelling  Osteoporosis: no fractures    ALL OTHER SYSTEMS REVIEWED AND NEGATIVE        PHYSICAL EXAM:  VITALS: T(C): 36.6 (03-31-19 @ 13:39)  T(F): 97.9 (03-31-19 @ 13:39), Max: 98.1 (03-31-19 @ 01:05)  HR: 67 (03-31-19 @ 13:39) (66 - 72)  BP: 153/77 (03-31-19 @ 13:39) (150/78 - 162/88)  RR:  (18 - 18)  SpO2:  (95% - 99%)  Wt(kg): --  GENERAL: NAD, well-groomed, well-developed  EYES: No proptosis, no lid lag, anicteric  HEENT:  Atraumatic, Normocephalic, moist mucous membranes  THYROID: Normal size, no palpable nodules  RESPIRATORY: Clear to auscultation bilaterally; No rales, rhonchi, wheezing, or rubs  CARDIOVASCULAR: Regular rate and rhythm; No murmurs; no peripheral edema  GI: Soft, nontender, non distended, normal bowel sounds  SKIN: Dry, intact, No rashes or lesions  MUSCULOSKELETAL: Full range of motion, normal strength  NEURO: sensation intact, extraocular movements intact, no tremor, normal reflexes  PSYCH: Alert and oriented x 3, normal affect, normal mood  CUSHING'S SIGNS: no striae    POCT Blood Glucose.: 260 mg/dL (03-31-19 @ 13:02)  POCT Blood Glucose.: 178 mg/dL (03-31-19 @ 09:18)  POCT Blood Glucose.: 365 mg/dL (03-30-19 @ 21:27)  POCT Blood Glucose.: 290 mg/dL (03-30-19 @ 18:07)  POCT Blood Glucose.: 275 mg/dL (03-30-19 @ 12:46)  POCT Blood Glucose.: 193 mg/dL (03-30-19 @ 09:19)  POCT Blood Glucose.: 317 mg/dL (03-29-19 @ 22:43)  POCT Blood Glucose.: 267 mg/dL (03-29-19 @ 17:41)  POCT Blood Glucose.: 204 mg/dL (03-29-19 @ 14:18)  POCT Blood Glucose.: 232 mg/dL (03-29-19 @ 13:04)  POCT Blood Glucose.: 171 mg/dL (03-29-19 @ 09:45)  POCT Blood Glucose.: 151 mg/dL (03-29-19 @ 07:33)  POCT Blood Glucose.: 125 mg/dL (03-29-19 @ 05:15)  POCT Blood Glucose.: 129 mg/dL (03-29-19 @ 04:02)  POCT Blood Glucose.: 123 mg/dL (03-29-19 @ 03:18)  POCT Blood Glucose.: 112 mg/dL (03-29-19 @ 02:44)  POCT Blood Glucose.: 103 mg/dL (03-29-19 @ 02:09)      03-30    142  |  107  |  21  ----------------------------<  208<H>  4.9   |  24  |  1.99<H>    EGFR if : 40<L>  EGFR if non : 34<L>    Ca    9.3      03-30  Mg     2.0     03-29  Phos  3.6     03-29    TPro  6.7  /  Alb  3.9  /  TBili  0.2  /  DBili  x   /  AST  24  /  ALT  33  /  AlkPhos  59  03-29             Hemoglobin A1C, Whole Blood: 9.2 % <H> [4.0 - 5.6] (03-09-19 @ 10:23)

## 2019-03-31 NOTE — PROGRESS NOTE ADULT - ASSESSMENT
63 year old male PMH DM Type II, diabetic retinopathy, s/p kidney transplant in 2011 from [facility] who presented to [facility] on 3/29/19 hypoglycemia and seizure. Recent admission 3/8/19 - 3/18/19 for fever and found to have CoNS bacteremia. Patient also with transplant kidney stranding on imaging which was the suspected source. TTE negative for vegetations. Patient was started on Vancomycin and completed 2 week course ending 3/21/19. Patient's PICC line was removed 2 days prior to admission. On presentation afebrile, tachycardic to > 90 but normotensive. WBC 4.1 with 72%. Lactic acid of 2.2. U/A with 3 WBC. Blood cultures negative at 24 hours    Overall, no evidence of recurrence of infection as etiology of hypoglycemia or seizure. Would follow the blood cultures and monitor off of antibiotics    1) bacteremia:  s/p tx  repeat blood culture without growth    Observe off abx    2) hypoglycemia:  not likely ude ot infection    Management per primary team    Will sign off.  Call ID service with additional questions

## 2019-04-01 ENCOUNTER — TRANSCRIPTION ENCOUNTER (OUTPATIENT)
Age: 64
End: 2019-04-01

## 2019-04-01 VITALS
HEART RATE: 77 BPM | SYSTOLIC BLOOD PRESSURE: 126 MMHG | TEMPERATURE: 97 F | DIASTOLIC BLOOD PRESSURE: 78 MMHG | OXYGEN SATURATION: 97 % | RESPIRATION RATE: 18 BRPM

## 2019-04-01 LAB
GLUCOSE BLDC GLUCOMTR-MCNC: 221 MG/DL — HIGH (ref 70–99)
GLUCOSE BLDC GLUCOMTR-MCNC: 247 MG/DL — HIGH (ref 70–99)

## 2019-04-01 PROCEDURE — 99233 SBSQ HOSP IP/OBS HIGH 50: CPT

## 2019-04-01 PROCEDURE — 83605 ASSAY OF LACTIC ACID: CPT

## 2019-04-01 PROCEDURE — 87086 URINE CULTURE/COLONY COUNT: CPT

## 2019-04-01 PROCEDURE — 81001 URINALYSIS AUTO W/SCOPE: CPT

## 2019-04-01 PROCEDURE — 84132 ASSAY OF SERUM POTASSIUM: CPT

## 2019-04-01 PROCEDURE — 80053 COMPREHEN METABOLIC PANEL: CPT

## 2019-04-01 PROCEDURE — 82947 ASSAY GLUCOSE BLOOD QUANT: CPT

## 2019-04-01 PROCEDURE — 99285 EMERGENCY DEPT VISIT HI MDM: CPT

## 2019-04-01 PROCEDURE — 82962 GLUCOSE BLOOD TEST: CPT

## 2019-04-01 PROCEDURE — 82803 BLOOD GASES ANY COMBINATION: CPT

## 2019-04-01 PROCEDURE — 83735 ASSAY OF MAGNESIUM: CPT

## 2019-04-01 PROCEDURE — 82330 ASSAY OF CALCIUM: CPT

## 2019-04-01 PROCEDURE — 85014 HEMATOCRIT: CPT

## 2019-04-01 PROCEDURE — 87040 BLOOD CULTURE FOR BACTERIA: CPT

## 2019-04-01 PROCEDURE — 85027 COMPLETE CBC AUTOMATED: CPT

## 2019-04-01 PROCEDURE — 84100 ASSAY OF PHOSPHORUS: CPT

## 2019-04-01 PROCEDURE — 80197 ASSAY OF TACROLIMUS: CPT

## 2019-04-01 PROCEDURE — 82435 ASSAY OF BLOOD CHLORIDE: CPT

## 2019-04-01 PROCEDURE — 84295 ASSAY OF SERUM SODIUM: CPT

## 2019-04-01 PROCEDURE — 80048 BASIC METABOLIC PNL TOTAL CA: CPT

## 2019-04-01 RX ORDER — INSULIN ASPART 100 [IU]/ML
10 INJECTION, SOLUTION SUBCUTANEOUS
Qty: 0 | Refills: 0 | COMMUNITY

## 2019-04-01 RX ORDER — INSULIN GLARGINE 100 [IU]/ML
8 INJECTION, SOLUTION SUBCUTANEOUS AT BEDTIME
Qty: 0 | Refills: 0 | Status: DISCONTINUED | OUTPATIENT
Start: 2019-04-01 | End: 2019-04-01

## 2019-04-01 RX ORDER — INSULIN LISPRO 100/ML
5 VIAL (ML) SUBCUTANEOUS
Qty: 0 | Refills: 0 | Status: DISCONTINUED | OUTPATIENT
Start: 2019-04-01 | End: 2019-04-01

## 2019-04-01 RX ADMIN — HEPARIN SODIUM 5000 UNIT(S): 5000 INJECTION INTRAVENOUS; SUBCUTANEOUS at 06:52

## 2019-04-01 RX ADMIN — Medication 60 MILLIGRAM(S): at 06:53

## 2019-04-01 RX ADMIN — Medication 5 MILLIGRAM(S): at 06:52

## 2019-04-01 RX ADMIN — Medication 2 UNIT(S): at 12:28

## 2019-04-01 RX ADMIN — Medication 2 UNIT(S): at 08:27

## 2019-04-01 RX ADMIN — MYCOPHENOLATE MOFETIL 250 MILLIGRAM(S): 250 CAPSULE ORAL at 06:52

## 2019-04-01 RX ADMIN — Medication 2: at 08:27

## 2019-04-01 RX ADMIN — Medication 2: at 12:28

## 2019-04-01 RX ADMIN — Medication 300 MILLIGRAM(S): at 06:52

## 2019-04-01 RX ADMIN — Medication 1 TABLET(S): at 11:30

## 2019-04-01 RX ADMIN — TACROLIMUS 3 MILLIGRAM(S): 5 CAPSULE ORAL at 06:53

## 2019-04-01 RX ADMIN — Medication 81 MILLIGRAM(S): at 11:30

## 2019-04-01 NOTE — PROGRESS NOTE ADULT - SUBJECTIVE AND OBJECTIVE BOX
[facility]      Seen earlier today, and examined.  - Today, patient is without complaints.           *****MEDICATIONS: Current medication reviewed and documented.    MEDICATIONS  (STANDING):  aspirin enteric coated 81 milliGRAM(s) Oral daily  atorvastatin 80 milliGRAM(s) Oral at bedtime  calcium carbonate   1250 mG (OsCal) 1 Tablet(s) Oral daily  dextrose 5%. 1000 milliLiter(s) (50 mL/Hr) IV Continuous <Continuous>  dextrose 50% Injectable 12.5 Gram(s) IV Push once  dextrose 50% Injectable 25 Gram(s) IV Push once  dextrose 50% Injectable 25 Gram(s) IV Push once  heparin  Injectable 5000 Unit(s) SubCutaneous every 8 hours  insulin glargine Injectable (LANTUS) 8 Unit(s) SubCutaneous at bedtime  insulin lispro (HumaLOG) corrective regimen sliding scale   SubCutaneous three times a day before meals  insulin lispro (HumaLOG) corrective regimen sliding scale   SubCutaneous at bedtime  insulin lispro Injectable (HumaLOG) 5 Unit(s) SubCutaneous three times a day before meals  labetalol 300 milliGRAM(s) Oral three times a day  mycophenolate mofetil 250 milliGRAM(s) Oral two times a day  NIFEdipine XL 60 milliGRAM(s) Oral daily  predniSONE   Tablet 5 milliGRAM(s) Oral daily  tacrolimus 3 milliGRAM(s) Oral every 12 hours  tamsulosin 0.4 milliGRAM(s) Oral at bedtime    MEDICATIONS  (PRN):  dextrose 40% Gel 15 Gram(s) Oral once PRN Blood Glucose LESS THAN 70 milliGRAM(s)/deciliter  docusate sodium 100 milliGRAM(s) Oral daily PRN Constipation  glucagon  Injectable 1 milliGRAM(s) IntraMuscular once PRN Glucose LESS THAN 70 milligrams/deciliter          ***** VITAL SIGNS:  T(F): 97.1 (04-01-19 @ 13:31), Max: 98.3 (03-31-19 @ 21:41)  HR: 77 (04-01-19 @ 13:31) (66 - 77)  BP: 126/78 (04-01-19 @ 13:31) (126/78 - 155/88)  RR: 18 (04-01-19 @ 13:31) (18 - 18)  SpO2: 97% (04-01-19 @ 13:31) (96% - 99%)  Wt(kg): --  ,   I&O's Summary    31 Mar 2019 07:01  -  01 Apr 2019 07:00  --------------------------------------------------------  IN: 980 mL / OUT: 0 mL / NET: 980 mL    01 Apr 2019 07:01  -  01 Apr 2019 17:07  --------------------------------------------------------  IN: 780 mL / OUT: 0 mL / NET: 780 mL             *****PHYSICAL EXAM:   Attention comprehension are ok  Able to name, repeat,  without any difficulty.   Able to follow 3 step commands.  legally blind      EOMI fundi not visualized,   unable to do visual fields.  No facial asymmetry   Tongue is midline   Palate elevates symmetrically   Moving all 4 ext symmetrically no pronator drift   Reflexes are diminished  throughout   sensation is grossly symmetric  Gait : not assessed.  B/L down going toes        *****LAB AND IMAGING:                                         [All pertinent recent Imaging/Reports reviewed]           *****A S S E S S M E N T   A N D   P L A N :      63 year old male with history of DM Type II, diabetic retinopathy, s/p kidney transplant in 2011, recent admission and discharge from [facility] for high grade coag neg staph bacteremia and pyelonephritis (s/p 2 week course of vancomycin, recently completed, PICC removed 2 days ago), presenting from home with seizure in the setting of hypoglycemia. The patient states that his blood glucose levels were running high after his discharge from the hospital. He went to eat at [employer] yesterday, and he had only salad and breadsticks from there along with 4 pieces of ravioli. His glucose when he got home was 226. He took 5 units of novolog at that time. He had antonino crackers, jello, and peaches before bed. And he took his usual 10 units of levemir. However, the patient states that he is blind and that he may have mistaken the novolog for the levemir. He usually measures out the insulin by listening for the clicks of the pen, and he can tell the 2 insulin pens apart by their sizes/shape. After taking the insulin he fell asleep and his wife woke up when she noticed that he was trembling. She tried to wake him up but was unsuccessful. She noticed that he was trembling all over. A family member called 911, and EMS revived him with D50 after they arrived on scene. He gained consciousness immediately after receiving the D50 per family.          Problem/Recommendations 1: seizure due to hypoglycemia   monitor closely   pt does not drive or operate heavy machinery. 1st seizure likely provoked no indication for treatment would just monitor closely.       Problem/Recommendations 2:  hypoglycemia possible inadvertant dosing of insulin due to blindness.   Family should take over his medications regimen, discussed with wife at bedside who is in agreement.   f/u with endo       Thank you for allowing me to participate in the care of this patient. Please do not hesitate to call me if you have any  questions.        ________________  Gabbie Raphael MD  [facility]  [phone]      30 minutes spent on total encounter; more than 50 % of the visit was  spent counseling about plan of care, compliance to diet/exercise and medication regimen and or  coordinating care by the attending physician.      It is advised that s stroke patients follow up with YAHAIRA Leos @ [phone] in 1- 2 weeks.   Others please follow up with Dr. Michael Nissenbaum [phone]

## 2019-04-01 NOTE — DISCHARGE NOTE PROVIDER - PROVIDER TOKENS
PROVIDER:[TOKEN:[[id number]],FOLLOWUP:[1 week]],FREE:[LAST:[renal transplant team],PHONE:[(   )    -],FAX:[(   )    -],FOLLOWUP:[1 week]],FREE:[LAST:[endocrinologist],PHONE:[(   )    -],FAX:[(   )    -],FOLLOWUP:[1 week]]

## 2019-04-01 NOTE — PROGRESS NOTE ADULT - REASON FOR ADMISSION
seizure, hypoglycemia

## 2019-04-01 NOTE — PROGRESS NOTE ADULT - SUBJECTIVE AND OBJECTIVE BOX
DIABETES FOLLOW UP NOTE: Saw pt earlier today  INTERVAL HX: 64 y/o M w/h/o uncontrolled T2DM  c/b retinopathy>legally blind/nephropathy >s/p renal tx in 20111. Pt had recent  pyelonephritis with bacteremia requiring hospitalization. Pt states he changed diet and was eating more healthy with BG improved and mostly in 100s. However,  night prior to admission he went to restaurant and forgot to bring Novolog insulin pen with him. Pt had some salad with bread sticks and went he returned home he checked BG to be in 200s and took 10 units of Novolog. However, he also had the Levemir insulin pen with him and can't recall if he took Levemir insulin with the Novolog or got confused and took Novolog twice. Pt states he had the 2 pens in his hands at the same time and might had gotten confused. Pt was found by wife unresponsive and with seizure like movements who called EMS. Pt found to have hypoglycemia and was admitted to hospital. Pt reports feeling much better and ready to go home today. BGs are now above goal (>200s) since pt is getting less than 50% of total insulin doses he takes at home. No further hypoglycemia noted. Primary team ready to discharge pt today.      Review of Systems:  General: No complaints  Cardiovascular: No chest pain, palpitations  Respiratory: No SOB, no cough  GI: No nausea, vomiting, abdominal pain  Endocrine: no polyuria, polydipsia or S&Sx of hypoglycemia    Allergies    No Known Drug Allergies  Seafood (Pruritus; Rash)    Intolerances      MEDICATIONS:  atorvastatin 80 milliGRAM(s) Oral at bedtime  calcium carbonate   1250 mG (OsCal) 1 Tablet(s) Oral daily  insulin glargine Injectable (LANTUS) 8 Unit(s) SubCutaneous at bedtime  insulin lispro (HumaLOG) corrective regimen sliding scale   SubCutaneous three times a day before meals  insulin lispro (HumaLOG) corrective regimen sliding scale   SubCutaneous at bedtime  insulin lispro Injectable (HumaLOG) 5 Unit(s) SubCutaneous three times a day before meals  predniSONE   Tablet 5 milliGRAM(s) Oral daily  tacrolimus 3 milliGRAM(s) Oral every 12 hours        PHYSICAL EXAM:  VITALS: T(C): 36.2 (04-01-19 @ 13:31)  T(F): 97.1 (04-01-19 @ 13:31), Max: 98.3 (03-31-19 @ 21:41)  HR: 77 (04-01-19 @ 13:31) (66 - 77)  BP: 126/78 (04-01-19 @ 13:31) (126/78 - 155/88)  RR:  (18 - 18)  SpO2:  (96% - 99%)  Wt(kg): --  GENERAL: Male sitting in bed in NAD  Abdomen: Soft, nontender, non distended, Central adiposity  Extremities: Warm, no edema in all 4 exts  NEURO: A&O X3    LABS:  POCT Blood Glucose.: 247 mg/dL (04-01-19 @ 12:17)  POCT Blood Glucose.: 221 mg/dL (04-01-19 @ 08:19)  POCT Blood Glucose.: 311 mg/dL (03-31-19 @ 21:40)  POCT Blood Glucose.: 280 mg/dL (03-31-19 @ 17:56)  POCT Blood Glucose.: 260 mg/dL (03-31-19 @ 13:02)  POCT Blood Glucose.: 178 mg/dL (03-31-19 @ 09:18)  POCT Blood Glucose.: 365 mg/dL (03-30-19 @ 21:27)  POCT Blood Glucose.: 290 mg/dL (03-30-19 @ 18:07)  POCT Blood Glucose.: 275 mg/dL (03-30-19 @ 12:46)  POCT Blood Glucose.: 193 mg/dL (03-30-19 @ 09:19)  POCT Blood Glucose.: 317 mg/dL (03-29-19 @ 22:43)  POCT Blood Glucose.: 267 mg/dL (03-29-19 @ 17:41)  POCT Blood Glucose.: 204 mg/dL (03-29-19 @ 14:18)                            11.1   3.88  )-----------( 210      ( 30 Mar 2019 11:02 )             37.5       03-30    142  |  107  |  21  ----------------------------<  208<H>  4.9   |  24  |  1.99<H>    EGFR if : 40<L>  EGFR if non : 34<L>    Ca    9.3      03-30    Hemoglobin A1C, Whole Blood: 9.2 % <H> [4.0 - 5.6] (03-09-19 @ 10:23)

## 2019-04-01 NOTE — PROGRESS NOTE ADULT - PROBLEM SELECTOR PLAN 2
Patient was on tacrolimus 3 mg BID,  BID and prednisone 5 mg daily  as outpatient. Continue with same dose of immunosuppressant medications. Monitor daily FK levels( check 30 minutes prior to AM dose).

## 2019-04-01 NOTE — DISCHARGE NOTE NURSING/CASE MANAGEMENT/SOCIAL WORK - NSSCTYPOFSERV_GEN_ALL_CORE
Skilled nursing visits to reinforce diabetes education/teaching-glucometer and insulin administration

## 2019-04-01 NOTE — DISCHARGE NOTE PROVIDER - CARE PROVIDER_API CALL
Jeromy Perla)  Internal Medicine  [address] [place] [address]  Phone: [phone]  Fax: [phone]  Follow Up Time: 1 week    renal transplant team,   Phone: (   )    -  Fax: (   )    -  Follow Up Time: 1 week    endocrinologist,   Phone: (   )    -  Fax: (   )    -  Follow Up Time: 1 week

## 2019-04-01 NOTE — DISCHARGE NOTE NURSING/CASE MANAGEMENT/SOCIAL WORK - NSDCDPATPORTLINK_GEN_ALL_CORE
You can access the [facility] Patient Portal, offered by [facility], by registering with the following website: [email or web address]

## 2019-04-01 NOTE — PROGRESS NOTE ADULT - SUBJECTIVE AND OBJECTIVE BOX
Subjective: Patient seen and examined. No new events except as noted.     SUBJECTIVE/ROS:  No chest pain, dyspnea, palpitation, or dizziness.     MEDICATIONS:  MEDICATIONS  (STANDING):  aspirin enteric coated 81 milliGRAM(s) Oral daily  atorvastatin 80 milliGRAM(s) Oral at bedtime  calcium carbonate   1250 mG (OsCal) 1 Tablet(s) Oral daily  dextrose 5%. 1000 milliLiter(s) (50 mL/Hr) IV Continuous <Continuous>  dextrose 50% Injectable 12.5 Gram(s) IV Push once  dextrose 50% Injectable 25 Gram(s) IV Push once  dextrose 50% Injectable 25 Gram(s) IV Push once  heparin  Injectable 5000 Unit(s) SubCutaneous every 8 hours  insulin glargine Injectable (LANTUS) 5 Unit(s) SubCutaneous at bedtime  insulin lispro (HumaLOG) corrective regimen sliding scale   SubCutaneous three times a day before meals  insulin lispro (HumaLOG) corrective regimen sliding scale   SubCutaneous at bedtime  insulin lispro Injectable (HumaLOG) 2 Unit(s) SubCutaneous three times a day before meals  labetalol 300 milliGRAM(s) Oral three times a day  mycophenolate mofetil 250 milliGRAM(s) Oral two times a day  NIFEdipine XL 60 milliGRAM(s) Oral daily  predniSONE   Tablet 5 milliGRAM(s) Oral daily  tacrolimus 3 milliGRAM(s) Oral every 12 hours  tamsulosin 0.4 milliGRAM(s) Oral at bedtime      PHYSICAL EXAM:  T(C): 36.6 (04-01-19 @ 05:45), Max: 36.8 (03-31-19 @ 21:41)  HR: 68 (04-01-19 @ 05:45) (66 - 74)  BP: 151/89 (04-01-19 @ 05:45) (148/83 - 159/87)  RR: 18 (04-01-19 @ 05:45) (18 - 18)  SpO2: 99% (04-01-19 @ 05:45) (96% - 99%)  Wt(kg): --  I&O's Summary    31 Mar 2019 07:01  -  01 Apr 2019 07:00  --------------------------------------------------------  IN: 980 mL / OUT: 0 mL / NET: 980 mL            JVP: Normal  Neck: supple  Lung: clear   CV: S1 S2 , Murmur:  Abd: soft  Ext: No edema  neuro: Awake / alert  Psych: flat affect  Skin: normal``    LABS/DATA:    CARDIAC MARKERS:                                11.1   3.88  )-----------( 210      ( 30 Mar 2019 11:02 )             37.5     03-30    142  |  107  |  21  ----------------------------<  208<H>  4.9   |  24  |  1.99<H>    Ca    9.3      30 Mar 2019 08:37      proBNP:   Lipid Profile:   HgA1c:   TSH:     TELE:  EKG:

## 2019-04-01 NOTE — PROGRESS NOTE ADULT - ATTENDING COMMENTS
Advanced care planning was discussed with patient and family.  Risks, benefits and alternatives of the cardiac treatments and medical therapy including procedures were discussed in detail and all questions were answered. 30 minutes face to face spent.
Kidney transplant Recipient with functioning renal allograft  Reviewed immunosuppression and allograft function  Plan: Continue diabetes care and glycemic control regimen  I was present during and reviewed clinical and lab data as well as assessment and plan as documented by the house staff as noted. Please contact if any additional questions with any change in clinical condition or on availability of any additional information or reports.

## 2019-04-01 NOTE — DISCHARGE NOTE PROVIDER - HOSPITAL COURSE
63 year old male with history of DM Type II, diabetic retinopathy, s/p kidney transplant in 2011, recent admission and discharge from [facility] for high grade coag neg staph bacteremia and pyelonephritis (s/p 2 week course of vancomycin, recently completed, PICC removed 2 days ago), presenting from home with seizure in the setting of hypoglycemia.    Endocrine consulted. Medications were adjusted. Prodigi glucometer prescribed    cardiology consulted.    Neurology consulted for seizure but in setting of hypoglycemia.     Discharge patient home to follow up with endocrinmoloy

## 2019-04-01 NOTE — PROGRESS NOTE ADULT - ASSESSMENT
64 y/o M w/h/o uncontrolled T2DM  c/b retinopathy>legally blind/nephropathy >s/p renal tx in 20111. Pt had recent  pyelonephritis with bacteremia requiring hospitalization. Pt improved diet. However,  night prior to admission he went to restaurant and forgot to bring Novolog insulin pen with him. Pt had some salad with bread sticks and went he returned home he checked BG to be in 200s and took 10 units of Novolog. However, he also had the Levemir insulin pen with him and can't recall if he took Levemir insulin with the Novolog or got confused and took Novolog twice. Pt states he had the 2 pens in his hands at the same time and might had gotten confused. Pt was found by wife unresponsive and with seizure like movements who called EMS. Pt found to have hypoglycemia and was admitted to hospital. Pt reports feeling much better and ready to go home today. BGs are now above goal (>200s) since pt is getting less than 50% of total insulin doses he takes at home. No further hypoglycemia noted. Primary team ready to discharge pt today. 64 y/o M w/h/o uncontrolled T2DM  c/b retinopathy>legally blind/nephropathy >s/p renal tx in 20111. Pt recently had  pyelonephritis with bacteremia requiring hospitalization. Pt improved diet with BG improved. However, he is now readmitted with seizures and hypoglycemia likely due to a medication error. Pt is legally blind and was holding the Novolog and Levemir insulin pens at the same time and doesn't know if he took the wrong pen. Also when talking to pt, he stated that he had dinner at around 5pm at a restaurant and forgot to take his Novolog pen with him. He went home and took Novolog at around 8pm and then proceeded to take Novolog at that time. Pt's wife brought  some papers with PO intake plus insulin doses plus BG readings that pt has been tracking at home. Noted BG mostly at goal while on Levemir 10 units q hs plus Humalog 10 units ac meals. Pt takes less Novolog at times when BG are low 100s or not eating much. No hypoglycemic BG levels noted in his home record.     Met with patient/wife and reviewed the following:  -A1c LEVEL: Present and goal  -Blood glucose goals: 100s to 180s as out pt  -Glucose monitoring frequency: ac and hs. Needs Prodigy meter but insurance doesn't cover meter. Sent preauthorization form to insurance.  -Hypoglycemia prevention, detection and treatment. Needs glucagon emergency kit at home  -Healthy eating and portion control. Noted per home records that pt is eating healthy food and portions   -Insulin(s) action, time of administration and side effects. Review basal/bolus insulins focusing on time of action and safety use. Instructed pt/wife to keep Levemir and Novolog pens separately at home. Pt to administer premeal Novolog right before eating or right after eating. Pt advised not to take meal time insulin if he is not eating as he did the day of hypoglycemic event. Pt ate at 5pm and took meal time dose at 8pm increasing risk for hypoglycemia.   -Importance of follow up care. Pt follows  at [place] with [facility].   Spoke to wife to make sure pt is dialing correct insulin and correct insulin dose at home even if pt is counting clicks.   Pt and wife able to verbalized understanding and give teach back. Reinforced proper use of insulin pen.  Spent over 50 minutes providing direct diabetes education as noted above plus discharge plan. High complexity encounter.

## 2019-04-01 NOTE — PROGRESS NOTE ADULT - PROBLEM SELECTOR PLAN 1
Pt. with DDRT in 2011. Pt. reports SCr fluctuating between 1.7 - 1.9 as outpatient. Latest SCr 1.99 on 3/30/19. Monitor BMP daily.
DISCHARGE:  -Test BG ac and hs  -C/w Levemir 10 units qhs  -Novolog 8 units before meals or right after meals.  -Pt to f/u with [facility]. Wife states will make apt upon discharge.  -Team sent Rx for Prodigy voice mate glucose meter with lancets and strips to [facility]. Awaiting insurance preauthorization. Pt/wife considering to purchase meter since it will take time for insurance to authorize this meter.   -Needs home care to teach use of voice mate meter and reinforce above education. Per team service already set up for education.    -Plan discussed with pt/wife/RN and team.  Contact info: [phone] (24/7). pager [phone]

## 2019-04-01 NOTE — PROGRESS NOTE ADULT - SUBJECTIVE AND OBJECTIVE BOX
[facility] DIVISION OF KIDNEY DISEASES AND HYPERTENSION -- FOLLOW UP NOTE  --------------------------------------------------------------------------------  Chief Complaint: DDRT in 2011    24 hour events/subjective: Pt. was seen and examined with family member at bedside, reports feeling well, no complains.     PAST HISTORY  --------------------------------------------------------------------------------  No significant changes to PMH, PSH, FHx, SHx, unless otherwise noted    ALLERGIES & MEDICATIONS  --------------------------------------------------------------------------------  Allergies    No Known Drug Allergies  Seafood (Pruritus; Rash)    Intolerances      Standing Inpatient Medications  aspirin enteric coated 81 milliGRAM(s) Oral daily  atorvastatin 80 milliGRAM(s) Oral at bedtime  calcium carbonate   1250 mG (OsCal) 1 Tablet(s) Oral daily  dextrose 5%. 1000 milliLiter(s) IV Continuous <Continuous>  dextrose 50% Injectable 12.5 Gram(s) IV Push once  dextrose 50% Injectable 25 Gram(s) IV Push once  dextrose 50% Injectable 25 Gram(s) IV Push once  heparin  Injectable 5000 Unit(s) SubCutaneous every 8 hours  insulin glargine Injectable (LANTUS) 5 Unit(s) SubCutaneous at bedtime  insulin lispro (HumaLOG) corrective regimen sliding scale   SubCutaneous three times a day before meals  insulin lispro (HumaLOG) corrective regimen sliding scale   SubCutaneous at bedtime  insulin lispro Injectable (HumaLOG) 2 Unit(s) SubCutaneous three times a day before meals  labetalol 300 milliGRAM(s) Oral three times a day  mycophenolate mofetil 250 milliGRAM(s) Oral two times a day  NIFEdipine XL 60 milliGRAM(s) Oral daily  predniSONE   Tablet 5 milliGRAM(s) Oral daily  tacrolimus 3 milliGRAM(s) Oral every 12 hours  tamsulosin 0.4 milliGRAM(s) Oral at bedtime    REVIEW OF SYSTEMS  --------------------------------------------------------------------------------  Gen: No fevers/chills  Skin: No rashes  Head/Eyes/Ears: + legally blind   Respiratory: No dyspnea, cough  CV: No chest pain  GI: No abdominal pain, diarrhea  : No dysuria, hematuria  MSK: No  edema  Heme: No easy bruising or bleeding  Psych: No significant depression    All other systems were reviewed and are negative, except as noted.    VITALS/PHYSICAL EXAM  --------------------------------------------------------------------------------  T(C): 36.6 (04-01-19 @ 11:00), Max: 36.8 (03-31-19 @ 21:41)  HR: 68 (04-01-19 @ 11:00) (66 - 74)  BP: 148/77 (04-01-19 @ 11:00) (148/77 - 155/88)  RR: 18 (04-01-19 @ 11:00) (18 - 18)  SpO2: 99% (04-01-19 @ 11:00) (96% - 99%)  Wt(kg): --        03-31-19 @ 07:01  -  04-01-19 @ 07:00  --------------------------------------------------------  IN: 980 mL / OUT: 0 mL / NET: 980 mL    Physical Exam:  	Gen: sitting, obese  	HEENT: MMM  	Pulm: CTA B/L  	CV: S1S2  	Abd: Soft, +BS, obese  	Ext: No LE edema B/L  	Neuro: Awake  	Skin: Warm and dry  	Vascular access: RUE AVF + no thrill no bruit      LABS/STUDIES  --------------------------------------------------------------------------------                Creatinine Trend:  SCr 1.99 [03-30 @ 08:37]  SCr 1.82 [03-29 @ 02:39]  SCr 2.09 [03-18 @ 07:54]  SCr 1.86 [03-17 @ 05:52]  SCr 1.98 [03-16 @ 07:04]    Urinalysis - [03-29-19 @ 12:16]      Color Yellow / Appearance Clear / SG 1.029 / pH 5.5      Gluc Negative / Ketone Small  / Bili Negative / Urobili Negative       Blood Negative / Protein 30 mg/dL / Leuk Est Negative / Nitrite Negative      RBC 2 / WBC 3 / Hyaline  / Gran  / Sq Epi  / Non Sq Epi 2 / Bacteria Negative      HbA1c 9.2      [03-09-19 @ 10:23]    HCV 0.06, Nonreact      [03-09-19 @ 14:44]

## 2019-04-03 LAB
CULTURE RESULTS: SIGNIFICANT CHANGE UP
CULTURE RESULTS: SIGNIFICANT CHANGE UP
SPECIMEN SOURCE: SIGNIFICANT CHANGE UP
SPECIMEN SOURCE: SIGNIFICANT CHANGE UP

## 2019-04-05 DIAGNOSIS — N18.9 CHRONIC KIDNEY DISEASE, UNSPECIFIED: ICD-10-CM

## 2019-04-05 DIAGNOSIS — R78.81 BACTEREMIA: ICD-10-CM

## 2019-04-05 DIAGNOSIS — Z45.2 ENCOUNTER FOR ADJUSTMENT AND MANAGEMENT OF VASCULAR ACCESS DEVICE: ICD-10-CM

## 2019-05-03 LAB
ALBUMIN SERPL ELPH-MCNC: 4.1 G/DL
ALP BLD-CCNC: 59 U/L
ALT SERPL-CCNC: 37 U/L
ANION GAP SERPL CALC-SCNC: 14 MMOL/L
AST SERPL-CCNC: 21 U/L
BASOPHILS # BLD AUTO: 0.02 K/UL
BASOPHILS NFR BLD AUTO: 0.4 %
BILIRUB SERPL-MCNC: 0.3 MG/DL
BUN SERPL-MCNC: 27 MG/DL
CALCIUM SERPL-MCNC: 9 MG/DL
CHLORIDE SERPL-SCNC: 105 MMOL/L
CO2 SERPL-SCNC: 21 MMOL/L
CREAT SERPL-MCNC: 2.34 MG/DL
EOSINOPHIL # BLD AUTO: 0.16 K/UL
EOSINOPHIL NFR BLD AUTO: 3.4 %
GLUCOSE SERPL-MCNC: 171 MG/DL
HCT VFR BLD CALC: 39.1 %
HGB BLD-MCNC: 11.1 G/DL
IMM GRANULOCYTES NFR BLD AUTO: 0 %
INR PPP: 1.07 RATIO
LYMPHOCYTES # BLD AUTO: 0.76 K/UL
LYMPHOCYTES NFR BLD AUTO: 16 %
MAN DIFF?: NORMAL
MCHC RBC-ENTMCNC: 21.7 PG
MCHC RBC-ENTMCNC: 28.4 GM/DL
MCV RBC AUTO: 76.4 FL
MONOCYTES # BLD AUTO: 0.7 K/UL
MONOCYTES NFR BLD AUTO: 14.7 %
NEUTROPHILS # BLD AUTO: 3.12 K/UL
NEUTROPHILS NFR BLD AUTO: 65.5 %
PLATELET # BLD AUTO: 202 K/UL
POTASSIUM SERPL-SCNC: 4.3 MMOL/L
PROT SERPL-MCNC: 6.3 G/DL
PT BLD: 12.1 SEC
RBC # BLD: 5.12 M/UL
RBC # FLD: 16.5 %
SODIUM SERPL-SCNC: 140 MMOL/L
WBC # FLD AUTO: 4.76 K/UL

## 2019-05-19 ENCOUNTER — EMERGENCY (EMERGENCY)
Facility: HOSPITAL | Age: 64
LOS: 1 days | Discharge: HOME | End: 2019-05-19
Attending: EMERGENCY MEDICINE | Admitting: EMERGENCY MEDICINE
Payer: COMMERCIAL

## 2019-05-19 VITALS
SYSTOLIC BLOOD PRESSURE: 170 MMHG | OXYGEN SATURATION: 98 % | HEART RATE: 60 BPM | DIASTOLIC BLOOD PRESSURE: 68 MMHG | TEMPERATURE: 97 F | RESPIRATION RATE: 14 BRPM

## 2019-05-19 VITALS
SYSTOLIC BLOOD PRESSURE: 139 MMHG | DIASTOLIC BLOOD PRESSURE: 85 MMHG | TEMPERATURE: 98 F | RESPIRATION RATE: 98 BRPM | HEART RATE: 91 BPM

## 2019-05-19 DIAGNOSIS — Z98.89 OTHER SPECIFIED POSTPROCEDURAL STATES: Chronic | ICD-10-CM

## 2019-05-19 DIAGNOSIS — Z94.0 KIDNEY TRANSPLANT STATUS: Chronic | ICD-10-CM

## 2019-05-19 LAB
ALBUMIN SERPL ELPH-MCNC: 4.1 G/DL — SIGNIFICANT CHANGE UP (ref 3.3–5)
ALP SERPL-CCNC: 57 U/L — SIGNIFICANT CHANGE UP (ref 40–120)
ALT FLD-CCNC: 11 U/L — SIGNIFICANT CHANGE UP (ref 4–41)
ANION GAP SERPL CALC-SCNC: 13 MMO/L — SIGNIFICANT CHANGE UP (ref 7–14)
AST SERPL-CCNC: 14 U/L — SIGNIFICANT CHANGE UP (ref 4–40)
BASOPHILS # BLD AUTO: 0.02 K/UL — SIGNIFICANT CHANGE UP (ref 0–0.2)
BASOPHILS NFR BLD AUTO: 0.4 % — SIGNIFICANT CHANGE UP (ref 0–2)
BILIRUB SERPL-MCNC: 0.2 MG/DL — SIGNIFICANT CHANGE UP (ref 0.2–1.2)
BUN SERPL-MCNC: 19 MG/DL — SIGNIFICANT CHANGE UP (ref 7–23)
CALCIUM SERPL-MCNC: 9 MG/DL — SIGNIFICANT CHANGE UP (ref 8.4–10.5)
CHLORIDE SERPL-SCNC: 108 MMOL/L — HIGH (ref 98–107)
CO2 SERPL-SCNC: 22 MMOL/L — SIGNIFICANT CHANGE UP (ref 22–31)
CREAT SERPL-MCNC: 1.81 MG/DL — HIGH (ref 0.5–1.3)
EOSINOPHIL # BLD AUTO: 0.1 K/UL — SIGNIFICANT CHANGE UP (ref 0–0.5)
EOSINOPHIL NFR BLD AUTO: 2 % — SIGNIFICANT CHANGE UP (ref 0–6)
GLUCOSE SERPL-MCNC: 126 MG/DL — HIGH (ref 70–99)
HCT VFR BLD CALC: 41.1 % — SIGNIFICANT CHANGE UP (ref 39–50)
HGB BLD-MCNC: 11.7 G/DL — LOW (ref 13–17)
IMM GRANULOCYTES NFR BLD AUTO: 0.2 % — SIGNIFICANT CHANGE UP (ref 0–1.5)
LYMPHOCYTES # BLD AUTO: 0.67 K/UL — LOW (ref 1–3.3)
LYMPHOCYTES # BLD AUTO: 13.6 % — SIGNIFICANT CHANGE UP (ref 13–44)
MCHC RBC-ENTMCNC: 21.5 PG — LOW (ref 27–34)
MCHC RBC-ENTMCNC: 28.5 % — LOW (ref 32–36)
MCV RBC AUTO: 75.4 FL — LOW (ref 80–100)
MONOCYTES # BLD AUTO: 0.64 K/UL — SIGNIFICANT CHANGE UP (ref 0–0.9)
MONOCYTES NFR BLD AUTO: 13 % — SIGNIFICANT CHANGE UP (ref 2–14)
NEUTROPHILS # BLD AUTO: 3.49 K/UL — SIGNIFICANT CHANGE UP (ref 1.8–7.4)
NEUTROPHILS NFR BLD AUTO: 70.8 % — SIGNIFICANT CHANGE UP (ref 43–77)
NRBC # FLD: 0 K/UL — SIGNIFICANT CHANGE UP (ref 0–0)
PLATELET # BLD AUTO: 193 K/UL — SIGNIFICANT CHANGE UP (ref 150–400)
PMV BLD: 10.1 FL — SIGNIFICANT CHANGE UP (ref 7–13)
POTASSIUM SERPL-MCNC: 3.8 MMOL/L — SIGNIFICANT CHANGE UP (ref 3.5–5.3)
POTASSIUM SERPL-SCNC: 3.8 MMOL/L — SIGNIFICANT CHANGE UP (ref 3.5–5.3)
PROT SERPL-MCNC: 6.5 G/DL — SIGNIFICANT CHANGE UP (ref 6–8.3)
RBC # BLD: 5.45 M/UL — SIGNIFICANT CHANGE UP (ref 4.2–5.8)
RBC # FLD: 16.6 % — HIGH (ref 10.3–14.5)
SODIUM SERPL-SCNC: 143 MMOL/L — SIGNIFICANT CHANGE UP (ref 135–145)
WBC # BLD: 4.93 K/UL — SIGNIFICANT CHANGE UP (ref 3.8–10.5)
WBC # FLD AUTO: 4.93 K/UL — SIGNIFICANT CHANGE UP (ref 3.8–10.5)

## 2019-05-19 PROCEDURE — 99283 EMERGENCY DEPT VISIT LOW MDM: CPT | Mod: 25

## 2019-05-19 NOTE — ED ADULT NURSE NOTE - OBJECTIVE STATEMENT
pt a&ox4, ambulatory at baseline, arrives to ED room 24 s/o hypoglycemia. Pt reports taking Novolog around dinner time. Pt reports falling asleep in bed. Wife states noticed patient was diaphoretic and altered, FS 22. Pt's wife fed patient juice and called EMS. Pt arrives with 20G to L hand. Pt denies any s/s at this time. VSS. Safety and comfort maintained. Hx of kidney transplant R sided, HTN, and DM.

## 2019-05-19 NOTE — ED ADULT TRIAGE NOTE - CHIEF COMPLAINT QUOTE
patient arrives from home for hypoglycemia/lethargy. FS read as "LOW" on ems arrival 2350. Given 1.5 amp d50 has #20g lt hand in place. Currently A&Ox4 at baseline. Patient also legally blind at baseline.  in triage, patient provided with crackers, & juice. Denies any dizziness weakness, CP, sob. patient arrives from home for hypoglycemia/lethargy. FS read as "LOW" on ems arrival 2350. Given 1.5 amp d50 has #20g lt hand in place. Currently A&Ox4 at baseline. Patient also legally blind at baseline.  in triage, patient provided with crackers, & juice. Denies any dizziness weakness, CP, sob. As per patient and wife only took 10 units novolog today at 1800 before dinner.

## 2019-05-19 NOTE — ED ADULT NURSE NOTE - NSIMPLEMENTINTERV_GEN_ALL_ED
Implemented All Fall Risk Interventions:  [place] to call system. Call bell, personal items and telephone within reach. Instruct patient to call for assistance. Room bathroom lighting operational. Non-slip footwear when patient is off stretcher. Physically safe environment: no spills, clutter or unnecessary equipment. Stretcher in lowest position, wheels locked, appropriate side rails in place. Provide visual cue, wrist band, yellow gown, etc. Monitor gait and stability. Monitor for mental status changes and reorient to person, place, and time. Review medications for side effects contributing to fall risk. Reinforce activity limits and safety measures with patient and family.

## 2019-05-19 NOTE — ED ADULT NURSE NOTE - NSSUSCREENINGQ1_ED_ALL_ED
Patient: Teresa Rodríguez    Procedure(s):  LAPAROSCOPIC APPENDECTOMY, LAPAROSCOPIC CYSTECTOMY - Wound Class: II-Clean Contaminated   - Wound Class: II-Clean Contaminated    Diagnosis: ACUTE APPENDICITIS  Diagnosis Additional Information: No value filed.    Anesthesia Type:   General, ETT     Note:  Airway :Nasal Cannula  Patient transferred to:PACU        Vitals: (Last set prior to Anesthesia Care Transfer)    CRNA VITALS  7/31/2017 1058 - 7/31/2017 1139      7/31/2017             NIBP: 115/59    Pulse: 96    NIBP Mean: 82    Ht Rate: 96                Electronically Signed By: QING Perry CRNA  July 31, 2017  11:39 AM   No

## 2019-05-19 NOTE — ED PROVIDER NOTE - ATTENDING CONTRIBUTION TO CARE
64 y/o M, legally blind with h/o renal transplant, DM on insulin here with hypoglycemia.  Pt reports having a small dinner tonight, took his 10units novolog at the time.  He went upstairs to rest prior to watching the basketball game tonight and subsequently fell asleep.  His wife found him asleep and appearing diaphoretic, so she woke him and checked his sugar - fingerstick 20 at the time.  Pt drank some juice, but fingerstick cont to be low upon EMS arrival, so received 1.5 amp D50.  Pt states he fell asleep prior to taking his nighttime dose of levemir - last hypoglycemic was novolog around 6pm.  Pt is now asymptomatic, no complaints.  Denies recent illness, fever, chills, pain, change in appetite, n/v/d.  No new medications or dosing changes.  Well appearing, lying comfortably in stretcher, awake and alert, nontoxic.  VSS.  Lungs cta bl.  Cards nl S1/S2, RRR, no MRG.  Abd soft ntnd.  No pedal edema or calf tenderness.  No focal neuro deficits.  Plan to check renal function, recheck fs - if stable and no indication of worsening renal function, likely dc home

## 2019-05-19 NOTE — ED ADULT NURSE NOTE - CHIEF COMPLAINT QUOTE
patient arrives from home for hypoglycemia/lethargy. FS read as "LOW" on ems arrival 2350. Given 1.5 amp d50 has #20g lt hand in place. Currently A&Ox4 at baseline. Patient also legally blind at baseline.  in triage, patient provided with crackers, & juice. Denies any dizziness weakness, CP, sob. As per patient and wife only took 10 units novolog today at 1800 before dinner.

## 2019-05-19 NOTE — ED PROVIDER NOTE - CLINICAL SUMMARY MEDICAL DECISION MAKING FREE TEXT BOX
Pt is a 64 y/o legally blind M w/ a PMHx of DM and s/p renal transplant (baseline Cr 1.7-1.9) who p/w hypoglycemia liekly due to insulin over use with low PO intake and no infectious source. Will check CBC and CMP for Cr to r/o BENEDICTO on CKD. Monitor FS and if stable will d/c home.

## 2019-05-19 NOTE — ED PROVIDER NOTE - NSFOLLOWUPINSTRUCTIONS_ED_ALL_ED_FT
You were seen at [facility] ER for hypoglycemia. You had lab work and we monitored your finger sticks which were stable. Your lab work showed you were at your baseline Creatinine (1.8).     You are to continue to monitor for hypoglycemia. if you are not going to eat a full meal take half of your Novolog ( 5 units instead of 10 units).     You are also to follow-up with your primary care doctor or endocrinologist in the next week for further management of your diabetes.     Please return for any further hypoglycemic episodes or any other concerning symptoms.

## 2019-05-19 NOTE — ED PROVIDER NOTE - PROGRESS NOTE DETAILS
patient finger stick stable and Cr at baseline. Will discharge with PMD follow-up.   -Aime Michael PGY4 EMIM [facility]#[id number]

## 2019-05-19 NOTE — ED PROVIDER NOTE - OBJECTIVE STATEMENT
Pt is a 64 y/o legally blind M w/ a PMHx of DM and s/p renal transplant (baseline Cr 1.7-1.9) who p/w hypoglycemia. At 6pm took his FS and it was 160 ate a small amount of tuba and a few keibler crackers and took his 10U of Novolog. Then he went upstairs because her felt weird. Was awoken by wife because he was sweating and she check FS and it was 22. Gave him Juice and then EMS came and checked his FS and was low. given 1.5amp of D50 and came to hospital.    Patient denies cough, nasal congestion, abdominal pain, nausea, vomiting, recent antibiotic use, diarrhea, dysuria, urinary frequency, new rashes or injuries, headaches, neck stiffness, photophobia, and sick contacts. Pt is a 64 y/o legally blind M w/ a PMHx of DM and s/p renal transplant (baseline Cr 1.7-1.9) who p/w hypoglycemia. At 6pm took his FS and it was 160 ate a small amount of tuna and a few keibler crackers and took his 10U of Novolog. Then he went upstairs because her felt weird. Was awoken by wife because he was sweating and she check FS and it was 22. Gave him Juice and then EMS came and checked his FS and was low. given 1.5amp of D50 and came to hospital.    Patient denies cough, nasal congestion, abdominal pain, nausea, vomiting, recent antibiotic use, diarrhea, dysuria, urinary frequency, new rashes or injuries, headaches, neck stiffness, photophobia, and sick contacts.

## 2019-06-26 ENCOUNTER — APPOINTMENT (OUTPATIENT)
Dept: NEPHROLOGY | Facility: CLINIC | Age: 64
End: 2019-06-26
Payer: MEDICARE

## 2019-06-26 VITALS
WEIGHT: 263.45 LBS | HEART RATE: 53 BPM | HEIGHT: 74 IN | DIASTOLIC BLOOD PRESSURE: 86 MMHG | BODY MASS INDEX: 33.81 KG/M2 | OXYGEN SATURATION: 98 % | SYSTOLIC BLOOD PRESSURE: 136 MMHG

## 2019-06-26 DIAGNOSIS — I10 ESSENTIAL (PRIMARY) HYPERTENSION: ICD-10-CM

## 2019-06-26 DIAGNOSIS — Z00.00 ENCOUNTER FOR GENERAL ADULT MEDICAL EXAMINATION W/OUT ABNORMAL FINDINGS: ICD-10-CM

## 2019-06-26 DIAGNOSIS — Z94.0 KIDNEY TRANSPLANT STATUS: ICD-10-CM

## 2019-06-26 PROCEDURE — 99214 OFFICE O/P EST MOD 30 MIN: CPT

## 2019-06-26 RX ORDER — METOPROLOL TARTRATE 100 MG/1
100 TABLET, FILM COATED ORAL
Qty: 60 | Refills: 5 | Status: DISCONTINUED | COMMUNITY
Start: 2016-07-22 | End: 2019-06-26

## 2019-06-26 RX ORDER — LABETALOL HYDROCHLORIDE 300 MG/1
300 TABLET, FILM COATED ORAL
Qty: 60 | Refills: 0 | Status: ACTIVE | COMMUNITY
Start: 2019-06-26

## 2019-06-26 RX ORDER — FUROSEMIDE 20 MG/1
20 TABLET ORAL TWICE DAILY
Qty: 60 | Refills: 0 | Status: DISCONTINUED | COMMUNITY
Start: 2018-01-03 | End: 2019-06-26

## 2019-06-26 RX ORDER — LOSARTAN POTASSIUM 50 MG/1
50 TABLET, FILM COATED ORAL
Qty: 90 | Refills: 3 | Status: DISCONTINUED | COMMUNITY
Start: 1900-01-01 | End: 2019-06-26

## 2019-06-26 NOTE — ASSESSMENT
[FreeTextEntry1] : 63 AAM with long standing hx of HTN, DM, obesity s/p renal transplant here for f.u\par \par Renal transplant\par check tacro, UA, prot/cr, cbc, renal panel  in 2 weeks as changing losartan dose and also took his tacro this AM.\par \par HTN: not controlled. Keep CCB same dose, now on labetelol and off losartan. BP at goal for now\par Check A1c as well\par \par Vascular:- new left vessel in arm felt- calcification like- asked him to see Dr Hurley or Walker regarding this. His AVF arm appears well and no major changes. \par \par PMD: [phone]. Dr Perla. \par Endo: Dr Thurman. [phone]\par \par

## 2019-06-26 NOTE — PHYSICAL EXAM
[General Appearance - Alert] : alert [General Appearance - In No Acute Distress] : in no acute distress [Outer Ear] : the ears and nose were normal in appearance [Oropharynx] : the oropharynx was normal [Neck Appearance] : the appearance of the neck was normal [Neck Cervical Mass (___cm)] : no neck mass was observed [Jugular Venous Distention Increased] : there was no jugular-venous distention [Thyroid Diffuse Enlargement] : the thyroid was not enlarged [Thyroid Nodule] : there were no palpable thyroid nodules [Auscultation Breath Sounds / Voice Sounds] : lungs were clear to auscultation bilaterally [Heart Rate And Rhythm] : heart rate was normal and rhythm regular [Heart Sounds] : normal S1 and S2 [Heart Sounds Gallop] : no gallops [Murmurs] : no murmurs [Heart Sounds Pericardial Friction Rub] : no pericardial rub [Edema] : there was no peripheral edema [Bowel Sounds] : normal bowel sounds [Abdomen Soft] : soft [Abdomen Tenderness] : non-tender [] : no hepato-splenomegaly [Abdomen Mass (___ Cm)] : no abdominal mass palpated [Abnormal Walk] : normal gait [___ (cm) Fistula] : [unfilled] (cm) fistula [FreeTextEntry1] : r upper arm- old no thrill or bruit [Skin Color & Pigmentation] : normal skin color and pigmentation [Oriented To Time, Place, And Person] : oriented to person, place, and time

## 2019-06-26 NOTE — HISTORY OF PRESENT ILLNESS
[FreeTextEntry1] : 62 yo AAM with h/o kidney transplant at [facility] in 2011. Likely DDRT. Patient was accompanied by his wife. He Never had a kidney biopsy prior or after his transplant. Per wife his renal failure was presumably a complication of longstanding HTN and DM. \par Patient has had DM II since 1987. HTN since the 1980's. His DM was complicated by blindness. He also has long standing obesity. He was on HD since 2006 via a  right arm AVF  that was placed at the same time. His cr fluctuates between 1.6-18 dating back to 10/2015. Last cr in 9/2017 was 1.84. He has had no other surgeries apart from his AVF placement in 2006  and kidney transplant in 2011 and a right brachial artery pseudoaneurysm done in 4/2017.\par \par S/p his kidney transplant in 2006 his course was complicated by infection. He had 4 different admission at [facility] for an infection that was either from the kidney or prostate. After his transplant he had gotten 2 weeks of HD and then was weaned off. Since then he has not had to have anymore HD. \par He runs crt in 1.7 range and overall stable.  Since last visit, was admitted to [facility] and had BENEDICTO, resolved with hydration and antibiotics. He was taken off losartan as well. Since then, last crt was 1.8 in may 2019. Peaked at 2.5mgdl in March 2019.  All meds reviewed with patient and wife today

## 2019-06-27 LAB
ALBUMIN SERPL ELPH-MCNC: 4.3 G/DL
ANION GAP SERPL CALC-SCNC: 13 MMOL/L
BASOPHILS # BLD AUTO: 0.01 K/UL
BASOPHILS NFR BLD AUTO: 0.2 %
BUN SERPL-MCNC: 22 MG/DL
CALCIUM SERPL-MCNC: 9.3 MG/DL
CALCIUM SERPL-MCNC: 9.3 MG/DL
CHLORIDE SERPL-SCNC: 107 MMOL/L
CO2 SERPL-SCNC: 23 MMOL/L
CREAT SERPL-MCNC: 2.12 MG/DL
CREAT SPEC-SCNC: 386 MG/DL
CREAT/PROT UR: 0.1 RATIO
EOSINOPHIL # BLD AUTO: 0.09 K/UL
EOSINOPHIL NFR BLD AUTO: 2 %
ESTIMATED AVERAGE GLUCOSE: 169 MG/DL
GLUCOSE SERPL-MCNC: 177 MG/DL
HBA1C MFR BLD HPLC: 7.5 %
HCT VFR BLD CALC: 40 %
HGB BLD-MCNC: 11.6 G/DL
IMM GRANULOCYTES NFR BLD AUTO: 0.5 %
LYMPHOCYTES # BLD AUTO: 0.72 K/UL
LYMPHOCYTES NFR BLD AUTO: 16.3 %
MAN DIFF?: NORMAL
MCHC RBC-ENTMCNC: 22 PG
MCHC RBC-ENTMCNC: 29 GM/DL
MCV RBC AUTO: 75.8 FL
MONOCYTES # BLD AUTO: 0.53 K/UL
MONOCYTES NFR BLD AUTO: 12 %
NEUTROPHILS # BLD AUTO: 3.04 K/UL
NEUTROPHILS NFR BLD AUTO: 69 %
PARATHYROID HORMONE INTACT: 112 PG/ML
PHOSPHATE SERPL-MCNC: 3.4 MG/DL
PLATELET # BLD AUTO: 184 K/UL
POTASSIUM SERPL-SCNC: 4.4 MMOL/L
PROT UR-MCNC: 34 MG/DL
RBC # BLD: 5.28 M/UL
RBC # FLD: 16.1 %
SODIUM SERPL-SCNC: 143 MMOL/L
TACROLIMUS SERPL-MCNC: 4.7 NG/ML
WBC # FLD AUTO: 4.41 K/UL

## 2019-06-28 LAB
APPEARANCE: CLEAR
BACTERIA: NEGATIVE
BILIRUBIN URINE: NEGATIVE
BKV DNA SPEC QL NAA+PROBE: NOT DETECTED COPIES/ML
BLOOD URINE: NORMAL
CALCIUM OXALATE CRYSTALS: ABNORMAL
COLOR: YELLOW
GLUCOSE QUALITATIVE U: NEGATIVE
HYALINE CASTS: 2 /LPF
KETONES URINE: NORMAL
LEUKOCYTE ESTERASE URINE: NEGATIVE
MICROSCOPIC-UA: NORMAL
NITRITE URINE: NEGATIVE
PH URINE: 5.5
PROTEIN URINE: ABNORMAL
RED BLOOD CELLS URINE: 3 /HPF
SPECIFIC GRAVITY URINE: 1.03
SQUAMOUS EPITHELIAL CELLS: 1 /HPF
UROBILINOGEN URINE: NORMAL
WHITE BLOOD CELLS URINE: 3 /HPF

## 2019-07-22 PROBLEM — E11.9 TYPE 2 DIABETES MELLITUS WITHOUT COMPLICATIONS: Chronic | Status: ACTIVE | Noted: 2019-05-19

## 2019-07-22 PROBLEM — Z94.0 KIDNEY TRANSPLANT STATUS: Chronic | Status: ACTIVE | Noted: 2019-05-19

## 2019-08-19 ENCOUNTER — APPOINTMENT (OUTPATIENT)
Dept: VASCULAR SURGERY | Facility: CLINIC | Age: 64
End: 2019-08-19
Payer: MEDICARE

## 2019-08-19 VITALS
BODY MASS INDEX: 33.75 KG/M2 | TEMPERATURE: 97.1 F | WEIGHT: 263 LBS | DIASTOLIC BLOOD PRESSURE: 87 MMHG | HEART RATE: 61 BPM | SYSTOLIC BLOOD PRESSURE: 176 MMHG | HEIGHT: 74 IN

## 2019-08-19 PROCEDURE — 99214 OFFICE O/P EST MOD 30 MIN: CPT

## 2019-08-19 PROCEDURE — 93931 UPPER EXTREMITY STUDY: CPT

## 2019-08-19 PROCEDURE — 93971 EXTREMITY STUDY: CPT

## 2019-08-19 NOTE — ASSESSMENT
[FreeTextEntry1] : Status post repair of right brachial artery pseudoaneurysm. No evidence of stenosis. Widely patent vessel. Followup in one year for duplex.\par Patient with subacute thrombosis of the left forearm basilic vein. Continue aspirin. Followup in 2-3 weeks for repeat duplex.

## 2019-08-19 NOTE — HISTORY OF PRESENT ILLNESS
[FreeTextEntry1] : Patient with history of renal failure status post transplant with a right brachial artery pseudoaneurysm which was repaired about 6 months ago. No complaint of right upper extremity pain. No complaint of rest pain or claudication. Patient currently off dialysis with no difficulties.\par The patient was noted to have left forearm calcific vein with left hand swelling which has improved over the past 2 weeks. No complaint of chest pain or shortness of breath

## 2019-09-09 ENCOUNTER — RECORD ABSTRACTING (OUTPATIENT)
Age: 64
End: 2019-09-09

## 2019-09-09 ENCOUNTER — APPOINTMENT (OUTPATIENT)
Dept: VASCULAR SURGERY | Facility: CLINIC | Age: 64
End: 2019-09-09
Payer: MEDICARE

## 2019-09-09 DIAGNOSIS — I82.409 ACUTE EMBOLISM AND THROMBOSIS OF UNSPECIFIED DEEP VEINS OF UNSPECIFIED LOWER EXTREMITY: ICD-10-CM

## 2019-09-09 PROCEDURE — 93971 EXTREMITY STUDY: CPT

## 2019-09-09 PROCEDURE — 99213 OFFICE O/P EST LOW 20 MIN: CPT

## 2019-09-11 NOTE — PHYSICAL EXAM
[2+] : left 2+ [JVD] : no jugular venous distention  [Normal Breath Sounds] : Normal breath sounds [Normal Heart Sounds] : normal heart sounds [Ankle Swelling (On Exam)] : not present [Abdomen Masses] : No abdominal masses [Skin Ulcer] : no ulcer [Oriented to Person] : oriented to person [Oriented to Place] : oriented to place [de-identified] : appears well  [de-identified] : intact

## 2019-09-11 NOTE — HISTORY OF PRESENT ILLNESS
[FreeTextEntry1] : 65 yo male with history of esrd with thrombosed right avf s/p renal transplant presents for follow up after left upper extremity venous thrombus \par pt without any complaints at this time \par pt has been taking asa 81 mg qd \par denies any chest pain sob facial or upper extremity edema

## 2019-09-11 NOTE — ASSESSMENT
[FreeTextEntry1] : 63 yo male with history of esrd with thrombosed right avf s/p renal transplant presents for follow up after left upper extremity venous thrombus\par duplex shows acute thrombus in the proximal ijv closest to inomminate - chronic through the basilic in the forearm \par given the propigation of the thrombus will start anticoagulation \par discussed with dr johnston will start eliquis 2.5 mg bid \par pt to follow up in 2 weeks with repeat duplex

## 2019-09-23 ENCOUNTER — APPOINTMENT (OUTPATIENT)
Dept: VASCULAR SURGERY | Facility: CLINIC | Age: 64
End: 2019-09-23
Payer: MEDICARE

## 2019-09-23 PROCEDURE — 93971 EXTREMITY STUDY: CPT

## 2019-09-23 PROCEDURE — 99213 OFFICE O/P EST LOW 20 MIN: CPT

## 2019-09-23 NOTE — HISTORY OF PRESENT ILLNESS
[FreeTextEntry1] : 65 yo male with history of esrd with thrombosed right avf s/p renal transplant presents for follow up after left upper extremity venous thrombus \par pt without any complaints at this time \par pt has been taking eliquis\par denies any chest pain sob facial or upper extremity edema

## 2019-09-23 NOTE — ASSESSMENT
[FreeTextEntry1] : 65 yo male with history of esrd with thrombosed right avf s/p renal transplant presents for follow up after left upper extremity venous thrombus\par duplex shows acute thrombus in the proximal ijv closest to inomminate - chronic through the basilic in the forearm \par no extension noted\par pt to follow up in 2 weeks with repeat duplex

## 2019-09-23 NOTE — PHYSICAL EXAM
[JVD] : no jugular venous distention  [Normal Heart Sounds] : normal heart sounds [Normal Breath Sounds] : Normal breath sounds [2+] : left 2+ [Abdomen Masses] : No abdominal masses [Ankle Swelling (On Exam)] : not present [Skin Ulcer] : no ulcer [Oriented to Person] : oriented to person [Oriented to Place] : oriented to place [de-identified] : intact [de-identified] : appears well

## 2019-10-09 NOTE — PATIENT PROFILE ADULT. - PAIN CHRONIC, PROFILE
[facility]    CHIEF COMPLAINT:  Patient presents with:  Cough: x 1 month. Has been to walk in clinic x 2. Pt is 14 weeks preg. Sinus Problem        HISTORY OF PRESENT ILLNESS:  Complains of uri symptoms for 2 weeks. Has a bad cough.  Has congest Multistix Expiration Date 05/31/2019 Date   THINPREP PAP WITH HPV REFLEX REQUEST B   Result Value Ref Range    Thin Prep Pap AP TEST REFLEXED    THINPREP PAP WITH HPV REFLEX REQUEST   Result Value Ref Range    Interpretation/Result Negative for intraepithe Refill: 0    2. Upper respiratory tract infection, unspecified type  Will treat with augmentin. - Amoxicillin-Pot Clavulanate 875-125 MG Oral Tab; Take 1 tablet by mouth 2 (two) times daily. Dispense: 14 tablet;  Refill: 0    She is aware to contact her no

## 2019-11-11 ENCOUNTER — APPOINTMENT (OUTPATIENT)
Dept: VASCULAR SURGERY | Facility: CLINIC | Age: 64
End: 2019-11-11
Payer: MEDICARE

## 2019-11-11 PROCEDURE — 99213 OFFICE O/P EST LOW 20 MIN: CPT

## 2019-11-11 PROCEDURE — 93971 EXTREMITY STUDY: CPT

## 2019-11-11 NOTE — PHYSICAL EXAM
[JVD] : no jugular venous distention  [Normal Heart Sounds] : normal heart sounds [Normal Breath Sounds] : Normal breath sounds [2+] : left 2+ [Ankle Swelling (On Exam)] : not present [Abdomen Masses] : No abdominal masses [Skin Ulcer] : no ulcer [Oriented to Place] : oriented to place [Oriented to Person] : oriented to person [de-identified] : intact [de-identified] : appears well

## 2019-11-11 NOTE — ASSESSMENT
[FreeTextEntry1] : 63 yo male with history of esrd with thrombosed right avf s/p renal transplant presents for follow up after left upper extremity venous thrombus\par duplex shows Complete resolution of acute DVT. No intervention necessary.

## 2020-01-09 NOTE — ED PROVIDER NOTE - DISCHARGE DATE
BMT Pediatric Summary of Care    January 8, 2020 9:37 AM  Cony Middleton  MRN: [id number]    Discharge Date: 1/9/20    BMT Primary Physician: Dr. Sanna Gunn    BMT Nurse Coordinator: Avril Valdovinos RN    Discharge Diagnosis: S/P BMT    Discharge To: [facility] [place]    Activity: as tolerated.  Avoid areas with high pedestrian traffic.  Wear an N95 mask at all times when out in public    Catheter Care: [place]    Vascular Access Device Protocol Per Policy  Supplies through Home Infusion (Please supply central line dressing kits for weekly dressing changes).  [facility]  Fax: [phone]  Ph: [phone]       IV Medications through home infusion: none    Nutrition: TPN/IL-see separate orders for formula    Blood Transfusions:  Transfuse if Hemoglobin < or equal 9 mg/dL  Transfuse if Platelet count < or equal 50,000/uL  Transfusion Pre-meds:  None    Outpatient Pharmacy:  G-CSF to be given in clinic: (dose) 125 mcg IV daily prn for ANC<1000    Laboratory Tests:  At next clinic appointment (date: 1/10/2020 at 7:30 AM)  Hemogram (CBC) differential, platelet count  Basic Metabolic Panel  Magnesium  Phosphorus  Tacrolimus level    Support Services:  None    Appointments:   BMT Clinic (date, time, provider):   1/10/2020 at 8:00 AM  HAILEE Harmon M. D.   11:02 AM;1/9/2020       19-May-2019

## 2020-02-24 ENCOUNTER — APPOINTMENT (OUTPATIENT)
Dept: VASCULAR SURGERY | Facility: CLINIC | Age: 65
End: 2020-02-24
Payer: MEDICARE

## 2020-02-24 PROCEDURE — 99212 OFFICE O/P EST SF 10 MIN: CPT

## 2020-02-24 PROCEDURE — 93931 UPPER EXTREMITY STUDY: CPT

## 2020-02-24 RX ORDER — APIXABAN 2.5 MG/1
2.5 TABLET, FILM COATED ORAL
Qty: 180 | Refills: 1 | Status: DISCONTINUED | COMMUNITY
Start: 2019-09-09 | End: 2020-02-24

## 2020-02-24 RX ORDER — NIFEDIPINE 30 MG/1
30 TABLET, EXTENDED RELEASE ORAL AT BEDTIME
Refills: 0 | Status: ACTIVE | COMMUNITY

## 2020-02-24 RX ORDER — UBIDECARENONE/VIT E ACET 100MG-5
CAPSULE ORAL
Refills: 0 | Status: ACTIVE | COMMUNITY

## 2020-02-24 NOTE — PHYSICAL EXAM
[2+] : left 2+ [No Rash or Lesion] : No rash or lesion [Alert] : alert [JVD] : no jugular venous distention  [Normal Breath Sounds] : Normal breath sounds [Normal Heart Sounds] : normal heart sounds [Abdomen Masses] : No abdominal masses [Ankle Swelling (On Exam)] : not present [Skin Ulcer] : no ulcer [de-identified] : appears well  [de-identified] : intact

## 2020-02-24 NOTE — HISTORY OF PRESENT ILLNESS
[FreeTextEntry1] : 65 yo male with history of esrd with thrombosed right avf s/p renal transplant no longer on hd presents for follow up \par pt without any complaints at this time \par

## 2020-06-01 ENCOUNTER — RX RENEWAL (OUTPATIENT)
Age: 65
End: 2020-06-01

## 2020-06-10 RX ORDER — TACROLIMUS 1 MG/1
1 CAPSULE ORAL
Qty: 270 | Refills: 3 | Status: ACTIVE | COMMUNITY
Start: 2017-01-11 | End: 1900-01-01

## 2020-06-10 RX ORDER — PREDNISONE 5 MG/1
5 TABLET ORAL
Qty: 180 | Refills: 3 | Status: ACTIVE | COMMUNITY
Start: 2020-06-01 | End: 1900-01-01

## 2020-06-10 RX ORDER — MYCOPHENOLATE MOFETIL 250 MG/1
250 CAPSULE ORAL
Qty: 180 | Refills: 3 | Status: ACTIVE | COMMUNITY
Start: 2020-06-01 | End: 1900-01-01

## 2020-10-01 PROBLEM — Z94.0 KIDNEY TRANSPLANT RECIPIENT: Status: ACTIVE | Noted: 2018-01-03

## 2020-10-12 NOTE — DISCHARGE NOTE ADULT - ADMISSION DATE +STARTOFVISITDATE
----- Message from Laura Cline 
Statement Selected

## 2021-02-22 ENCOUNTER — APPOINTMENT (OUTPATIENT)
Dept: VASCULAR SURGERY | Facility: CLINIC | Age: 66
End: 2021-02-22

## 2021-07-21 NOTE — PROGRESS NOTE ADULT - PROBLEM/PLAN-1
DISPLAY PLAN FREE TEXT
minimum assist (75% patients effort)

## 2021-12-16 NOTE — H&P ADULT. - PSH
Referring Physician (Optional): Sin History of detached retina repair  right eye  S/P kidney transplant  2011  S/P TURP

## 2022-03-07 ENCOUNTER — INPATIENT (INPATIENT)
Facility: HOSPITAL | Age: 67
LOS: 4 days | Discharge: HOME | DRG: 866 | End: 2022-03-12
Attending: INTERNAL MEDICINE | Admitting: INTERNAL MEDICINE
Payer: COMMERCIAL

## 2022-03-07 VITALS
RESPIRATION RATE: 18 BRPM | OXYGEN SATURATION: 100 % | WEIGHT: 259.93 LBS | TEMPERATURE: 97 F | HEIGHT: 74 IN | DIASTOLIC BLOOD PRESSURE: 81 MMHG | SYSTOLIC BLOOD PRESSURE: 165 MMHG | HEART RATE: 75 BPM

## 2022-03-07 DIAGNOSIS — Z98.89 OTHER SPECIFIED POSTPROCEDURAL STATES: Chronic | ICD-10-CM

## 2022-03-07 DIAGNOSIS — D84.9 IMMUNODEFICIENCY, UNSPECIFIED: ICD-10-CM

## 2022-03-07 DIAGNOSIS — E11.9 TYPE 2 DIABETES MELLITUS WITHOUT COMPLICATIONS: ICD-10-CM

## 2022-03-07 DIAGNOSIS — R07.9 CHEST PAIN, UNSPECIFIED: ICD-10-CM

## 2022-03-07 DIAGNOSIS — D50.9 IRON DEFICIENCY ANEMIA, UNSPECIFIED: ICD-10-CM

## 2022-03-07 DIAGNOSIS — B02.9 ZOSTER WITHOUT COMPLICATIONS: ICD-10-CM

## 2022-03-07 DIAGNOSIS — Z94.0 KIDNEY TRANSPLANT STATUS: ICD-10-CM

## 2022-03-07 DIAGNOSIS — Z29.9 ENCOUNTER FOR PROPHYLACTIC MEASURES, UNSPECIFIED: ICD-10-CM

## 2022-03-07 DIAGNOSIS — Z94.0 KIDNEY TRANSPLANT STATUS: Chronic | ICD-10-CM

## 2022-03-07 DIAGNOSIS — I10 ESSENTIAL (PRIMARY) HYPERTENSION: ICD-10-CM

## 2022-03-07 LAB
ALBUMIN SERPL ELPH-MCNC: 4.3 G/DL — SIGNIFICANT CHANGE UP (ref 3.3–5)
ALP SERPL-CCNC: 71 U/L — SIGNIFICANT CHANGE UP (ref 40–120)
ALT FLD-CCNC: 16 U/L — SIGNIFICANT CHANGE UP (ref 10–45)
ANION GAP SERPL CALC-SCNC: 10 MMOL/L — SIGNIFICANT CHANGE UP (ref 5–17)
ANISOCYTOSIS BLD QL: SLIGHT — SIGNIFICANT CHANGE UP
AST SERPL-CCNC: 14 U/L — SIGNIFICANT CHANGE UP (ref 10–40)
BASOPHILS # BLD AUTO: 0.04 K/UL — SIGNIFICANT CHANGE UP (ref 0–0.2)
BASOPHILS NFR BLD AUTO: 0.9 % — SIGNIFICANT CHANGE UP (ref 0–2)
BILIRUB SERPL-MCNC: 0.3 MG/DL — SIGNIFICANT CHANGE UP (ref 0.2–1.2)
BUN SERPL-MCNC: 23 MG/DL — SIGNIFICANT CHANGE UP (ref 7–23)
CALCIUM SERPL-MCNC: 9.2 MG/DL — SIGNIFICANT CHANGE UP (ref 8.4–10.5)
CHLORIDE SERPL-SCNC: 105 MMOL/L — SIGNIFICANT CHANGE UP (ref 96–108)
CO2 SERPL-SCNC: 26 MMOL/L — SIGNIFICANT CHANGE UP (ref 22–31)
CREAT SERPL-MCNC: 1.84 MG/DL — HIGH (ref 0.5–1.3)
EGFR: 40 ML/MIN/1.73M2 — LOW
ELLIPTOCYTES BLD QL SMEAR: SLIGHT — SIGNIFICANT CHANGE UP
EOSINOPHIL # BLD AUTO: 0.08 K/UL — SIGNIFICANT CHANGE UP (ref 0–0.5)
EOSINOPHIL NFR BLD AUTO: 1.8 % — SIGNIFICANT CHANGE UP (ref 0–6)
GLUCOSE BLDC GLUCOMTR-MCNC: 192 MG/DL — HIGH (ref 70–99)
GLUCOSE BLDC GLUCOMTR-MCNC: 294 MG/DL — HIGH (ref 70–99)
GLUCOSE SERPL-MCNC: 234 MG/DL — HIGH (ref 70–99)
HCT VFR BLD CALC: 40.3 % — SIGNIFICANT CHANGE UP (ref 39–50)
HGB BLD-MCNC: 11.8 G/DL — LOW (ref 13–17)
LYMPHOCYTES # BLD AUTO: 0.45 K/UL — LOW (ref 1–3.3)
LYMPHOCYTES # BLD AUTO: 9.7 % — LOW (ref 13–44)
MANUAL SMEAR VERIFICATION: SIGNIFICANT CHANGE UP
MCHC RBC-ENTMCNC: 21.6 PG — LOW (ref 27–34)
MCHC RBC-ENTMCNC: 29.3 GM/DL — LOW (ref 32–36)
MCV RBC AUTO: 73.8 FL — LOW (ref 80–100)
MICROCYTES BLD QL: SLIGHT — SIGNIFICANT CHANGE UP
MONOCYTES # BLD AUTO: 0.65 K/UL — SIGNIFICANT CHANGE UP (ref 0–0.9)
MONOCYTES NFR BLD AUTO: 14.2 % — HIGH (ref 2–14)
NEUTROPHILS # BLD AUTO: 3.38 K/UL — SIGNIFICANT CHANGE UP (ref 1.8–7.4)
NEUTROPHILS NFR BLD AUTO: 73.4 % — SIGNIFICANT CHANGE UP (ref 43–77)
OVALOCYTES BLD QL SMEAR: SLIGHT — SIGNIFICANT CHANGE UP
PLAT MORPH BLD: NORMAL — SIGNIFICANT CHANGE UP
PLATELET # BLD AUTO: 169 K/UL — SIGNIFICANT CHANGE UP (ref 150–400)
POIKILOCYTOSIS BLD QL AUTO: SIGNIFICANT CHANGE UP
POTASSIUM SERPL-MCNC: 4.3 MMOL/L — SIGNIFICANT CHANGE UP (ref 3.5–5.3)
POTASSIUM SERPL-SCNC: 4.3 MMOL/L — SIGNIFICANT CHANGE UP (ref 3.5–5.3)
PROT SERPL-MCNC: 6.5 G/DL — SIGNIFICANT CHANGE UP (ref 6–8.3)
RBC # BLD: 5.46 M/UL — SIGNIFICANT CHANGE UP (ref 4.2–5.8)
RBC # FLD: 16.4 % — HIGH (ref 10.3–14.5)
RBC BLD AUTO: ABNORMAL
SARS-COV-2 RNA SPEC QL NAA+PROBE: SIGNIFICANT CHANGE UP
SCHISTOCYTES BLD QL AUTO: SLIGHT — SIGNIFICANT CHANGE UP
SODIUM SERPL-SCNC: 141 MMOL/L — SIGNIFICANT CHANGE UP (ref 135–145)
SPHEROCYTES BLD QL SMEAR: SLIGHT — SIGNIFICANT CHANGE UP
TACROLIMUS SERPL-MCNC: 13.6 NG/ML — SIGNIFICANT CHANGE UP
TROPONIN T, HIGH SENSITIVITY RESULT: 27 NG/L — SIGNIFICANT CHANGE UP (ref 0–51)
TROPONIN T, HIGH SENSITIVITY RESULT: 29 NG/L — SIGNIFICANT CHANGE UP (ref 0–51)
WBC # BLD: 4.6 K/UL — SIGNIFICANT CHANGE UP (ref 3.8–10.5)
WBC # FLD AUTO: 4.6 K/UL — SIGNIFICANT CHANGE UP (ref 3.8–10.5)

## 2022-03-07 PROCEDURE — 99223 1ST HOSP IP/OBS HIGH 75: CPT

## 2022-03-07 PROCEDURE — 99285 EMERGENCY DEPT VISIT HI MDM: CPT

## 2022-03-07 PROCEDURE — 71045 X-RAY EXAM CHEST 1 VIEW: CPT | Mod: 26

## 2022-03-07 PROCEDURE — 93010 ELECTROCARDIOGRAM REPORT: CPT

## 2022-03-07 RX ORDER — ATORVASTATIN CALCIUM 80 MG/1
80 TABLET, FILM COATED ORAL AT BEDTIME
Refills: 0 | Status: DISCONTINUED | OUTPATIENT
Start: 2022-03-07 | End: 2022-03-12

## 2022-03-07 RX ORDER — INSULIN LISPRO 100/ML
VIAL (ML) SUBCUTANEOUS
Refills: 0 | Status: DISCONTINUED | OUTPATIENT
Start: 2022-03-07 | End: 2022-03-12

## 2022-03-07 RX ORDER — ASPIRIN/CALCIUM CARB/MAGNESIUM 324 MG
1 TABLET ORAL
Qty: 0 | Refills: 0 | DISCHARGE

## 2022-03-07 RX ORDER — HEPARIN SODIUM 5000 [USP'U]/ML
5000 INJECTION INTRAVENOUS; SUBCUTANEOUS EVERY 12 HOURS
Refills: 0 | Status: DISCONTINUED | OUTPATIENT
Start: 2022-03-07 | End: 2022-03-12

## 2022-03-07 RX ORDER — INSULIN LISPRO 100/ML
5 VIAL (ML) SUBCUTANEOUS
Refills: 0 | Status: DISCONTINUED | OUTPATIENT
Start: 2022-03-07 | End: 2022-03-09

## 2022-03-07 RX ORDER — ERGOCALCIFEROL 1.25 MG/1
1 CAPSULE ORAL
Qty: 0 | Refills: 0 | DISCHARGE

## 2022-03-07 RX ORDER — NIFEDIPINE 30 MG
2 TABLET, EXTENDED RELEASE 24 HR ORAL
Qty: 0 | Refills: 0 | DISCHARGE

## 2022-03-07 RX ORDER — DEXTROSE 50 % IN WATER 50 %
12.5 SYRINGE (ML) INTRAVENOUS ONCE
Refills: 0 | Status: DISCONTINUED | OUTPATIENT
Start: 2022-03-07 | End: 2022-03-12

## 2022-03-07 RX ORDER — DEXTROSE MONOHYDRATE, SODIUM CHLORIDE, AND POTASSIUM CHLORIDE 50; .745; 4.5 G/1000ML; G/1000ML; G/1000ML
1000 INJECTION, SOLUTION INTRAVENOUS
Refills: 0 | Status: DISCONTINUED | OUTPATIENT
Start: 2022-03-07 | End: 2022-03-08

## 2022-03-07 RX ORDER — ASPIRIN/CALCIUM CARB/MAGNESIUM 324 MG
81 TABLET ORAL DAILY
Refills: 0 | Status: DISCONTINUED | OUTPATIENT
Start: 2022-03-08 | End: 2022-03-12

## 2022-03-07 RX ORDER — DOCUSATE SODIUM 100 MG
1 CAPSULE ORAL
Qty: 0 | Refills: 0 | DISCHARGE

## 2022-03-07 RX ORDER — NIFEDIPINE 30 MG
60 TABLET, EXTENDED RELEASE 24 HR ORAL DAILY
Refills: 0 | Status: DISCONTINUED | OUTPATIENT
Start: 2022-03-08 | End: 2022-03-12

## 2022-03-07 RX ORDER — NIFEDIPINE 30 MG
30 TABLET, EXTENDED RELEASE 24 HR ORAL AT BEDTIME
Refills: 0 | Status: DISCONTINUED | OUTPATIENT
Start: 2022-03-07 | End: 2022-03-08

## 2022-03-07 RX ORDER — INSULIN DETEMIR 100/ML (3)
10 INSULIN PEN (ML) SUBCUTANEOUS
Qty: 0 | Refills: 0 | DISCHARGE

## 2022-03-07 RX ORDER — SODIUM CHLORIDE 9 MG/ML
1000 INJECTION, SOLUTION INTRAVENOUS
Refills: 0 | Status: DISCONTINUED | OUTPATIENT
Start: 2022-03-07 | End: 2022-03-12

## 2022-03-07 RX ORDER — LABETALOL HCL 100 MG
1 TABLET ORAL
Qty: 90 | Refills: 0

## 2022-03-07 RX ORDER — TAMSULOSIN HYDROCHLORIDE 0.4 MG/1
0.8 CAPSULE ORAL AT BEDTIME
Refills: 0 | Status: DISCONTINUED | OUTPATIENT
Start: 2022-03-07 | End: 2022-03-12

## 2022-03-07 RX ORDER — MYCOPHENOLATE MOFETIL 250 MG/1
250 CAPSULE ORAL
Refills: 0 | Status: DISCONTINUED | OUTPATIENT
Start: 2022-03-07 | End: 2022-03-08

## 2022-03-07 RX ORDER — CARVEDILOL PHOSPHATE 80 MG/1
1 CAPSULE, EXTENDED RELEASE ORAL
Qty: 0 | Refills: 0 | DISCHARGE

## 2022-03-07 RX ORDER — TAMSULOSIN HYDROCHLORIDE 0.4 MG/1
2 CAPSULE ORAL
Qty: 0 | Refills: 0 | DISCHARGE

## 2022-03-07 RX ORDER — NIFEDIPINE 30 MG
1 TABLET, EXTENDED RELEASE 24 HR ORAL
Qty: 0 | Refills: 0 | DISCHARGE

## 2022-03-07 RX ORDER — ACYCLOVIR SODIUM 500 MG
800 VIAL (EA) INTRAVENOUS EVERY 8 HOURS
Refills: 0 | Status: DISCONTINUED | OUTPATIENT
Start: 2022-03-07 | End: 2022-03-08

## 2022-03-07 RX ORDER — FUROSEMIDE 40 MG
40 TABLET ORAL DAILY
Refills: 0 | Status: DISCONTINUED | OUTPATIENT
Start: 2022-03-08 | End: 2022-03-12

## 2022-03-07 RX ORDER — INSULIN LISPRO 100/ML
VIAL (ML) SUBCUTANEOUS AT BEDTIME
Refills: 0 | Status: DISCONTINUED | OUTPATIENT
Start: 2022-03-07 | End: 2022-03-12

## 2022-03-07 RX ORDER — INSULIN ASPART 100 [IU]/ML
5 INJECTION, SOLUTION SUBCUTANEOUS
Qty: 0 | Refills: 0 | DISCHARGE

## 2022-03-07 RX ORDER — TAMSULOSIN HYDROCHLORIDE 0.4 MG/1
1 CAPSULE ORAL
Qty: 0 | Refills: 0 | DISCHARGE

## 2022-03-07 RX ORDER — FUROSEMIDE 40 MG
2 TABLET ORAL
Qty: 0 | Refills: 0 | DISCHARGE

## 2022-03-07 RX ORDER — INSULIN DETEMIR 100/ML (3)
12 INSULIN PEN (ML) SUBCUTANEOUS
Qty: 0 | Refills: 0 | DISCHARGE

## 2022-03-07 RX ORDER — FUROSEMIDE 40 MG
20 TABLET ORAL ONCE
Refills: 0 | Status: COMPLETED | OUTPATIENT
Start: 2022-03-07 | End: 2022-03-07

## 2022-03-07 RX ORDER — TACROLIMUS 5 MG/1
4 CAPSULE ORAL EVERY 12 HOURS
Refills: 0 | Status: DISCONTINUED | OUTPATIENT
Start: 2022-03-07 | End: 2022-03-12

## 2022-03-07 RX ORDER — DEXTROSE 50 % IN WATER 50 %
15 SYRINGE (ML) INTRAVENOUS ONCE
Refills: 0 | Status: DISCONTINUED | OUTPATIENT
Start: 2022-03-07 | End: 2022-03-12

## 2022-03-07 RX ORDER — MYCOPHENOLATE MOFETIL 250 MG/1
1 CAPSULE ORAL
Qty: 0 | Refills: 0 | DISCHARGE

## 2022-03-07 RX ORDER — GLUCAGON INJECTION, SOLUTION 0.5 MG/.1ML
1 INJECTION, SOLUTION SUBCUTANEOUS ONCE
Refills: 0 | Status: DISCONTINUED | OUTPATIENT
Start: 2022-03-07 | End: 2022-03-12

## 2022-03-07 RX ORDER — ACYCLOVIR SODIUM 500 MG
800 VIAL (EA) INTRAVENOUS ONCE
Refills: 0 | Status: COMPLETED | OUTPATIENT
Start: 2022-03-07 | End: 2022-03-07

## 2022-03-07 RX ORDER — CARVEDILOL PHOSPHATE 80 MG/1
12.5 CAPSULE, EXTENDED RELEASE ORAL EVERY 12 HOURS
Refills: 0 | Status: DISCONTINUED | OUTPATIENT
Start: 2022-03-07 | End: 2022-03-12

## 2022-03-07 RX ORDER — TACROLIMUS 5 MG/1
3 CAPSULE ORAL
Qty: 0 | Refills: 0 | DISCHARGE

## 2022-03-07 RX ORDER — ROSUVASTATIN CALCIUM 5 MG/1
1 TABLET ORAL
Qty: 0 | Refills: 0 | DISCHARGE

## 2022-03-07 RX ORDER — CHOLECALCIFEROL (VITAMIN D3) 125 MCG
1 CAPSULE ORAL
Qty: 0 | Refills: 0 | DISCHARGE

## 2022-03-07 RX ORDER — INSULIN ASPART 100 [IU]/ML
8 INJECTION, SOLUTION SUBCUTANEOUS
Qty: 0 | Refills: 0 | DISCHARGE

## 2022-03-07 RX ORDER — ACYCLOVIR SODIUM 500 MG
VIAL (EA) INTRAVENOUS
Refills: 0 | Status: DISCONTINUED | OUTPATIENT
Start: 2022-03-07 | End: 2022-03-08

## 2022-03-07 RX ORDER — ASPIRIN/CALCIUM CARB/MAGNESIUM 324 MG
81 TABLET ORAL ONCE
Refills: 0 | Status: COMPLETED | OUTPATIENT
Start: 2022-03-07 | End: 2022-03-07

## 2022-03-07 RX ORDER — INSULIN GLARGINE 100 [IU]/ML
12 INJECTION, SOLUTION SUBCUTANEOUS AT BEDTIME
Refills: 0 | Status: DISCONTINUED | OUTPATIENT
Start: 2022-03-07 | End: 2022-03-08

## 2022-03-07 RX ORDER — DEXTROSE 50 % IN WATER 50 %
25 SYRINGE (ML) INTRAVENOUS ONCE
Refills: 0 | Status: DISCONTINUED | OUTPATIENT
Start: 2022-03-07 | End: 2022-03-12

## 2022-03-07 RX ORDER — TACROLIMUS 5 MG/1
4 CAPSULE ORAL
Qty: 0 | Refills: 0 | DISCHARGE

## 2022-03-07 RX ORDER — CALCIUM CARBONATE 500(1250)
1 TABLET ORAL
Qty: 0 | Refills: 0 | DISCHARGE

## 2022-03-07 RX ADMIN — INSULIN GLARGINE 12 UNIT(S): 100 INJECTION, SOLUTION SUBCUTANEOUS at 23:41

## 2022-03-07 RX ADMIN — ATORVASTATIN CALCIUM 80 MILLIGRAM(S): 80 TABLET, FILM COATED ORAL at 23:35

## 2022-03-07 RX ADMIN — Medication 30 MILLIGRAM(S): at 23:37

## 2022-03-07 RX ADMIN — Medication 81 MILLIGRAM(S): at 17:06

## 2022-03-07 RX ADMIN — Medication 1: at 23:39

## 2022-03-07 RX ADMIN — TAMSULOSIN HYDROCHLORIDE 0.8 MILLIGRAM(S): 0.4 CAPSULE ORAL at 23:37

## 2022-03-07 RX ADMIN — MYCOPHENOLATE MOFETIL 250 MILLIGRAM(S): 250 CAPSULE ORAL at 23:35

## 2022-03-07 RX ADMIN — Medication 20 MILLIGRAM(S): at 23:34

## 2022-03-07 RX ADMIN — Medication 166 MILLIGRAM(S): at 19:32

## 2022-03-07 RX ADMIN — CARVEDILOL PHOSPHATE 12.5 MILLIGRAM(S): 80 CAPSULE, EXTENDED RELEASE ORAL at 23:37

## 2022-03-07 NOTE — ED PROVIDER NOTE - NSICDXPASTSURGICALHX_GEN_ALL_CORE_FT
PAST SURGICAL HISTORY:  History of detached retina repair right eye    No significant past surgical history     S/P kidney transplant 2011    S/P TURP

## 2022-03-07 NOTE — ED PROVIDER NOTE - NSICDXPASTMEDICALHX_GEN_ALL_CORE_FT
PAST MEDICAL HISTORY:  Bell's palsy 2004    Benign prostatic hypertrophy     Diabetes     Diabetic neuropathy associated with type 2 diabetes mellitus     Dyslipidemia     ESRD (end stage renal disease) s/p renal transplant    Hypertension     Kidney transplant status     Legally blind blind in right eye and limited vision in left eye    Obesity     Type 2 diabetes mellitus

## 2022-03-07 NOTE — ED ADULT TRIAGE NOTE - CHIEF COMPLAINT QUOTE
l arm rad l side chest  x  1week  Kidney transplant 2011  Denies sob  had Covid January 2022 l arm rad l side chest  x  1week  Kidney transplant 2011  Denies sob  had Covid January 2022  Also c/o rash l hand

## 2022-03-07 NOTE — ED ADULT NURSE NOTE - OBJECTIVE STATEMENT
66y Male presents to the ED with Left arm and shoulder pain that is radiating 66y Male presents to the ED with Left arm and shoulder pain that is radiating to left upper chest. Pt states the pain was 7/10. Pt is A&Ox3 with wife at bedside. 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 PMH of HTN and diabetes which is managed by medication and exercise, Pt is blind in the right eye and he states he cannot see great out of the left eye. PSH of cataract surgery and prostate surgery. Pt denies SOB, n/v/d, headache, and dysuria. Pt endorses CP and numbness and tingling in the left arm. Pt safety is maintained, wheels locked, side rails raised, and bed in the lowest position.

## 2022-03-07 NOTE — ED PROVIDER NOTE - CLINICAL SUMMARY MEDICAL DECISION MAKING FREE TEXT BOX
ATTG: : vesicular rash on extremities, will treat with acyclovir, check labs, ivf, check tacrolimus level, notify transplant team, re eval for dispo

## 2022-03-07 NOTE — ED ADULT NURSE NOTE - CHIEF COMPLAINT QUOTE
l arm rad l side chest  x  1week  Kidney transplant 2011  Denies sob  had Covid January 2022  Also c/o rash l hand

## 2022-03-07 NOTE — H&P ADULT - PROBLEM SELECTOR PLAN 5
- patient requests to only have half dose of furosemide this evening  - c/w carvedilol, furosemide, nifedipine

## 2022-03-07 NOTE — H&P ADULT - NSICDXFAMILYHX_GEN_ALL_CORE_FT
FAMILY HISTORY:  Father  Still living? Unknown  FH: kidney disease, Age at diagnosis: Age Unknown    Mother  Still living? Unknown  FH: kidney disease, Age at diagnosis: Age Unknown

## 2022-03-07 NOTE — ED PROVIDER NOTE - OBJECTIVE STATEMENT
Barrera Mitchell, PGY-2- 66 year old male with a pmhx of DM, HTN, ESRD s/p renal transplant in 2011, on Prograf and Cellcept, presents to ED for evaluation of chest pain and rash. Pt reports pain began 1 week ago, radiating down L arm. Non exertional. Noticed rash that started today on his left hand. Reports pain at site. Has not had fever, chills, sob, vomiting, diarrhea, dysuria. Has been urinating normally. No hx of shingles or shingles vaccine.

## 2022-03-07 NOTE — H&P ADULT - PROBLEM SELECTOR PLAN 1
- ID consult in the am  - c/w IV acyclovir overnight as patient is immunosuppressed. IV fluids to mitigate risk of crystal nephropathy.  - appears localized for now to hand & arm for now overlying Left C7 dermatome. may develop more over C8/T1 given reported pain  - if develops worsening pain then should start gabapentin

## 2022-03-07 NOTE — H&P ADULT - NSHPREVIEWOFSYSTEMS_GEN_ALL_CORE
CONSTITUTIONAL: No fever, no chills  EYES: No eye pain, no acute blindness  ENMT: no pain in mouth, no cuts on tongue  RESPIRATORY: No cough, No sob  CARDIOVASCULAR: CP+, no palpitations  GASTROINTESTINAL: no abdominal pain, no n/v/d  GENITOURINARY: No dysuria, no hematuria  Heme/Lymph: No easy bruising, no swelling of neck lymph nodes  NEUROLOGICAL: No seizure, No acute paralysis  SKIN: No itching, rash over left hand  MUSCULOSKELETAL: No acute joint pain, no joint swelling, left hand and arm pain

## 2022-03-07 NOTE — H&P ADULT - NSHPADDITIONALINFOADULT_GEN_ALL_CORE
Patient assigned to me by night hospitalist in charge for management and care for patient for this evening only. Care to be resumed by day hospitalist Dr. Sebastian 08:00 in the morning and thereafter.     Caesar Fernández MD  Medicine Attending  Department of Hospital Medicine  pager: [phone] (available from 20:00 to 08:00)

## 2022-03-07 NOTE — H&P ADULT - NSHPLABSRESULTS_GEN_ALL_CORE
Personally reviewed available labs, imaging and ekg  [1]  CBC Full  -  ( 07 Mar 2022 15:45 )  WBC Count : 4.60 K/uL  RBC Count : 5.46 M/uL  Hemoglobin : 11.8 g/dL  Hematocrit : 40.3 %  Platelet Count - Automated : 169 K/uL  Mean Cell Volume : 73.8 fl  Mean Cell Hemoglobin : 21.6 pg  Mean Cell Hemoglobin Concentration : 29.3 gm/dL  Auto Neutrophil # : 3.38 K/uL  Auto Lymphocyte # : 0.45 K/uL  Auto Monocyte # : 0.65 K/uL  Auto Eosinophil # : 0.08 K/uL  Auto Basophil # : 0.04 K/uL  Auto Neutrophil % : 73.4 %  Auto Lymphocyte % : 9.7 %  Auto Monocyte % : 14.2 %  Auto Eosinophil % : 1.8 %  Auto Basophil % : 0.9 %    03-07    141  |  105  |  23  ----------------------------<  234<H>  4.3   |  26  |  1.84<H>    Ca    9.2      07 Mar 2022 15:45    TPro  6.5  /  Alb  4.3  /  TBili  0.3  /  DBili  x   /  AST  14  /  ALT  16  /  AlkPhos  71  03-07    Troponin T, High Sensitivity Result: 29:  (03.07.22 @ 15:45)  Troponin T, High Sensitivity Result: 27: (03.07.22 @ 17:16)    Imaging:  [ 2] I independently reviewed CXR and no lobar opacification is appreciated  EKG:  [2] I independently reviewed EKG/cardiac tracing and NSR appreciated    Review of old records:  [2] I personally reviewed previous records which identified history of ESRD s/p kidney transplant

## 2022-03-07 NOTE — H&P ADULT - ASSESSMENT
66M w/ ESRD s/p kidney transplant, DM2 on insulin, HTN p/w CP and left hand rash associated w/ pain admit for further evaluation of vesicular rash requiring evaluation/management for herpes zoster infection in immunocompetent host.

## 2022-03-07 NOTE — ED PROVIDER NOTE - PROGRESS NOTE DETAILS
Barrera Mitchell, PGY-2- Patient to be admitted for disseminated shingles. Discussed with Dr. Perla, who recommends admission to hospitalist.  Patient's nephro- Dr. iHgh Barrera Mitchell, PGY-2- Patient to be admitted for disseminated shingles. Discussed with Dr. Perla, who recommends admission.  Patient's nephro- Dr. High Barrera Mitchell, PGY-2- Patient to be admitted for disseminated zoster. Unlikely that cp is caused by cardiac etiology. Pt with stable troponins, likely from the shingles JUDI Odell, Attending: signed out from [facility]. Admitted for disseminated zoster. Valacyclovir ordered. Admitted. Stable.

## 2022-03-07 NOTE — H&P ADULT - HISTORY OF PRESENT ILLNESS
66M w/ ESRD s/p kidney transplant, DM2 on insulin, HTN p/w CP and left hand rash associated w/ pain. The patient reports experiencing intermittent CP for 1 week duration described as Left-sided near armpit, muscle cramp sensation, at times 7/10 severity, indicates similar sensation when he had port placed. Additionally, the patient also reports experiencing new onset left hand pain, swelling, and rash with pain moving up left arm. The pain is described a muscle cramp sensation similar to when the patient experienced a DVT and denies sensation of burning or sharpness. The rash appears as skin bumps. The patient denies fever, chills, palpitations, sob, cough, n/v/d, or painful urination. The patient denies history of shingles or herpes simplex virus. He is on immunosuppression medications for his kidney transplant.

## 2022-03-07 NOTE — ED PROVIDER NOTE - PHYSICAL EXAMINATION
General: well appearing, no acute distress, AOx3  Skin: vesicular appearing rash on C7 dermatome at hand, no vesicles on proximal upper extremity or chest   Head: normocephalic, atraumatic  Eyes: clear conjunctiva, EOMI  ENMT: airway patent, no nasal discharge  Cardiovascular: normal rate, normal rhythm, S1/S2  Pulmonary: clear to auscultation bilaterally, no rales, rhonchi, or wheeze  Abdomen: soft, nontender, no guarding   Musculoskeletal: moving extremities well, no deformity  Psych: normal mood, normal affect

## 2022-03-07 NOTE — H&P ADULT - NSHPPHYSICALEXAM_GEN_ALL_CORE
Vital Signs Last 24 Hrs  T(C): 36.5 (07 Mar 2022 21:38), Max: 36.7 (07 Mar 2022 20:33)  T(F): 97.7 (07 Mar 2022 21:38), Max: 98 (07 Mar 2022 20:33)  HR: 72 (07 Mar 2022 21:38) (61 - 75)  BP: 147/71 (07 Mar 2022 21:38) (147/71 - 182/91)  BP(mean): 104 (07 Mar 2022 15:49) (104 - 104)  RR: 18 (07 Mar 2022 21:38) (18 - 20)  SpO2: 97% (07 Mar 2022 21:38) (97% - 100%) on RA    GENERAL: NAD, well-developed  HEAD:  Atraumatic, Normocephalic  EYES: EOMI, PERRL, conjunctiva and sclera clear  ENMT: MMM, good dentition  NECK: Supple, trachea midline  Lung: normal work of breathing, cta b/l  Cardiovascular: S1&S2+, rrr, no m/r/g appreciated; no pitting edema appreciated in b/l LE  ABDOMEN: bs+, soft, nt, nd, no appreciable masses  : No gilmore catheter, no CVA tenderness  Neuro: A&Ox3, no flaccid paralysis in extremities appreciated  SKIN: warm and dry, no visible purulence in exposed areas, normal skin turgor, Left hand has grouped vesicles on erythematous base additional a small area of grouped vesicles on upper arm, no crusting and all appear to be at similar stage of disease, no rash appreciated over the chest, appears like rash is over C7/C8 dermatome

## 2022-03-07 NOTE — ED PROVIDER NOTE - ATTENDING CONTRIBUTION TO CARE
67 y/o m with pmhx DM and s/p renal transplant presents with rash on left hand that has been increasing. Describes the rash as painful on hand with vesicles.   Gen.    HEENT:    Lungs:    CVS: S1S2   Abd;    Ext:   Neuro:  MSK: 65 y/o m with pmhx DM and s/p renal transplant presents with rash on left hand that has been increasing. Describes the rash as painful on hand with vesicles. wife with him at the bedside. no fever no vomiting no diarrhea. no abd pain  Gen.  no acute distress  HEENT:  perrl eomi  Lungs:  b/l bs no crackles no rhonchi no wheezing  CVS: S1S2   Abd;  soft no guarding no distention  Ext: no pitting edema, left hand with vesicles over dorsal surface, between first / second / third fingers with base of erythema, full range of motion. few discrete lesions on left arm / elbow.   Neuro: aaox3 no focal deficits  MSK:strength 5/5 b/l upper and lower ext.

## 2022-03-08 DIAGNOSIS — E66.9 OBESITY, UNSPECIFIED: ICD-10-CM

## 2022-03-08 LAB
A1C WITH ESTIMATED AVERAGE GLUCOSE RESULT: 8.1 % — HIGH (ref 4–5.6)
ALBUMIN SERPL ELPH-MCNC: 4 G/DL — SIGNIFICANT CHANGE UP (ref 3.3–5)
ALP SERPL-CCNC: 68 U/L — SIGNIFICANT CHANGE UP (ref 40–120)
ALT FLD-CCNC: 14 U/L — SIGNIFICANT CHANGE UP (ref 10–45)
ANION GAP SERPL CALC-SCNC: 11 MMOL/L — SIGNIFICANT CHANGE UP (ref 5–17)
APPEARANCE UR: CLEAR — SIGNIFICANT CHANGE UP
AST SERPL-CCNC: 11 U/L — SIGNIFICANT CHANGE UP (ref 10–40)
BASOPHILS # BLD AUTO: 0.02 K/UL — SIGNIFICANT CHANGE UP (ref 0–0.2)
BASOPHILS NFR BLD AUTO: 0.5 % — SIGNIFICANT CHANGE UP (ref 0–2)
BILIRUB SERPL-MCNC: 0.4 MG/DL — SIGNIFICANT CHANGE UP (ref 0.2–1.2)
BILIRUB UR-MCNC: NEGATIVE — SIGNIFICANT CHANGE UP
BUN SERPL-MCNC: 21 MG/DL — SIGNIFICANT CHANGE UP (ref 7–23)
CALCIUM SERPL-MCNC: 8.7 MG/DL — SIGNIFICANT CHANGE UP (ref 8.4–10.5)
CHLORIDE SERPL-SCNC: 105 MMOL/L — SIGNIFICANT CHANGE UP (ref 96–108)
CO2 SERPL-SCNC: 23 MMOL/L — SIGNIFICANT CHANGE UP (ref 22–31)
COLOR SPEC: SIGNIFICANT CHANGE UP
CREAT ?TM UR-MCNC: 206 MG/DL — SIGNIFICANT CHANGE UP
CREAT SERPL-MCNC: 1.6 MG/DL — HIGH (ref 0.5–1.3)
DIFF PNL FLD: ABNORMAL
EGFR: 47 ML/MIN/1.73M2 — LOW
EOSINOPHIL # BLD AUTO: 0.07 K/UL — SIGNIFICANT CHANGE UP (ref 0–0.5)
EOSINOPHIL NFR BLD AUTO: 1.9 % — SIGNIFICANT CHANGE UP (ref 0–6)
ESTIMATED AVERAGE GLUCOSE: 186 MG/DL — HIGH (ref 68–114)
GLUCOSE BLDC GLUCOMTR-MCNC: 224 MG/DL — HIGH (ref 70–99)
GLUCOSE BLDC GLUCOMTR-MCNC: 230 MG/DL — HIGH (ref 70–99)
GLUCOSE BLDC GLUCOMTR-MCNC: 241 MG/DL — HIGH (ref 70–99)
GLUCOSE BLDC GLUCOMTR-MCNC: 86 MG/DL — SIGNIFICANT CHANGE UP (ref 70–99)
GLUCOSE SERPL-MCNC: 248 MG/DL — HIGH (ref 70–99)
GLUCOSE UR QL: NEGATIVE — SIGNIFICANT CHANGE UP
HCT VFR BLD CALC: 40.5 % — SIGNIFICANT CHANGE UP (ref 39–50)
HGB BLD-MCNC: 12 G/DL — LOW (ref 13–17)
HSV+VZV DNA SPEC QL NAA+PROBE: ABNORMAL
IMM GRANULOCYTES NFR BLD AUTO: 0.5 % — SIGNIFICANT CHANGE UP (ref 0–1.5)
KETONES UR-MCNC: NEGATIVE — SIGNIFICANT CHANGE UP
LEUKOCYTE ESTERASE UR-ACNC: NEGATIVE — SIGNIFICANT CHANGE UP
LYMPHOCYTES # BLD AUTO: 0.6 K/UL — LOW (ref 1–3.3)
LYMPHOCYTES # BLD AUTO: 16.1 % — SIGNIFICANT CHANGE UP (ref 13–44)
MAGNESIUM SERPL-MCNC: 1.7 MG/DL — SIGNIFICANT CHANGE UP (ref 1.6–2.6)
MCHC RBC-ENTMCNC: 22 PG — LOW (ref 27–34)
MCHC RBC-ENTMCNC: 29.6 GM/DL — LOW (ref 32–36)
MCV RBC AUTO: 74.3 FL — LOW (ref 80–100)
MONOCYTES # BLD AUTO: 0.5 K/UL — SIGNIFICANT CHANGE UP (ref 0–0.9)
MONOCYTES NFR BLD AUTO: 13.4 % — SIGNIFICANT CHANGE UP (ref 2–14)
NEUTROPHILS # BLD AUTO: 2.52 K/UL — SIGNIFICANT CHANGE UP (ref 1.8–7.4)
NEUTROPHILS NFR BLD AUTO: 67.6 % — SIGNIFICANT CHANGE UP (ref 43–77)
NITRITE UR-MCNC: NEGATIVE — SIGNIFICANT CHANGE UP
NRBC # BLD: 0 /100 WBCS — SIGNIFICANT CHANGE UP (ref 0–0)
PH UR: 6 — SIGNIFICANT CHANGE UP (ref 5–8)
PHOSPHATE SERPL-MCNC: 2.4 MG/DL — LOW (ref 2.5–4.5)
PLATELET # BLD AUTO: 163 K/UL — SIGNIFICANT CHANGE UP (ref 150–400)
POTASSIUM SERPL-MCNC: 3.9 MMOL/L — SIGNIFICANT CHANGE UP (ref 3.5–5.3)
POTASSIUM SERPL-SCNC: 3.9 MMOL/L — SIGNIFICANT CHANGE UP (ref 3.5–5.3)
PROT ?TM UR-MCNC: 20 MG/DL — HIGH (ref 0–12)
PROT SERPL-MCNC: 6.2 G/DL — SIGNIFICANT CHANGE UP (ref 6–8.3)
PROT UR-MCNC: ABNORMAL
PROT/CREAT UR-RTO: 0.1 RATIO — SIGNIFICANT CHANGE UP (ref 0–0.2)
RBC # BLD: 5.45 M/UL — SIGNIFICANT CHANGE UP (ref 4.2–5.8)
RBC # FLD: 16 % — HIGH (ref 10.3–14.5)
SODIUM SERPL-SCNC: 139 MMOL/L — SIGNIFICANT CHANGE UP (ref 135–145)
SP GR SPEC: 1.02 — SIGNIFICANT CHANGE UP (ref 1.01–1.02)
SPECIMEN SOURCE: SIGNIFICANT CHANGE UP
TACROLIMUS SERPL-MCNC: 10.7 NG/ML — SIGNIFICANT CHANGE UP
UROBILINOGEN FLD QL: NEGATIVE — SIGNIFICANT CHANGE UP
WBC # BLD: 3.73 K/UL — LOW (ref 3.8–10.5)
WBC # FLD AUTO: 3.73 K/UL — LOW (ref 3.8–10.5)

## 2022-03-08 PROCEDURE — 99223 1ST HOSP IP/OBS HIGH 75: CPT

## 2022-03-08 PROCEDURE — 99222 1ST HOSP IP/OBS MODERATE 55: CPT | Mod: GC

## 2022-03-08 RX ORDER — ACYCLOVIR SODIUM 500 MG
950 VIAL (EA) INTRAVENOUS EVERY 8 HOURS
Refills: 0 | Status: DISCONTINUED | OUTPATIENT
Start: 2022-03-08 | End: 2022-03-11

## 2022-03-08 RX ORDER — NIFEDIPINE 30 MG
60 TABLET, EXTENDED RELEASE 24 HR ORAL AT BEDTIME
Refills: 0 | Status: DISCONTINUED | OUTPATIENT
Start: 2022-03-08 | End: 2022-03-12

## 2022-03-08 RX ORDER — INSULIN GLARGINE 100 [IU]/ML
16 INJECTION, SOLUTION SUBCUTANEOUS AT BEDTIME
Refills: 0 | Status: DISCONTINUED | OUTPATIENT
Start: 2022-03-08 | End: 2022-03-11

## 2022-03-08 RX ORDER — SODIUM CHLORIDE 9 MG/ML
1000 INJECTION INTRAMUSCULAR; INTRAVENOUS; SUBCUTANEOUS
Refills: 0 | Status: DISCONTINUED | OUTPATIENT
Start: 2022-03-08 | End: 2022-03-11

## 2022-03-08 RX ADMIN — CARVEDILOL PHOSPHATE 12.5 MILLIGRAM(S): 80 CAPSULE, EXTENDED RELEASE ORAL at 17:22

## 2022-03-08 RX ADMIN — Medication 40 MILLIGRAM(S): at 06:05

## 2022-03-08 RX ADMIN — INSULIN GLARGINE 16 UNIT(S): 100 INJECTION, SOLUTION SUBCUTANEOUS at 21:39

## 2022-03-08 RX ADMIN — Medication 5 MILLIGRAM(S): at 06:03

## 2022-03-08 RX ADMIN — Medication 60 MILLIGRAM(S): at 21:39

## 2022-03-08 RX ADMIN — Medication 166 MILLIGRAM(S): at 12:34

## 2022-03-08 RX ADMIN — DEXTROSE MONOHYDRATE, SODIUM CHLORIDE, AND POTASSIUM CHLORIDE 75 MILLILITER(S): 50; .745; 4.5 INJECTION, SOLUTION INTRAVENOUS at 00:37

## 2022-03-08 RX ADMIN — Medication 2: at 08:20

## 2022-03-08 RX ADMIN — Medication 2: at 12:33

## 2022-03-08 RX ADMIN — Medication 166 MILLIGRAM(S): at 07:22

## 2022-03-08 RX ADMIN — Medication 2: at 17:22

## 2022-03-08 RX ADMIN — Medication 60 MILLIGRAM(S): at 06:03

## 2022-03-08 RX ADMIN — TACROLIMUS 4 MILLIGRAM(S): 5 CAPSULE ORAL at 09:56

## 2022-03-08 RX ADMIN — Medication 5 UNIT(S): at 12:34

## 2022-03-08 RX ADMIN — Medication 5 UNIT(S): at 08:21

## 2022-03-08 RX ADMIN — MYCOPHENOLATE MOFETIL 250 MILLIGRAM(S): 250 CAPSULE ORAL at 06:16

## 2022-03-08 RX ADMIN — HEPARIN SODIUM 5000 UNIT(S): 5000 INJECTION INTRAVENOUS; SUBCUTANEOUS at 06:02

## 2022-03-08 RX ADMIN — TACROLIMUS 4 MILLIGRAM(S): 5 CAPSULE ORAL at 21:39

## 2022-03-08 RX ADMIN — Medication 166 MILLIGRAM(S): at 01:29

## 2022-03-08 RX ADMIN — ATORVASTATIN CALCIUM 80 MILLIGRAM(S): 80 TABLET, FILM COATED ORAL at 21:39

## 2022-03-08 RX ADMIN — CARVEDILOL PHOSPHATE 12.5 MILLIGRAM(S): 80 CAPSULE, EXTENDED RELEASE ORAL at 06:14

## 2022-03-08 RX ADMIN — Medication 81 MILLIGRAM(S): at 12:33

## 2022-03-08 RX ADMIN — Medication 169 MILLIGRAM(S): at 21:37

## 2022-03-08 RX ADMIN — HEPARIN SODIUM 5000 UNIT(S): 5000 INJECTION INTRAVENOUS; SUBCUTANEOUS at 17:24

## 2022-03-08 RX ADMIN — SODIUM CHLORIDE 75 MILLILITER(S): 9 INJECTION INTRAMUSCULAR; INTRAVENOUS; SUBCUTANEOUS at 16:35

## 2022-03-08 RX ADMIN — Medication 5 UNIT(S): at 17:23

## 2022-03-08 RX ADMIN — TAMSULOSIN HYDROCHLORIDE 0.8 MILLIGRAM(S): 0.4 CAPSULE ORAL at 21:39

## 2022-03-08 RX ADMIN — TACROLIMUS 4 MILLIGRAM(S): 5 CAPSULE ORAL at 00:38

## 2022-03-08 NOTE — PROGRESS NOTE ADULT - ASSESSMENT
66M w/ ESRD s/p kidney transplant, DM2 on insulin, HTN p/w CP and left hand rash associated w/ pain admit for further evaluation of vesicular rash requiring evaluation/management for herpes zoster infection in immunocompetent host.    Problem/Plan - 1:  ·  Problem: Herpes zoster infection.   ·  Plan: - ID consult apprreciated  - c/w IV acyclovir - appears localized for now to hand & arm for now overlying Left C7 dermatome  - if develops worsening pain then should start gabapentin.  - ivf    Problem/Plan - 2:  ·  Problem: Chest pain.   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   ·  Plan: nephrology consult appreciated   c/w home dosing of cellcelpt, prograf and prednisone.    Problem/Plan - 4:  ·  Problem: Type 2 diabetes mellitus. ·  Plan: - monitor FS  - basal, bolus    Problem/Plan - 5:  ·  Problem: HTN (hypertension).   ·  Plan: -   - c/w carvedilol, furosemide, nifedipine.    Problem/Plan - 6:  ·  Problem: Microcytic anemia.   ·  Plan: monitor w/ cbc  no signs of active hemorrhage.    Problem/Plan - 7:  ·  Problem: Prophylactic measure.   ·  Plan: HSQ for dvt ppx.

## 2022-03-08 NOTE — CONSULT NOTE ADULT - SUBJECTIVE AND OBJECTIVE BOX
"HPI:  66M w/ ESRD s/p kidney transplant, DM2 on insulin, HTN p/w CP and left hand rash associated w/ pain. The patient reports experiencing intermittent CP for 1 week duration described as Left-sided near armpit, muscle cramp sensation, at times 7/10 severity, indicates similar sensation when he had port placed. Additionally, the patient also reports experiencing new onset left hand pain, swelling, and rash with pain moving up left arm. The pain is described a muscle cramp sensation similar to when the patient experienced a DVT and denies sensation of burning or sharpness. The rash appears as skin bumps. The patient denies fever, chills, palpitations, sob, cough, n/v/d, or painful urination. The patient denies history of shingles or herpes simplex virus. He is on immunosuppression medications for his kidney transplant. (07 Mar 2022 22:34)"    Above reviewed. 67 yo M ESRD s/p renal transplant >10 years ago, DDRT, initially with L sided hand rash. Patient with blister-like rash on L hand, tracking up arm in suspected dermatomal distribution. No fevers, no chills. No chest pain, no cough. No other new complaints. ID called for further eval ? shingles.    PAST MEDICAL & SURGICAL HISTORY:  Benign prostatic hypertrophy    Diabetic neuropathy associated with type 2 diabetes mellitus    Dyslipidemia    Hypertension    Obesity    Type 2 diabetes mellitus    ESRD (end stage renal disease)  s/p renal transplant    Bell&#x27;s palsy  2004    Legally blind  blind in right eye and limited vision in left eye    Diabetes    Kidney transplant status    S/P kidney transplant  2011    S/P TURP    History of detached retina repair  right eye    No significant past surgical history    Allergies    No Known Drug Allergies  Seafood (Pruritus; Rash)  shellfish (Hives)    ANTIMICROBIALS:  acyclovir IVPB    acyclovir IVPB 800 every 8 hours    OTHER MEDS:  aspirin enteric coated 81 milliGRAM(s) Oral daily  atorvastatin 80 milliGRAM(s) Oral at bedtime  carvedilol 12.5 milliGRAM(s) Oral every 12 hours  dextrose 40% Gel 15 Gram(s) Oral once  dextrose 5%. 1000 milliLiter(s) IV Continuous <Continuous>  dextrose 5%. 1000 milliLiter(s) IV Continuous <Continuous>  dextrose 50% Injectable 25 Gram(s) IV Push once  dextrose 50% Injectable 12.5 Gram(s) IV Push once  dextrose 50% Injectable 25 Gram(s) IV Push once  furosemide    Tablet 40 milliGRAM(s) Oral daily  glucagon  Injectable 1 milliGRAM(s) IntraMuscular once  heparin   Injectable 5000 Unit(s) SubCutaneous every 12 hours  insulin glargine Injectable (LANTUS) 16 Unit(s) SubCutaneous at bedtime  insulin lispro (ADMELOG) corrective regimen sliding scale   SubCutaneous three times a day before meals  insulin lispro (ADMELOG) corrective regimen sliding scale   SubCutaneous at bedtime  insulin lispro Injectable (ADMELOG) 5 Unit(s) SubCutaneous three times a day before meals  NIFEdipine XL 30 milliGRAM(s) Oral at bedtime  NIFEdipine XL 60 milliGRAM(s) Oral daily  predniSONE   Tablet 5 milliGRAM(s) Oral daily  tacrolimus 4 milliGRAM(s) Oral every 12 hours  tamsulosin 0.8 milliGRAM(s) Oral at bedtime    SOCIAL HISTORY: No tobacco, no alcohol, no illicit drugs    FAMILY HISTORY:  FH: kidney disease (Father, Mother)    Drug Dosing Weight  Height (cm): 188 (07 Mar 2022 12:53)  Weight (kg): 117.9 (07 Mar 2022 12:53)  BMI (kg/m2): 33.4 (07 Mar 2022 12:53)  BSA (m2): 2.43 (07 Mar 2022 12:53)    PE:    Vital Signs Last 24 Hrs  T(C): 97.9 (08 Mar 2022 12:19), Max: 97.9 (08 Mar 2022 12:19)  T(F): 208.2 (08 Mar 2022 12:19), Max: 208.2 (08 Mar 2022 12:19)  HR: 84 (08 Mar 2022 12:19) (59 - 84)  BP: 154/84 (08 Mar 2022 12:19) (147/71 - 182/91)  BP(mean): 121 (08 Mar 2022 05:38) (104 - 121)  RR: 17 (08 Mar 2022 12:19) (17 - 20)  SpO2: 95% (08 Mar 2022 12:19) (95% - 100%)    Gen: AOx3, NAD, non-toxic, pleasant  CV: Nontachycardic  Resp: Breathing comfortable, no SOB  Abd: Soft, nontender, +BS  Ext: L hand vesicle/blister like rash on hand, then sporadic along arm  : No Coker  IV/Skin: No thrombophlebitis, no rash  Msk: No low back pain, no arthralgias, no joint swelling  Neuro: No sensory deficits, no motor deficits    LABS:                        12.0   3.73  )-----------( 163      ( 08 Mar 2022 07:08 )             40.5     03-08    139  |  105  |  21  ----------------------------<  248<H>  3.9   |  23  |  1.60<H>    Ca    8.7      08 Mar 2022 07:08  Phos  2.4     03-08  Mg     1.7     03-08    TPro  6.2  /  Alb  4.0  /  TBili  0.4  /  DBili  x   /  AST  11  /  ALT  14  /  AlkPhos  68  03-08    MICROBIOLOGY:    COVID neg    RADIOLOGY:    No new available

## 2022-03-08 NOTE — CONSULT NOTE ADULT - PROBLEM SELECTOR RECOMMENDATION 9
history of DDRT in 2011 at [facility]; follows with Dr. Smith   baseline Creatinine is around 1.8-2mg/dL; renal function as baseline right now   home immunosuppression with tacrolimus 4mg BID, Cellcept 250mg BID, and prednisone 5mg daily   --- will hold Cellcept in the setting of his acute infection   continue with tacrolimus at current dose; monitor tacrolimus levels 30 minutes prior to AM dose   please attain UA and protein/creatinine ratio   please dose medications as per eGFR
Will increase Lantus to 16u at bedtime.  Will continue Admelog 5u before each meal and Admelog correction scale coverage. Will continue monitoring FS and FU.  Patient counseled for compliance with consistent low carb diet and exercise as tolerated outpatient.

## 2022-03-08 NOTE — CHART NOTE - NSCHARTNOTEFT_GEN_A_CORE
Patient IV fluids d/c but then restarted per ID recommendation. ok with transplant  team recommend normal saline @75. Procardia XL increased to 60 mg daily to better control blood pressure. Endocrine consult appreciated.

## 2022-03-08 NOTE — PROGRESS NOTE ADULT - SUBJECTIVE AND OBJECTIVE BOX
Patient is a 66y old  Male who presents with a chief complaint of 66M p/w CP and left hand rash associated w/ pain (08 Mar 2022 15:30)    Date of servie : 03-08-22 @ 16:09  INTERVAL HPI/OVERNIGHT EVENTS:  T(C): 37 (03-08-22 @ 15:54), Max: 97.9 (03-08-22 @ 12:19)  HR: 85 (03-08-22 @ 15:54) (59 - 85)  BP: 165/75 (03-08-22 @ 15:54) (147/71 - 182/91)  RR: 18 (03-08-22 @ 15:54) (17 - 20)  SpO2: 98% (03-08-22 @ 15:54) (95% - 100%)  Wt(kg): --  I&O's Summary    08 Mar 2022 07:01  -  08 Mar 2022 16:09  --------------------------------------------------------  IN: 840 mL / OUT: 800 mL / NET: 40 mL        LABS:                        12.0   3.73  )-----------( 163      ( 08 Mar 2022 07:08 )             40.5     03-08    139  |  105  |  21  ----------------------------<  248<H>  3.9   |  23  |  1.60<H>    Ca    8.7      08 Mar 2022 07:08  Phos  2.4     03-08  Mg     1.7     03-08    TPro  6.2  /  Alb  4.0  /  TBili  0.4  /  DBili  x   /  AST  11  /  ALT  14  /  AlkPhos  68  03-08        CAPILLARY BLOOD GLUCOSE      POCT Blood Glucose.: 224 mg/dL (08 Mar 2022 12:02)  POCT Blood Glucose.: 230 mg/dL (08 Mar 2022 07:56)  POCT Blood Glucose.: 294 mg/dL (07 Mar 2022 23:32)  POCT Blood Glucose.: 192 mg/dL (07 Mar 2022 19:29)            MEDICATIONS  (STANDING):  acyclovir IVPB 950 milliGRAM(s) IV Intermittent every 8 hours  aspirin enteric coated 81 milliGRAM(s) Oral daily  atorvastatin 80 milliGRAM(s) Oral at bedtime  carvedilol 12.5 milliGRAM(s) Oral every 12 hours  dextrose 40% Gel 15 Gram(s) Oral once  dextrose 5%. 1000 milliLiter(s) (100 mL/Hr) IV Continuous <Continuous>  dextrose 5%. 1000 milliLiter(s) (50 mL/Hr) IV Continuous <Continuous>  dextrose 50% Injectable 25 Gram(s) IV Push once  dextrose 50% Injectable 12.5 Gram(s) IV Push once  dextrose 50% Injectable 25 Gram(s) IV Push once  furosemide    Tablet 40 milliGRAM(s) Oral daily  glucagon  Injectable 1 milliGRAM(s) IntraMuscular once  heparin   Injectable 5000 Unit(s) SubCutaneous every 12 hours  insulin glargine Injectable (LANTUS) 16 Unit(s) SubCutaneous at bedtime  insulin lispro (ADMELOG) corrective regimen sliding scale   SubCutaneous three times a day before meals  insulin lispro (ADMELOG) corrective regimen sliding scale   SubCutaneous at bedtime  insulin lispro Injectable (ADMELOG) 5 Unit(s) SubCutaneous three times a day before meals  NIFEdipine XL 60 milliGRAM(s) Oral daily  NIFEdipine XL 60 milliGRAM(s) Oral at bedtime  predniSONE   Tablet 5 milliGRAM(s) Oral daily  tacrolimus 4 milliGRAM(s) Oral every 12 hours  tamsulosin 0.8 milliGRAM(s) Oral at bedtime    MEDICATIONS  (PRN):          PHYSICAL EXAM:  GENERAL: NAD, well-groomed, well-developed  HEAD:  Atraumatic, Normocephalic  CHEST/LUNG: Clear to percussion bilaterally; No rales, rhonchi, wheezing, or rubs  HEART: Regular rate and rhythm; No murmurs, rubs, or gallops  ABDOMEN: Soft, Nontender, Nondistended; Bowel sounds present  EXTREMITIES:  2+ Peripheral Pulses, No clubbing, cyanosis, or edema  LYMPH: No lymphadenopathy noted  SKIN: No rashes or lesions    Care Discussed with Consultants/Other Providers [ ] YES  [ ] NO

## 2022-03-08 NOTE — PATIENT PROFILE ADULT - FALL HARM RISK - HARM RISK INTERVENTIONS

## 2022-03-08 NOTE — CONSULT NOTE ADULT - ASSESSMENT
Patient is a 66 year old male with PMH of ESRD s/p DDRT in 2011 at [facility], Dm, HTN who presented to the hospital with one week of worsening left arm pain and left sided chest pain. he states he started to notice a left hand pain and blistering which progressively got worse and made its way up his arm to the left side of his chest.

## 2022-03-08 NOTE — CONSULT NOTE ADULT - ASSESSMENT
Speech Language Pathology  Facility/Department: [facility] ACUTE REHAB UNIT  Initial Speech/Language/Cognitive Assessment    NAME: Jaret Damon  : 1931   MRN: [id number]  ADMISSION DATE: 2019   Date of Eval: 2019   Evaluating Therapist: FORD Elliott    ADMITTING DIAGNOSIS:   59-year-old female with a history of GERD, and prior right femur fracture who was admitted on  after a fall with resultant left hip pain.  Imaging revealed an intertrochanteric femur fracture.  She was evaluated by Ortho and suggested to undergo an IMN which was performed on the same date with Dr. Glenroy Olivares procedure was uncomplicated. Yuliana Arrieta postoperative course has been unremarkable with the exception of anticipated blood loss anemia.  She reports her pain remains mildly uncontrolled especially with movement.  She was evaluated by therapy and suggested to continue in an inpatient setting prior to returning home. She reports her pain is improved today compared to yesterday. RECENT RESULTS  CT OF HEAD/MRI:  19 Head CT: No acute intracranial abnormality. Primary Complaint: Subjective: Per chart review, pt had an appointment scheduled for OP speech therapy to assess decline in memory. Per pt, she began getting lost while driving, forgetting doctor appointments, and got lost in a familiar grocery store. Date Onset: memory decline began approximately 1 year ago     Assessment:  Cognitive Diagnosis: Pt presents with mild to moderate cognitive linguistic deficits characterized by reduced moderately impaired short term memory resulting in reduced safety in environment (frequent falls, getting lost, forgetting life alert), ability to complete problem solving tasks (medication and finance management) and reduced thought organization (occasional word finding),         Recommendations:  Requires SLP Intervention: Yes  Duration/Frequency of Treatment: 30 minutes/day; 5 days/week  D/C Recommendations:  To be 65 yo M ESRD s/p renal transplant >10 years ago, DDRT, initially with L sided hand rash  Leukopenia, no fever  Blistering rash along C7 dermatome--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  Immunocompromised, although disease dose not seems disseminated, on evidence encephalopathy  Overall,  1) VZV/Shingles  - Appears single dermatome  - Acyclovir 800mg q 8 (based on IBW)  - Please have patient on IVF while on Acyclovir to offset crystal nephropathy  - Monitor Cr closely considering renal transplant  - Monitor rash  - Check VZV PCR swab  2) CKD  - Patient states baseline Cr ~1.9  - Monitor closely  - Immunosuppressives per primary team  3) Leukopenia  - Trend to normal    Buzz Shah MD  Contact on TEAMS messaging from 9am - 5pm  From 5pm-9am, and on weekends call [phone] 65 yo M ESRD s/p renal transplant >10 years ago, DDRT, initially with L sided hand rash  Leukopenia, no fever  Blistering rash along C7 dermatome--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

## 2022-03-08 NOTE — CONSULT NOTE ADULT - SUBJECTIVE AND OBJECTIVE BOX
[facility] DIVISION OF KIDNEY DISEASES AND HYPERTENSION -- INITIAL CONSULT NOTE  --------------------------------------------------------------------------------    HPI:  Patient is a 66 year old male with PMH of ESRD s/p DDRT in 2011 at [facility], Dm, HTN who presented to the hospital with one week of worsening left arm pain and left sided chest pain. he states he started to notice a left hand pain and blistering which progressively got worse and made its way up his arm to the left side of his chest. He described the pain as a burning and cramp like pain which got worse when he placed water on his hand. He denied any fevers, cough, of shortness of breath associated with it. He then called [facility] and was told to present to the ED. Upon arrival to the ED the patient was diagnosed with a herpes zoster infection and started on acyclovir for management. The transplant nephrology team was consulted for his history of renal transplant and immunosuppression management. The patient follow with Dr. Smith as his primary nephrologist and states his most recent SCr was 2.00mg/dL.         PAST HISTORY  --------------------------------------------------------------------------------  PAST MEDICAL & SURGICAL HISTORY:  Benign prostatic hypertrophy    Diabetic neuropathy associated with type 2 diabetes mellitus    Dyslipidemia    Hypertension    Obesity    Type 2 diabetes mellitus    ESRD (end stage renal disease)  s/p renal transplant    Bell&#x27;s palsy  2004    Legally blind  blind in right eye and limited vision in left eye    Diabetes    Kidney transplant status    S/P kidney transplant  2011    S/P TURP    History of detached retina repair  right eye    No significant past surgical history      FAMILY HISTORY:  FH: kidney disease (Father, Mother)      Social History:  denies tobacco use (07 Mar 2022 22:34)        ALLERGIES & MEDICATIONS  --------------------------------------------------------------------------------  Allergies    No Known Drug Allergies  Seafood (Pruritus; Rash)  shellfish (Hives)    Intolerances      Standing Inpatient Medications  acyclovir IVPB      acyclovir IVPB 800 milliGRAM(s) IV Intermittent every 8 hours  aspirin enteric coated 81 milliGRAM(s) Oral daily  atorvastatin 80 milliGRAM(s) Oral at bedtime  carvedilol 12.5 milliGRAM(s) Oral every 12 hours  dextrose 40% Gel 15 Gram(s) Oral once  dextrose 5%. 1000 milliLiter(s) IV Continuous <Continuous>  dextrose 5%. 1000 milliLiter(s) IV Continuous <Continuous>  dextrose 50% Injectable 25 Gram(s) IV Push once  dextrose 50% Injectable 12.5 Gram(s) IV Push once  dextrose 50% Injectable 25 Gram(s) IV Push once  furosemide    Tablet 40 milliGRAM(s) Oral daily  glucagon  Injectable 1 milliGRAM(s) IntraMuscular once  heparin   Injectable 5000 Unit(s) SubCutaneous every 12 hours  insulin glargine Injectable (LANTUS) 12 Unit(s) SubCutaneous at bedtime  insulin lispro (ADMELOG) corrective regimen sliding scale   SubCutaneous three times a day before meals  insulin lispro (ADMELOG) corrective regimen sliding scale   SubCutaneous at bedtime  insulin lispro Injectable (ADMELOG) 5 Unit(s) SubCutaneous three times a day before meals  NIFEdipine XL 30 milliGRAM(s) Oral at bedtime  NIFEdipine XL 60 milliGRAM(s) Oral daily  predniSONE   Tablet 5 milliGRAM(s) Oral daily  tacrolimus 4 milliGRAM(s) Oral every 12 hours  tamsulosin 0.8 milliGRAM(s) Oral at bedtime    PRN Inpatient Medications      REVIEW OF SYSTEMS  All negative except as mentioned above.       VITALS/PHYSICAL EXAM  --------------------------------------------------------------------------------  T(C): 97.9 (03-08-22 @ 12:19), Max: 97.9 (03-08-22 @ 12:19)  HR: 84 (03-08-22 @ 12:19) (59 - 84)  BP: 154/84 (03-08-22 @ 12:19) (147/71 - 182/91)  RR: 17 (03-08-22 @ 12:19) (17 - 20)  SpO2: 95% (03-08-22 @ 12:19) (95% - 100%)  Wt(kg): --  Height (cm): 188 (03-07-22 @ 12:53)  Weight (kg): 117.9 (03-07-22 @ 12:53)  BMI (kg/m2): 33.4 (03-07-22 @ 12:53)  BSA (m2): 2.43 (03-07-22 @ 12:53)      03-08-22 @ 07:01  -  03-08-22 @ 12:26  --------------------------------------------------------  IN: 600 mL / OUT: 800 mL / NET: -200 mL      Physical Exam:  	Gen: Not in distress, well-appearing  	HEENT: pupils reactive to light, no scleral icterus, moist oral mucosa. No thrush. Supple neck, no JVD  	Pulm: normal respiratory effort, lungs clear to auscultation bilaterally   	CV: regular rate and rhythm, S1 and S2 normal, no murmur   	Abd: normoactive bowel sounds, soft and non distended abdomen. No tenderness, guarding or rigidity                    Transplant non tender, no bruit  	: No suprapubic tenderness          Back: No spinal or CVA tenderness; no pre sacral edema          Extremities: No edema. Distal pulses 2+ bilaterally           Skin: small blisters on left dorsal hand  	Neuro: Alert and oriented to person, place and time. Normal speech. Normal affect.       LABS/STUDIES  --------------------------------------------------------------------------------              12.0   3.73  >-----------<  163      [03-08-22 @ 07:08]              40.5     139  |  105  |  21  ----------------------------<  248      [03-08-22 @ 07:08]  3.9   |  23  |  1.60        Ca     8.7     [03-08-22 @ 07:08]      Mg     1.7     [03-08-22 @ 07:08]      Phos  2.4     [03-08-22 @ 07:08]    TPro  6.2  /  Alb  4.0  /  TBili  0.4  /  DBili  x   /  AST  11  /  ALT  14  /  AlkPhos  68  [03-08-22 @ 07:08]          Creatinine Trend:  SCr 1.60 [03-08 @ 07:08]  SCr 1.84 [03-07 @ 15:45]    Urinalysis - [03-29-19 @ 12:16]      Color Yellow / Appearance Clear / SG 1.029 / pH 5.5      Gluc Negative / Ketone Small  / Bili Negative / Urobili Negative       Blood Negative / Protein 30 mg/dL / Leuk Est Negative / Nitrite Negative      RBC 2 / WBC 3 / Hyaline  / Gran  / Sq Epi  / Non Sq Epi 2 / Bacteria Negative      HbA1c 9.2      [03-09-19 @ 10:23]    HCV 0.06, Nonreact      [03-09-19 @ 14:44]      TacrolimusTacrolimus (), Serum: 10.7 ng/mL (03-08 @ 07:51)  Tacrolimus (), Serum: 13.6 ng/mL (03-07 @ 17:10)    Cyclosporine  Sirolimus  Mycophenolate  BK PCR  CMV PCR  Parvo PCR  EBV PCR

## 2022-03-08 NOTE — CONSULT NOTE ADULT - PROBLEM SELECTOR RECOMMENDATION 2
Suggest to continue medications, monitoring, FU primary team recommendations.
BP above goal right now   home regimen includes nifedipine 60mgAM and 30mg PM and carvedilol 12.5mg BID   can increase Nifedipine to 60mg BID   monitor BP     If any questions, please feel free to contact me     Maxim Hamm  Nephrology Fellow  [facility] Pager: [phone]

## 2022-03-08 NOTE — CONSULT NOTE ADULT - ASSESSMENT
Assessment  DMT2: 66y Male with DM T2 with hyperglycemia, A1C 8.1%, was on insulin at home, now on basal bolus insulin, first bolus dose this AM, blood sugars running high and not at target, no hypoglycemic episodes, eating meals, NAD, on prednisone.  Herpes Zoster: on medications, stable, monitored.  HTN: Controlled,  on antihypertensive medications.  ESRD: s/p kidney transplant, Monitor labs/BMP  Obesity: No strict exercise routines, not on any weight loss program, neither on low calorie diet.        Millie Altamirano MD  Cell: [phone]  Office: [phone]     Assessment  DMT2: 66y Male with DM T2 with hyperglycemia, A1C 8.1%, was on insulin at home, now on basal bolus insulin, first bolus dose this AM, blood sugars running high and not at target, no  hypoglycemic episodes, eating meals, NAD, on prednisone.  Herpes Zoster: on medications, stable, monitored.  HTN: Controlled,  on antihypertensive medications.  ESRD: s/p kidney transplant, Monitor labs/BMP  Obesity: No strict exercise routines, not on any weight loss program, neither on low calorie diet.        Millie Altamirano MD  Cell: [phone]  Office: [phone]

## 2022-03-08 NOTE — CONSULT NOTE ADULT - SUBJECTIVE AND OBJECTIVE BOX
HPI:  66M w/ ESRD s/p kidney transplant, DM2 on insulin, HTN p/w CP and left hand rash associated w/ pain. The patient reports experiencing intermittent CP for 1 week duration described as Left-sided near armpit, muscle cramp sensation, at times 7/10 severity, indicates similar sensation when he had port placed. Additionally, the patient also reports experiencing new onset left hand pain, swelling, and rash with pain moving up left arm. The pain is described a muscle cramp sensation similar to when the patient experienced a DVT and denies sensation of burning or sharpness. The rash appears as skin bumps. The patient denies fever, chills, palpitations, sob, cough, n/v/d, or painful urination. The patient denies history of shingles or herpes simplex virus. He is on immunosuppression medications for his kidney transplant. (07 Mar 2022 22:34)    Patient has history of diabetes, A1C 8.1%, on insulin at home, no recent hypoglycemic episodes, no polyuria polydipsia. Patient follows up with PCP for diabetes management.  Endo was consulted for glycemic control.    PAST MEDICAL & SURGICAL HISTORY:  Benign prostatic hypertrophy    Diabetic neuropathy associated with type 2 diabetes mellitus    Dyslipidemia    Hypertension    Obesity    Type 2 diabetes mellitus    ESRD (end stage renal disease)  s/p renal transplant    Bell&#x27;s palsy  2004    Legally blind  blind in right eye and limited vision in left eye    Diabetes    Kidney transplant status    S/P kidney transplant  2011    S/P TURP    History of detached retina repair  right eye    No significant past surgical history        FAMILY HISTORY:  FH: kidney disease (Father, Mother)        Social History:    Outpatient Medications:    MEDICATIONS  (STANDING):  acyclovir IVPB 950 milliGRAM(s) IV Intermittent every 8 hours  aspirin enteric coated 81 milliGRAM(s) Oral daily  atorvastatin 80 milliGRAM(s) Oral at bedtime  carvedilol 12.5 milliGRAM(s) Oral every 12 hours  dextrose 40% Gel 15 Gram(s) Oral once  dextrose 5%. 1000 milliLiter(s) (100 mL/Hr) IV Continuous <Continuous>  dextrose 5%. 1000 milliLiter(s) (50 mL/Hr) IV Continuous <Continuous>  dextrose 50% Injectable 25 Gram(s) IV Push once  dextrose 50% Injectable 12.5 Gram(s) IV Push once  dextrose 50% Injectable 25 Gram(s) IV Push once  furosemide    Tablet 40 milliGRAM(s) Oral daily  glucagon  Injectable 1 milliGRAM(s) IntraMuscular once  heparin   Injectable 5000 Unit(s) SubCutaneous every 12 hours  insulin glargine Injectable (LANTUS) 16 Unit(s) SubCutaneous at bedtime  insulin lispro (ADMELOG) corrective regimen sliding scale   SubCutaneous three times a day before meals  insulin lispro (ADMELOG) corrective regimen sliding scale   SubCutaneous at bedtime  insulin lispro Injectable (ADMELOG) 5 Unit(s) SubCutaneous three times a day before meals  NIFEdipine XL 30 milliGRAM(s) Oral at bedtime  NIFEdipine XL 60 milliGRAM(s) Oral daily  predniSONE   Tablet 5 milliGRAM(s) Oral daily  tacrolimus 4 milliGRAM(s) Oral every 12 hours  tamsulosin 0.8 milliGRAM(s) Oral at bedtime    MEDICATIONS  (PRN):      Allergies    No Known Drug Allergies  Seafood (Pruritus; Rash)  shellfish (Hives)    Intolerances      Review of Systems:  Constitutional: No fever, no chills  Eyes: No blurry vision  Neuro: No tremors  HEENT: No pain, no neck swelling  Cardiovascular: No chest pain, no palpitations  Respiratory: Has SOB, no cough  GI: No nausea, vomiting, abdominal pain  : No dysuria  Skin: no rash  MSK: Has leg swelling.  Psych: no depression  Endocrine: no polyuria, polydipsia    ALL OTHER SYSTEMS REVIEWED AND NEGATIVE    UNABLE TO OBTAIN    PHYSICAL EXAM:  VITALS: T(C): 97.9 (03-08-22 @ 12:19)  T(F): 208.2 (03-08-22 @ 12:19), Max: 208.2 (03-08-22 @ 12:19)  HR: 84 (03-08-22 @ 12:19) (59 - 84)  BP: 154/84 (03-08-22 @ 12:19) (147/71 - 182/91)  RR:  (17 - 20)  SpO2:  (95% - 100%)  Wt(kg): --  GENERAL: NAD, well-groomed, well-developed  EYES: No proptosis, no lid lag  HEENT:  Atraumatic, Normocephalic  THYROID: Normal size, no palpable nodules  RESPIRATORY: Clear to auscultation bilaterally; No rales, rhonchi, wheezing  CARDIOVASCULAR: Si S2, No murmurs;  GI: Soft, non distended, normal bowel sounds  SKIN: Dry, intact, No rashes or lesions  MUSCULOSKELETAL: Has BL lower extremity edema.  NEURO:  no tremor, sensation decreased in feet BL,    POCT Blood Glucose.: 224 mg/dL (03-08-22 @ 12:02)  POCT Blood Glucose.: 230 mg/dL (03-08-22 @ 07:56)  POCT Blood Glucose.: 294 mg/dL (03-07-22 @ 23:32)  POCT Blood Glucose.: 192 mg/dL (03-07-22 @ 19:29)                            12.0   3.73  )-----------( 163      ( 08 Mar 2022 07:08 )             40.5       03-08    139  |  105  |  21  ----------------------------<  248<H>  3.9   |  23  |  1.60<H>    EGFR if : x   EGFR if non : x     Ca    8.7      03-08  Mg     1.7     03-08  Phos  2.4     03-08    TPro  6.2  /  Alb  4.0  /  TBili  0.4  /  DBili  x   /  AST  11  /  ALT  14  /  AlkPhos  68  03-08      Thyroid Function Tests:              Radiology:                HPI:  66M w/ ESRD s/p kidney transplant, DM2 on insulin, HTN p/w CP and left hand rash associated w/ pain. The patient reports experiencing intermittent CP for 1 week duration described as Left-sided near armpit, muscle cramp sensation, at times 7/10 severity, indicates similar sensation when he had port placed. Additionally, the patient also reports experiencing new onset left hand pain, swelling, and rash with pain moving up left arm. The pain is described a muscle cramp sensation similar to when the patient experienced a DVT and denies sensation of burning or sharpness. The rash appears as skin bumps. The patient denies fever, chills, palpitations, sob, cough, n/v/d, or painful urination. The patient denies history of shingles or herpes simplex virus. He is on immunosuppression medications for his kidney transplant. (07 Mar 2022 22:34)    Patient has history of diabetes, A1C 8.1%, on insulin at home, no recent hypoglycemic episodes, no polyuria polydipsia. Patient follows up with PCP for diabetes management.  Endo was consulted for glycemic control.    PAST MEDICAL & SURGICAL HISTORY:  Benign prostatic hypertrophy    Diabetic neuropathy associated with type 2 diabetes mellitus    Dyslipidemia    Hypertension    Obesity    Type 2 diabetes mellitus    ESRD (end stage renal disease)  s/p renal transplant    Bell&#x27;s palsy  2004    Legally blind  blind in right eye and limited vision in left eye    Diabetes    Kidney transplant status    S/P kidney transplant  2011    S/P TURP    History of detached retina repair  right eye    No significant past surgical history        FAMILY HISTORY:  FH: kidney disease (Father, Mother)        Social History:    Outpatient Medications:    MEDICATIONS  (STANDING):  acyclovir IVPB 950 milliGRAM(s) IV Intermittent every 8 hours  aspirin enteric coated 81 milliGRAM(s) Oral daily  atorvastatin 80 milliGRAM(s) Oral at bedtime  carvedilol 12.5 milliGRAM(s) Oral every 12 hours  dextrose 40% Gel 15 Gram(s) Oral once  dextrose 5%. 1000 milliLiter(s) (100 mL/Hr) IV Continuous <Continuous>  dextrose 5%. 1000 milliLiter(s) (50 mL/Hr) IV Continuous <Continuous>  dextrose 50% Injectable 25 Gram(s) IV Push once  dextrose 50% Injectable 12.5 Gram(s) IV Push once  dextrose 50% Injectable 25 Gram(s) IV Push once  furosemide    Tablet 40 milliGRAM(s) Oral daily  glucagon  Injectable 1 milliGRAM(s) IntraMuscular once  heparin   Injectable 5000 Unit(s) SubCutaneous every 12 hours  insulin glargine Injectable (LANTUS) 16 Unit(s) SubCutaneous at bedtime  insulin lispro (ADMELOG) corrective regimen sliding scale   SubCutaneous three times a day before meals  insulin lispro (ADMELOG) corrective regimen sliding scale   SubCutaneous at bedtime  insulin lispro Injectable (ADMELOG) 5 Unit(s) SubCutaneous three times a day before meals  NIFEdipine XL 30 milliGRAM(s) Oral at bedtime  NIFEdipine XL 60 milliGRAM(s) Oral daily  predniSONE   Tablet 5 milliGRAM(s) Oral daily  tacrolimus 4 milliGRAM(s) Oral every 12 hours  tamsulosin 0.8 milliGRAM(s) Oral at bedtime    MEDICATIONS  (PRN):      Allergies    No Known Drug Allergies  Seafood (Pruritus; Rash)  shellfish (Hives)    Intolerances      Review of Systems:  Constitutional: No fever, no chills  Eyes: No blurry vision  Neuro: No tremors  HEENT: No pain, no neck swelling  Cardiovascular: No chest pain, no palpitations  Respiratory: Has SOB, no cough  GI: No nausea, vomiting, abdominal pain  : No dysuria  Skin: no rash  MSK: Has leg swelling.  Psych: no depression  Endocrine: no polyuria, polydipsia    ALL OTHER SYSTEMS REVIEWED AND NEGATIVE    UNABLE TO OBTAIN    PHYSICAL EXAM:  VITALS: T(C): 97.9 (03-08-22 @ 12:19)  T(F): 208.2 (03-08-22 @ 12:19), Max: 208.2 (03-08-22 @ 12:19)  HR: 84 (03-08-22 @ 12:19) (59 - 84)  BP: 154/84 (03-08-22 @ 12:19) (147/71 - 182/91)  RR:  (17 - 20)  SpO2:  (95% - 100%)  Wt(kg): --  GENERAL: NAD, well-groomed, well-developed  EYES: No proptosis, no lid lag  HEENT:  Atraumatic, Normocephalic  THYROID: Normal size, no palpable nodules  RESPIRATORY: Clear to auscultation bilaterally; No rales, rhonchi, wheezing  CARDIOVASCULAR: Si S2, No murmurs;  GI: Soft, non distended, normal bowel sounds  SKIN: Dry, intact, No rashes or lesions  MUSCULOSKELETAL: Has BL lower extremity edema.  NEURO:  no tremor, sensation decreased in feet BL,    POCT Blood Glucose.: 224 mg/dL (03-08-22 @ 12:02)  POCT Blood Glucose.: 230 mg/dL (03-08-22 @ 07:56)  POCT Blood Glucose.: 294 mg/dL (03-07-22 @ 23:32)  POCT Blood Glucose.: 192 mg/dL (03-07-22 @ 19:29)                            12.0   3.73  )-----------( 163      ( 08 Mar 2022 07:08 )             40.5       03-08    139  |  105  |  21  ----------------------------<  248<H>  3.9   |  23  |  1.60<H>    EGFR if : x   EGFR if non : x     Ca    8.7      03-08  Mg     1.7     03-08  Phos  2.4     03-08    TPro  6.2  /  Alb  4.0  /  TBili  0.4  /  DBili  x   /  AST  11  /  ALT  14  /  AlkPhos  68  03-08      Thyroid Function Tests:              Radiology:

## 2022-03-08 NOTE — CONSULT NOTE ADULT - ATTENDING COMMENTS
66 year old male with PMH of ESRD s/p DDRT in 2011 at [facility], Dm, HTN admitted with shingles.  Has blisters on left hand.  Creatinine stable, hold MMF in setting of infection.  Cont tacro and pred.   Check tacrolimus trough tomorrow.   Start IVF while on IV acyclovir.    Right AVF thrombosed - can use for IV access if needed.

## 2022-03-09 LAB
ANION GAP SERPL CALC-SCNC: 12 MMOL/L — SIGNIFICANT CHANGE UP (ref 5–17)
BUN SERPL-MCNC: 27 MG/DL — HIGH (ref 7–23)
CALCIUM SERPL-MCNC: 8.6 MG/DL — SIGNIFICANT CHANGE UP (ref 8.4–10.5)
CHLORIDE SERPL-SCNC: 104 MMOL/L — SIGNIFICANT CHANGE UP (ref 96–108)
CO2 SERPL-SCNC: 22 MMOL/L — SIGNIFICANT CHANGE UP (ref 22–31)
CREAT SERPL-MCNC: 1.83 MG/DL — HIGH (ref 0.5–1.3)
EGFR: 40 ML/MIN/1.73M2 — LOW
GLUCOSE BLDC GLUCOMTR-MCNC: 140 MG/DL — HIGH (ref 70–99)
GLUCOSE BLDC GLUCOMTR-MCNC: 155 MG/DL — HIGH (ref 70–99)
GLUCOSE BLDC GLUCOMTR-MCNC: 164 MG/DL — HIGH (ref 70–99)
GLUCOSE BLDC GLUCOMTR-MCNC: 250 MG/DL — HIGH (ref 70–99)
GLUCOSE SERPL-MCNC: 168 MG/DL — HIGH (ref 70–99)
HCT VFR BLD CALC: 38.8 % — LOW (ref 39–50)
HGB BLD-MCNC: 11.7 G/DL — LOW (ref 13–17)
MAGNESIUM SERPL-MCNC: 1.7 MG/DL — SIGNIFICANT CHANGE UP (ref 1.6–2.6)
MCHC RBC-ENTMCNC: 22 PG — LOW (ref 27–34)
MCHC RBC-ENTMCNC: 30.2 GM/DL — LOW (ref 32–36)
MCV RBC AUTO: 72.9 FL — LOW (ref 80–100)
NRBC # BLD: 0 /100 WBCS — SIGNIFICANT CHANGE UP (ref 0–0)
PHOSPHATE SERPL-MCNC: 2.7 MG/DL — SIGNIFICANT CHANGE UP (ref 2.5–4.5)
PLATELET # BLD AUTO: 168 K/UL — SIGNIFICANT CHANGE UP (ref 150–400)
POTASSIUM SERPL-MCNC: 4 MMOL/L — SIGNIFICANT CHANGE UP (ref 3.5–5.3)
POTASSIUM SERPL-SCNC: 4 MMOL/L — SIGNIFICANT CHANGE UP (ref 3.5–5.3)
RBC # BLD: 5.32 M/UL — SIGNIFICANT CHANGE UP (ref 4.2–5.8)
RBC # FLD: 15.9 % — HIGH (ref 10.3–14.5)
SODIUM SERPL-SCNC: 138 MMOL/L — SIGNIFICANT CHANGE UP (ref 135–145)
TACROLIMUS SERPL-MCNC: 6.6 NG/ML — SIGNIFICANT CHANGE UP
TSH SERPL-MCNC: 2.05 UIU/ML — SIGNIFICANT CHANGE UP (ref 0.27–4.2)
WBC # BLD: 3.74 K/UL — LOW (ref 3.8–10.5)
WBC # FLD AUTO: 3.74 K/UL — LOW (ref 3.8–10.5)

## 2022-03-09 PROCEDURE — 99232 SBSQ HOSP IP/OBS MODERATE 35: CPT

## 2022-03-09 RX ORDER — ACETAMINOPHEN 500 MG
650 TABLET ORAL ONCE
Refills: 0 | Status: COMPLETED | OUTPATIENT
Start: 2022-03-09 | End: 2022-03-09

## 2022-03-09 RX ORDER — INSULIN LISPRO 100/ML
6 VIAL (ML) SUBCUTANEOUS
Refills: 0 | Status: DISCONTINUED | OUTPATIENT
Start: 2022-03-09 | End: 2022-03-12

## 2022-03-09 RX ADMIN — HEPARIN SODIUM 5000 UNIT(S): 5000 INJECTION INTRAVENOUS; SUBCUTANEOUS at 05:48

## 2022-03-09 RX ADMIN — Medication 81 MILLIGRAM(S): at 12:34

## 2022-03-09 RX ADMIN — Medication 5 UNIT(S): at 08:28

## 2022-03-09 RX ADMIN — Medication 40 MILLIGRAM(S): at 05:49

## 2022-03-09 RX ADMIN — TAMSULOSIN HYDROCHLORIDE 0.8 MILLIGRAM(S): 0.4 CAPSULE ORAL at 22:11

## 2022-03-09 RX ADMIN — TACROLIMUS 4 MILLIGRAM(S): 5 CAPSULE ORAL at 05:48

## 2022-03-09 RX ADMIN — TACROLIMUS 4 MILLIGRAM(S): 5 CAPSULE ORAL at 17:23

## 2022-03-09 RX ADMIN — Medication 6 UNIT(S): at 17:24

## 2022-03-09 RX ADMIN — ATORVASTATIN CALCIUM 80 MILLIGRAM(S): 80 TABLET, FILM COATED ORAL at 22:11

## 2022-03-09 RX ADMIN — Medication 169 MILLIGRAM(S): at 13:21

## 2022-03-09 RX ADMIN — CARVEDILOL PHOSPHATE 12.5 MILLIGRAM(S): 80 CAPSULE, EXTENDED RELEASE ORAL at 05:49

## 2022-03-09 RX ADMIN — SODIUM CHLORIDE 75 MILLILITER(S): 9 INJECTION INTRAMUSCULAR; INTRAVENOUS; SUBCUTANEOUS at 22:19

## 2022-03-09 RX ADMIN — Medication 60 MILLIGRAM(S): at 22:11

## 2022-03-09 RX ADMIN — Medication 60 MILLIGRAM(S): at 05:48

## 2022-03-09 RX ADMIN — Medication 169 MILLIGRAM(S): at 21:54

## 2022-03-09 RX ADMIN — Medication 650 MILLIGRAM(S): at 06:53

## 2022-03-09 RX ADMIN — Medication 1: at 17:23

## 2022-03-09 RX ADMIN — Medication 5 UNIT(S): at 12:34

## 2022-03-09 RX ADMIN — Medication 2: at 12:34

## 2022-03-09 RX ADMIN — Medication 5 MILLIGRAM(S): at 05:48

## 2022-03-09 RX ADMIN — INSULIN GLARGINE 16 UNIT(S): 100 INJECTION, SOLUTION SUBCUTANEOUS at 22:12

## 2022-03-09 RX ADMIN — SODIUM CHLORIDE 75 MILLILITER(S): 9 INJECTION INTRAMUSCULAR; INTRAVENOUS; SUBCUTANEOUS at 06:53

## 2022-03-09 RX ADMIN — Medication 169 MILLIGRAM(S): at 05:47

## 2022-03-09 RX ADMIN — Medication 1: at 08:28

## 2022-03-09 RX ADMIN — HEPARIN SODIUM 5000 UNIT(S): 5000 INJECTION INTRAVENOUS; SUBCUTANEOUS at 17:23

## 2022-03-09 RX ADMIN — CARVEDILOL PHOSPHATE 12.5 MILLIGRAM(S): 80 CAPSULE, EXTENDED RELEASE ORAL at 17:23

## 2022-03-09 NOTE — PROGRESS NOTE ADULT - ASSESSMENT
Assessment  DMT2: 66y Male with DM T2 with hyperglycemia, A1C 8.1%, was on insulin at home, now on basal bolus insulin, increased dose yesterday, blood sugars improving though still running high postprandially, no hypoglycemic episodes, eating meals, NAD, remains on prednisone.  Herpes Zoster: on medications, stable, monitored.  HTN: Controlled,  on antihypertensive medications.  ESRD: s/p kidney transplant, Monitor labs/BMP  Obesity: No strict exercise routines, not on any weight loss program, neither on low calorie diet.        Millie Altamirano MD  Cell: [phone]  Office: [phone]     Assessment  DMT2: 66y Male with DM T2 with hyperglycemia, A1C 8.1%, was on insulin at home, now on basal bolus insulin, increased dose yesterday, blood sugars improving though still running high postprandially, no hypoglycemic episodes,  eating meals, NAD, remains on prednisone.  Herpes Zoster: on medications, stable, monitored.  HTN: Controlled,  on antihypertensive medications.  ESRD: s/p kidney transplant, Monitor labs/BMP  Obesity: No strict exercise routines, not on any weight loss program, neither on low calorie diet.        Millie Altamirano MD  Cell: [phone]  Office: [phone]

## 2022-03-09 NOTE — PROGRESS NOTE ADULT - ASSESSMENT
65 yo M ESRD s/p renal transplant >10 years ago, DDRT, initially with L sided hand rash  Leukopenia, no fever  Blistering rash along C7 dermatome  VZV PCR+  Immunocompromised, although disease dose not seems disseminated, on evidence encephalopathy  Overall,  1) VZV/Shingles  - Appears single dermatome  - Acyclovir 950mg q 8 (based on IBW)  - Please have patient on IVF while on Acyclovir to offset crystal nephropathy  - Monitor Cr closely considering renal transplant  - Monitor rash  2) CKD  - Patient states baseline Cr ~1.9  - Monitor closely  - Immunosuppressives per primary team  3) Leukopenia  - Trend to normal    Buzz Shah MD  Contact on TEAMS messaging from 9am - 5pm  From 5pm-9am, and on weekends call [phone]

## 2022-03-09 NOTE — PROGRESS NOTE ADULT - SUBJECTIVE AND OBJECTIVE BOX
Chief complaint  Patient is a 66y old  Male who presents with a chief complaint of 66M p/w CP and left hand rash associated w/ pain (09 Mar 2022 14:06)   Review of systems  Patient in bed, looks comfortable, no hypoglycemic episodes.    Labs and Fingersticks  CAPILLARY BLOOD GLUCOSE      POCT Blood Glucose.: 250 mg/dL (09 Mar 2022 12:06)  POCT Blood Glucose.: 155 mg/dL (09 Mar 2022 08:07)  POCT Blood Glucose.: 86 mg/dL (08 Mar 2022 21:25)  POCT Blood Glucose.: 241 mg/dL (08 Mar 2022 16:59)      Anion Gap, Serum: 12 (03-09 @ 11:18)  Anion Gap, Serum: 11 (03-08 @ 07:08)  Anion Gap, Serum: 10 (03-07 @ 15:45)      Calcium, Total Serum: 8.6 (03-09 @ 11:18)  Calcium, Total Serum: 8.7 (03-08 @ 07:08)  Calcium, Total Serum: 9.2 (03-07 @ 15:45)  Albumin, Serum: 4.0 (03-08 @ 07:08)  Albumin, Serum: 4.3 (03-07 @ 15:45)    Alanine Aminotransferase (ALT/SGPT): 14 (03-08 @ 07:08)  Alanine Aminotransferase (ALT/SGPT): 16 (03-07 @ 15:45)  Alkaline Phosphatase, Serum: 68 (03-08 @ 07:08)  Alkaline Phosphatase, Serum: 71 (03-07 @ 15:45)  Aspartate Aminotransferase (AST/SGOT): 11 (03-08 @ 07:08)  Aspartate Aminotransferase (AST/SGOT): 14 (03-07 @ 15:45)        03-09    138  |  104  |  27<H>  ----------------------------<  168<H>  4.0   |  22  |  1.83<H>    Ca    8.6      09 Mar 2022 11:18  Phos  2.7     03-09  Mg     1.7     03-09    TPro  6.2  /  Alb  4.0  /  TBili  0.4  /  DBili  x   /  AST  11  /  ALT  14  /  AlkPhos  68  03-08                        11.7   3.74  )-----------( 168      ( 09 Mar 2022 11:18 )             38.8     Medications  MEDICATIONS  (STANDING):  acyclovir IVPB 950 milliGRAM(s) IV Intermittent every 8 hours  aspirin enteric coated 81 milliGRAM(s) Oral daily  atorvastatin 80 milliGRAM(s) Oral at bedtime  carvedilol 12.5 milliGRAM(s) Oral every 12 hours  dextrose 40% Gel 15 Gram(s) Oral once  dextrose 5%. 1000 milliLiter(s) (100 mL/Hr) IV Continuous <Continuous>  dextrose 5%. 1000 milliLiter(s) (50 mL/Hr) IV Continuous <Continuous>  dextrose 50% Injectable 25 Gram(s) IV Push once  dextrose 50% Injectable 12.5 Gram(s) IV Push once  dextrose 50% Injectable 25 Gram(s) IV Push once  furosemide    Tablet 40 milliGRAM(s) Oral daily  glucagon  Injectable 1 milliGRAM(s) IntraMuscular once  heparin   Injectable 5000 Unit(s) SubCutaneous every 12 hours  insulin glargine Injectable (LANTUS) 16 Unit(s) SubCutaneous at bedtime  insulin lispro (ADMELOG) corrective regimen sliding scale   SubCutaneous three times a day before meals  insulin lispro (ADMELOG) corrective regimen sliding scale   SubCutaneous at bedtime  insulin lispro Injectable (ADMELOG) 6 Unit(s) SubCutaneous three times a day before meals  NIFEdipine XL 60 milliGRAM(s) Oral daily  NIFEdipine XL 60 milliGRAM(s) Oral at bedtime  predniSONE   Tablet 5 milliGRAM(s) Oral daily  sodium chloride 0.9%. 1000 milliLiter(s) (75 mL/Hr) IV Continuous <Continuous>  tacrolimus 4 milliGRAM(s) Oral every 12 hours  tamsulosin 0.8 milliGRAM(s) Oral at bedtime      Physical Exam  General: Patient comfortable in bed  Vital Signs Last 12 Hrs  T(F): 98.5 (03-09-22 @ 12:23), Max: 98.5 (03-09-22 @ 12:23)  HR: 75 (03-09-22 @ 12:23) (74 - 75)  BP: 167/93 (03-09-22 @ 12:23) (161/82 - 167/93)  BP(mean): 108 (03-09-22 @ 05:46) (108 - 108)  RR: 18 (03-09-22 @ 12:23) (18 - 18)  SpO2: 94% (03-09-22 @ 12:23) (94% - 96%)  Neck: No palpable thyroid nodules.  CVS: S1S2, No murmurs  Respiratory: No wheezing, no crepitations  GI: Abdomen soft, bowel sounds positive  Musculoskeletal:  edema lower extremities.   Skin: No skin rashes, no ecchymosis    Diagnostics    Free Thyroxine, Serum: AM Sched. Collection: 09-Mar-2022 06:00 (03-08 @ 12:40)  Thyroid Stimulating Hormone, Serum: AM Sched. Collection: 09-Mar-2022 06:00 (03-08 @ 12:40)         Chief complaint  Patient is a 66y old  Male who presents with a chief complaint of 66M p/w CP and left hand rash associated w/ pain (09 Mar 2022 14:06)   Review of systems  Patient in bed, looks comfortable, no hypoglycemic episodes.    Labs and Fingersticks  CAPILLARY BLOOD GLUCOSE      POCT Blood Glucose.: 250 mg/dL (09 Mar 2022 12:06)  POCT Blood Glucose.: 155 mg/dL (09 Mar 2022 08:07)  POCT Blood Glucose.: 86 mg/dL (08 Mar 2022 21:25)  POCT Blood Glucose.: 241 mg/dL (08 Mar 2022 16:59)      Anion Gap, Serum: 12 (03-09 @ 11:18)  Anion Gap, Serum: 11 (03-08 @ 07:08)  Anion Gap, Serum: 10 (03-07 @ 15:45)      Calcium, Total Serum: 8.6 (03-09 @ 11:18)  Calcium, Total Serum: 8.7 (03-08 @ 07:08)  Calcium, Total Serum: 9.2 (03-07 @ 15:45)  Albumin, Serum: 4.0 (03-08 @ 07:08)  Albumin, Serum: 4.3 (03-07 @ 15:45)    Alanine Aminotransferase (ALT/SGPT): 14 (03-08 @ 07:08)  Alanine Aminotransferase (ALT/SGPT): 16 (03-07 @ 15:45)  Alkaline Phosphatase, Serum: 68 (03-08 @ 07:08)  Alkaline Phosphatase, Serum: 71 (03-07 @ 15:45)  Aspartate Aminotransferase (AST/SGOT): 11 (03-08 @ 07:08)  Aspartate Aminotransferase (AST/SGOT): 14 (03-07 @ 15:45)        03-09    138  |  104  |  27<H>  ----------------------------<  168<H>  4.0   |  22  |  1.83<H>    Ca    8.6      09 Mar 2022 11:18  Phos  2.7     03-09  Mg     1.7     03-09    TPro  6.2  /  Alb  4.0  /  TBili  0.4  /  DBili  x   /  AST  11  /  ALT  14  /  AlkPhos  68  03-08                        11.7   3.74  )-----------( 168      ( 09 Mar 2022 11:18 )             38.8     Medications  MEDICATIONS  (STANDING):  acyclovir IVPB 950 milliGRAM(s) IV Intermittent every 8 hours  aspirin enteric coated 81 milliGRAM(s) Oral daily  atorvastatin 80 milliGRAM(s) Oral at bedtime  carvedilol 12.5 milliGRAM(s) Oral every 12 hours  dextrose 40% Gel 15 Gram(s) Oral once  dextrose 5%. 1000 milliLiter(s) (100 mL/Hr) IV Continuous <Continuous>  dextrose 5%. 1000 milliLiter(s) (50 mL/Hr) IV Continuous <Continuous>  dextrose 50% Injectable 25 Gram(s) IV Push once  dextrose 50% Injectable 12.5 Gram(s) IV Push once  dextrose 50% Injectable 25 Gram(s) IV Push once  furosemide    Tablet 40 milliGRAM(s) Oral daily  glucagon  Injectable 1 milliGRAM(s) IntraMuscular once  heparin   Injectable 5000 Unit(s) SubCutaneous every 12 hours  insulin glargine Injectable (LANTUS) 16 Unit(s) SubCutaneous at bedtime  insulin lispro (ADMELOG) corrective regimen sliding scale   SubCutaneous three times a day before meals  insulin lispro (ADMELOG) corrective regimen sliding scale   SubCutaneous at bedtime  insulin lispro Injectable (ADMELOG) 6 Unit(s) SubCutaneous three times a day before meals  NIFEdipine XL 60 milliGRAM(s) Oral daily  NIFEdipine XL 60 milliGRAM(s) Oral at bedtime  predniSONE   Tablet 5 milliGRAM(s) Oral daily  sodium chloride 0.9%. 1000 milliLiter(s) (75 mL/Hr) IV Continuous <Continuous>  tacrolimus 4 milliGRAM(s) Oral every 12 hours  tamsulosin 0.8 milliGRAM(s) Oral at bedtime      Physical Exam  General: Patient comfortable in bed  Vital Signs Last 12 Hrs  T(F): 98.5 (03-09-22 @ 12:23), Max: 98.5 (03-09-22 @ 12:23)  HR: 75 (03-09-22 @ 12:23) (74 - 75)  BP: 167/93 (03-09-22 @ 12:23) (161/82 - 167/93)  BP(mean): 108 (03-09-22 @ 05:46) (108 - 108)  RR: 18 (03-09-22 @ 12:23) (18 - 18)  SpO2: 94% (03-09-22 @ 12:23) (94% - 96%)  Neck: No palpable thyroid nodules.  CVS: S1S2, No murmurs  Respiratory: No wheezing, no crepitations  GI: Abdomen soft, bowel sounds positive  Musculoskeletal:  edema lower extremities.   Skin: No skin rashes, no ecchymosis    Diagnostics    Free Thyroxine, Serum: AM Sched. Collection: 09-Mar-2022 06:00 (03-08 @ 12:40)  Thyroid Stimulating Hormone, Serum: AM Sched. Collection: 09-Mar-2022 06:00 (03-08 @ 12:40)

## 2022-03-09 NOTE — CONSULT NOTE ADULT - REASON FOR ADMISSION
66M p/w CP and left hand rash associated w/ pain

## 2022-03-09 NOTE — PROGRESS NOTE ADULT - SUBJECTIVE AND OBJECTIVE BOX
CC: F/U for VZV    Saw/spoke to patient. Doing well. No new complaints.    Allergies  No Known Drug Allergies  Seafood (Pruritus; Rash)  shellfish (Hives)    ANTIMICROBIALS:  acyclovir IVPB 950 every 8 hours    PE:    Vital Signs Last 24 Hrs  T(C): 36.9 (09 Mar 2022 12:23), Max: 37 (08 Mar 2022 15:54)  T(F): 98.5 (09 Mar 2022 12:23), Max: 98.6 (08 Mar 2022 15:54)  HR: 75 (09 Mar 2022 12:23) (73 - 85)  BP: 167/93 (09 Mar 2022 12:23) (147/85 - 167/93)  BP(mean): 108 (09 Mar 2022 05:46) (108 - 108)  RR: 18 (09 Mar 2022 12:23) (18 - 18)  SpO2: 94% (09 Mar 2022 12:23) (94% - 98%)    Gen: AOx3, NAD, non-toxic  Ext: LUE areas of vesicular lesions, some areas starting to flatten    LABS:                        11.7   3.74  )-----------( 168      ( 09 Mar 2022 11:18 )             38.8     03-09    138  |  104  |  27<H>  ----------------------------<  168<H>  4.0   |  22  |  1.83<H>    Ca    8.6      09 Mar 2022 11:18  Phos  2.7     03-09  Mg     1.7     03-09    TPro  6.2  /  Alb  4.0  /  TBili  0.4  /  DBili  x   /  AST  11  /  ALT  14  /  AlkPhos  68  03-08    Urinalysis Basic - ( 08 Mar 2022 20:23 )    Color: Light Yellow / Appearance: Clear / S.023 / pH: x  Gluc: x / Ketone: Negative  / Bili: Negative / Urobili: Negative   Blood: x / Protein: Trace / Nitrite: Negative   Leuk Esterase: Negative / RBC: 1 /hpf / WBC 0 /HPF   Sq Epi: x / Non Sq Epi: 0 /hpf / Bacteria: Negative    MICROBIOLOGY:    VZV PCR+    RADIOLOGY:    3/7 XR:    FINDINGS:  The heart size is normal.  The lungs are clear. Elevated left hemidiaphragm.  There is no pneumothorax or pleural effusion.    IMPRESSION:  Clear lungs.

## 2022-03-09 NOTE — PROGRESS NOTE ADULT - SUBJECTIVE AND OBJECTIVE BOX
Patient is a 66y old  Male who presents with a chief complaint of 66M p/w CP and left hand rash associated w/ pain (09 Mar 2022 07:09)    Date of servie : 22 @ 14:06  INTERVAL HPI/OVERNIGHT EVENTS:  T(C): 36.9 (22 @ 12:23), Max: 37 (22 @ 15:54)  HR: 75 (22 @ 12:23) (73 - 85)  BP: 167/93 (22 @ 12:23) (147/85 - 167/93)  RR: 18 (22 @ 12:23) (18 - 18)  SpO2: 94% (22 @ 12:23) (94% - 98%)  Wt(kg): --  I&O's Summary    08 Mar 2022 07:01  -  09 Mar 2022 07:00  --------------------------------------------------------  IN: 1405 mL / OUT: 800 mL / NET: 605 mL    09 Mar 2022 07:01  -  09 Mar 2022 14:06  --------------------------------------------------------  IN: 480 mL / OUT: 0 mL / NET: 480 mL        LABS:                        11.7   3.74  )-----------( 168      ( 09 Mar 2022 11:18 )             38.8         138  |  104  |  27<H>  ----------------------------<  168<H>  4.0   |  22  |  1.83<H>    Ca    8.6      09 Mar 2022 11:18  Phos  2.7     03  Mg     1.7         TPro  6.2  /  Alb  4.0  /  TBili  0.4  /  DBili  x   /  AST  11  /  ALT  14  /  AlkPhos  68  08      Urinalysis Basic - ( 08 Mar 2022 20:23 )    Color: Light Yellow / Appearance: Clear / S.023 / pH: x  Gluc: x / Ketone: Negative  / Bili: Negative / Urobili: Negative   Blood: x / Protein: Trace / Nitrite: Negative   Leuk Esterase: Negative / RBC: 1 /hpf / WBC 0 /HPF   Sq Epi: x / Non Sq Epi: 0 /hpf / Bacteria: Negative      CAPILLARY BLOOD GLUCOSE      POCT Blood Glucose.: 250 mg/dL (09 Mar 2022 12:06)  POCT Blood Glucose.: 155 mg/dL (09 Mar 2022 08:07)  POCT Blood Glucose.: 86 mg/dL (08 Mar 2022 21:25)  POCT Blood Glucose.: 241 mg/dL (08 Mar 2022 16:59)        Urinalysis Basic - ( 08 Mar 2022 20:23 )    Color: Light Yellow / Appearance: Clear / S.023 / pH: x  Gluc: x / Ketone: Negative  / Bili: Negative / Urobili: Negative   Blood: x / Protein: Trace / Nitrite: Negative   Leuk Esterase: Negative / RBC: 1 /hpf / WBC 0 /HPF   Sq Epi: x / Non Sq Epi: 0 /hpf / Bacteria: Negative        MEDICATIONS  (STANDING):  acyclovir IVPB 950 milliGRAM(s) IV Intermittent every 8 hours  aspirin enteric coated 81 milliGRAM(s) Oral daily  atorvastatin 80 milliGRAM(s) Oral at bedtime  carvedilol 12.5 milliGRAM(s) Oral every 12 hours  dextrose 40% Gel 15 Gram(s) Oral once  dextrose 5%. 1000 milliLiter(s) (100 mL/Hr) IV Continuous <Continuous>  dextrose 5%. 1000 milliLiter(s) (50 mL/Hr) IV Continuous <Continuous>  dextrose 50% Injectable 25 Gram(s) IV Push once  dextrose 50% Injectable 12.5 Gram(s) IV Push once  dextrose 50% Injectable 25 Gram(s) IV Push once  furosemide    Tablet 40 milliGRAM(s) Oral daily  glucagon  Injectable 1 milliGRAM(s) IntraMuscular once  heparin   Injectable 5000 Unit(s) SubCutaneous every 12 hours  insulin glargine Injectable (LANTUS) 16 Unit(s) SubCutaneous at bedtime  insulin lispro (ADMELOG) corrective regimen sliding scale   SubCutaneous three times a day before meals  insulin lispro (ADMELOG) corrective regimen sliding scale   SubCutaneous at bedtime  insulin lispro Injectable (ADMELOG) 6 Unit(s) SubCutaneous three times a day before meals  NIFEdipine XL 60 milliGRAM(s) Oral daily  NIFEdipine XL 60 milliGRAM(s) Oral at bedtime  predniSONE   Tablet 5 milliGRAM(s) Oral daily  sodium chloride 0.9%. 1000 milliLiter(s) (75 mL/Hr) IV Continuous <Continuous>  tacrolimus 4 milliGRAM(s) Oral every 12 hours  tamsulosin 0.8 milliGRAM(s) Oral at bedtime    MEDICATIONS  (PRN):          PHYSICAL EXAM:  GENERAL: NAD, well-groomed, well-developed  HEAD:  Atraumatic, Normocephalic  CHEST/LUNG: Clear to percussion bilaterally; No rales, rhonchi, wheezing, or rubs  HEART: Regular rate and rhythm; No murmurs, rubs, or gallops  ABDOMEN: Soft, Nontender, Nondistended; Bowel sounds present  EXTREMITIES:  2+ Peripheral Pulses, No clubbing, cyanosis, or edema  LYMPH: No lymphadenopathy noted  SKIN: No rashes or lesions    Care Discussed with Consultants/Other Providers [x ] YES  [ ] NO

## 2022-03-09 NOTE — CONSULT NOTE ADULT - SUBJECTIVE AND OBJECTIVE BOX
CHIEF COMPLAINT:Patient is a 66y old  Male who presents with a chief complaint of 66M p/w CP and left hand rash associated w/ pain (08 Mar 2022 16:09)      HISTORY OF PRESENT ILLNESS:    66 male with history HTN, HLD, DMII, sp kidney transplant admitted with CP , left arm pain   discovered to have zoster infection   no sob  no dizziness  no syncope     PAST MEDICAL & SURGICAL HISTORY:  Benign prostatic hypertrophy    Diabetic neuropathy associated with type 2 diabetes mellitus    Dyslipidemia    Hypertension    Obesity    Type 2 diabetes mellitus    ESRD (end stage renal disease)  s/p renal transplant    Bell&#x27;s palsy  2004    Legally blind  blind in right eye and limited vision in left eye    Diabetes    Kidney transplant status    S/P kidney transplant  2011    S/P TURP    History of detached retina repair  right eye    No significant past surgical history            MEDICATIONS:  aspirin enteric coated 81 milliGRAM(s) Oral daily  carvedilol 12.5 milliGRAM(s) Oral every 12 hours  furosemide    Tablet 40 milliGRAM(s) Oral daily  heparin   Injectable 5000 Unit(s) SubCutaneous every 12 hours  NIFEdipine XL 60 milliGRAM(s) Oral daily  NIFEdipine XL 60 milliGRAM(s) Oral at bedtime  tamsulosin 0.8 milliGRAM(s) Oral at bedtime    acyclovir IVPB 950 milliGRAM(s) IV Intermittent every 8 hours          atorvastatin 80 milliGRAM(s) Oral at bedtime  dextrose 40% Gel 15 Gram(s) Oral once  dextrose 50% Injectable 25 Gram(s) IV Push once  dextrose 50% Injectable 12.5 Gram(s) IV Push once  dextrose 50% Injectable 25 Gram(s) IV Push once  glucagon  Injectable 1 milliGRAM(s) IntraMuscular once  insulin glargine Injectable (LANTUS) 16 Unit(s) SubCutaneous at bedtime  insulin lispro (ADMELOG) corrective regimen sliding scale   SubCutaneous three times a day before meals  insulin lispro (ADMELOG) corrective regimen sliding scale   SubCutaneous at bedtime  insulin lispro Injectable (ADMELOG) 5 Unit(s) SubCutaneous three times a day before meals  predniSONE   Tablet 5 milliGRAM(s) Oral daily    dextrose 5%. 1000 milliLiter(s) IV Continuous <Continuous>  dextrose 5%. 1000 milliLiter(s) IV Continuous <Continuous>  sodium chloride 0.9%. 1000 milliLiter(s) IV Continuous <Continuous>  tacrolimus 4 milliGRAM(s) Oral every 12 hours      FAMILY HISTORY:  FH: kidney disease (Father, Mother)        Non-contributory    SOCIAL HISTORY:    No tobacco, drugs or etoh    Allergies    No Known Drug Allergies  Seafood (Pruritus; Rash)  shellfish (Hives)    Intolerances    	    REVIEW OF SYSTEMS:  as above  The rest of the 14 points ROS reviewed and except above they are unremarkable.        PHYSICAL EXAM:  T(C): 36.9 (03-09-22 @ 05:46), Max: 97.9 (03-08-22 @ 12:19)  HR: 74 (03-09-22 @ 05:46) (73 - 85)  BP: 161/82 (03-09-22 @ 05:46) (147/85 - 170/90)  RR: 18 (03-09-22 @ 05:46) (17 - 18)  SpO2: 96% (03-09-22 @ 05:46) (95% - 98%)  Wt(kg): --  I&O's Summary    08 Mar 2022 07:01  -  09 Mar 2022 07:00  --------------------------------------------------------  IN: 1405 mL / OUT: 800 mL / NET: 605 mL      JVP: Normal  Neck: supple  Lung: clear   CV: S1 S2 , Murmur:  Abd: soft  Ext: No edema  neuro: Awake / alert  Psych: flat affect  Skin: normal      LABS/DATA:    TELEMETRY: 	    ECG:  	   	  CARDIAC MARKERS:                        27 <<== 03-07-22 @ 17:16                29 <<== 03-07-22 @ 15:45                              12.0   3.73  )-----------( 163      ( 08 Mar 2022 07:08 )             40.5     03-08    139  |  105  |  21  ----------------------------<  248<H>  3.9   |  23  |  1.60<H>    Ca    8.7      08 Mar 2022 07:08  Phos  2.4     03-08  Mg     1.7     03-08    TPro  6.2  /  Alb  4.0  /  TBili  0.4  /  DBili  x   /  AST  11  /  ALT  14  /  AlkPhos  68  03-08    proBNP:   Lipid Profile:   HgA1c:   TSH:

## 2022-03-09 NOTE — CONSULT NOTE ADULT - ASSESSMENT
chest pain   atypical   due to zoster infection   EKG unremarkable     HTN  cont current meds    s/p Kidney transplant   fu with renal     DM II  Monitor finger stick. Insulin coverage. Diabetic education and Diabetic diet. Consider nutrition consultation.

## 2022-03-10 LAB
GLUCOSE BLDC GLUCOMTR-MCNC: 144 MG/DL — HIGH (ref 70–99)
GLUCOSE BLDC GLUCOMTR-MCNC: 203 MG/DL — HIGH (ref 70–99)
GLUCOSE BLDC GLUCOMTR-MCNC: 95 MG/DL — SIGNIFICANT CHANGE UP (ref 70–99)
GLUCOSE BLDC GLUCOMTR-MCNC: 98 MG/DL — SIGNIFICANT CHANGE UP (ref 70–99)
MYCOPHENOLATE SERPL-MCNC: 3.4 UG/ML — SIGNIFICANT CHANGE UP (ref 1–3.5)
MYCOPHENOLIC ACID GLUCURONIDE: 20 UG/ML — SIGNIFICANT CHANGE UP (ref 15–125)
TACROLIMUS SERPL-MCNC: 6.1 NG/ML — SIGNIFICANT CHANGE UP

## 2022-03-10 PROCEDURE — 99232 SBSQ HOSP IP/OBS MODERATE 35: CPT

## 2022-03-10 RX ORDER — ACETAMINOPHEN 500 MG
650 TABLET ORAL EVERY 6 HOURS
Refills: 0 | Status: DISCONTINUED | OUTPATIENT
Start: 2022-03-10 | End: 2022-03-12

## 2022-03-10 RX ADMIN — ATORVASTATIN CALCIUM 80 MILLIGRAM(S): 80 TABLET, FILM COATED ORAL at 22:39

## 2022-03-10 RX ADMIN — HEPARIN SODIUM 5000 UNIT(S): 5000 INJECTION INTRAVENOUS; SUBCUTANEOUS at 06:00

## 2022-03-10 RX ADMIN — Medication 60 MILLIGRAM(S): at 06:01

## 2022-03-10 RX ADMIN — Medication 5 MILLIGRAM(S): at 06:00

## 2022-03-10 RX ADMIN — Medication 169 MILLIGRAM(S): at 05:59

## 2022-03-10 RX ADMIN — CARVEDILOL PHOSPHATE 12.5 MILLIGRAM(S): 80 CAPSULE, EXTENDED RELEASE ORAL at 17:36

## 2022-03-10 RX ADMIN — Medication 6 UNIT(S): at 17:37

## 2022-03-10 RX ADMIN — Medication 40 MILLIGRAM(S): at 06:02

## 2022-03-10 RX ADMIN — TACROLIMUS 4 MILLIGRAM(S): 5 CAPSULE ORAL at 06:02

## 2022-03-10 RX ADMIN — Medication 650 MILLIGRAM(S): at 07:05

## 2022-03-10 RX ADMIN — HEPARIN SODIUM 5000 UNIT(S): 5000 INJECTION INTRAVENOUS; SUBCUTANEOUS at 17:35

## 2022-03-10 RX ADMIN — TACROLIMUS 4 MILLIGRAM(S): 5 CAPSULE ORAL at 17:35

## 2022-03-10 RX ADMIN — Medication 6 UNIT(S): at 08:55

## 2022-03-10 RX ADMIN — INSULIN GLARGINE 16 UNIT(S): 100 INJECTION, SOLUTION SUBCUTANEOUS at 22:35

## 2022-03-10 RX ADMIN — Medication 81 MILLIGRAM(S): at 12:44

## 2022-03-10 RX ADMIN — Medication 169 MILLIGRAM(S): at 22:35

## 2022-03-10 RX ADMIN — TAMSULOSIN HYDROCHLORIDE 0.8 MILLIGRAM(S): 0.4 CAPSULE ORAL at 22:37

## 2022-03-10 RX ADMIN — CARVEDILOL PHOSPHATE 12.5 MILLIGRAM(S): 80 CAPSULE, EXTENDED RELEASE ORAL at 06:01

## 2022-03-10 RX ADMIN — Medication 169 MILLIGRAM(S): at 14:09

## 2022-03-10 RX ADMIN — Medication 60 MILLIGRAM(S): at 22:37

## 2022-03-10 RX ADMIN — Medication 6 UNIT(S): at 12:44

## 2022-03-10 RX ADMIN — Medication 2: at 12:44

## 2022-03-10 RX ADMIN — Medication 650 MILLIGRAM(S): at 07:45

## 2022-03-10 NOTE — DIETITIAN INITIAL EVALUATION ADULT. - ADD RECOMMEND
1) Will continue to monitor PO intake, weight, labs, skin, GI status and diet 2) Monitor potassium levels while on tacro

## 2022-03-10 NOTE — DIETITIAN INITIAL EVALUATION ADULT. - ORAL INTAKE PTA/DIET HISTORY
visited pt at bedside this morning. eating well PTA. checks blood glucose daily, on insulin PTA. per outpatient medications, also on vitamin D. confirms allergy to shellfish, gets a rash. tries to monitor carbohydrate intake, but acknowledges he needs to do a better job.

## 2022-03-10 NOTE — PROGRESS NOTE ADULT - SUBJECTIVE AND OBJECTIVE BOX
DATE OF SERVICE: 03-10-22 @ 09:49    Subjective: Patient seen and examined. No new events except as noted.     SUBJECTIVE/ROS:        MEDICATIONS:  MEDICATIONS  (STANDING):  acyclovir IVPB 950 milliGRAM(s) IV Intermittent every 8 hours  aspirin enteric coated 81 milliGRAM(s) Oral daily  atorvastatin 80 milliGRAM(s) Oral at bedtime  carvedilol 12.5 milliGRAM(s) Oral every 12 hours  dextrose 40% Gel 15 Gram(s) Oral once  dextrose 5%. 1000 milliLiter(s) (100 mL/Hr) IV Continuous <Continuous>  dextrose 5%. 1000 milliLiter(s) (50 mL/Hr) IV Continuous <Continuous>  dextrose 50% Injectable 25 Gram(s) IV Push once  dextrose 50% Injectable 12.5 Gram(s) IV Push once  dextrose 50% Injectable 25 Gram(s) IV Push once  furosemide    Tablet 40 milliGRAM(s) Oral daily  glucagon  Injectable 1 milliGRAM(s) IntraMuscular once  heparin   Injectable 5000 Unit(s) SubCutaneous every 12 hours  insulin glargine Injectable (LANTUS) 16 Unit(s) SubCutaneous at bedtime  insulin lispro (ADMELOG) corrective regimen sliding scale   SubCutaneous three times a day before meals  insulin lispro (ADMELOG) corrective regimen sliding scale   SubCutaneous at bedtime  insulin lispro Injectable (ADMELOG) 6 Unit(s) SubCutaneous three times a day before meals  NIFEdipine XL 60 milliGRAM(s) Oral daily  NIFEdipine XL 60 milliGRAM(s) Oral at bedtime  predniSONE   Tablet 5 milliGRAM(s) Oral daily  sodium chloride 0.9%. 1000 milliLiter(s) (75 mL/Hr) IV Continuous <Continuous>  tacrolimus 4 milliGRAM(s) Oral every 12 hours  tamsulosin 0.8 milliGRAM(s) Oral at bedtime      PHYSICAL EXAM:  T(C): 36.6 (03-10-22 @ 08:16), Max: 37.1 (03-09-22 @ 19:30)  HR: 70 (03-10-22 @ 08:16) (70 - 85)  BP: 144/83 (03-10-22 @ 08:16) (144/83 - 167/93)  RR: 18 (03-10-22 @ 08:16) (18 - 18)  SpO2: 95% (03-10-22 @ 08:16) (94% - 96%)  Wt(kg): --  I&O's Summary    09 Mar 2022 07:01  -  10 Mar 2022 07:00  --------------------------------------------------------  IN: 3070 mL / OUT: 0 mL / NET: 3070 mL            JVP: Normal  Neck: supple  Lung: clear   CV: S1 S2 , Murmur:  Abd: soft  Ext: No edema  neuro: Awake  Psych: flat affect  Skin: normal``    LABS/DATA:    CARDIAC MARKERS:                                11.7   3.74  )-----------( 168      ( 09 Mar 2022 11:18 )             38.8     03-09    138  |  104  |  27<H>  ----------------------------<  168<H>  4.0   |  22  |  1.83<H>    Ca    8.6      09 Mar 2022 11:18  Phos  2.7     03-09  Mg     1.7     03-09      proBNP:   Lipid Profile:   HgA1c:   TSH: Thyroid Stimulating Hormone, Serum: 2.05 uIU/mL (03-09 @ 15:24)      TELE:  EKG:

## 2022-03-10 NOTE — DIETITIAN INITIAL EVALUATION ADULT. - PERSON TAUGHT/METHOD
Educated patient on a diabetic diet. discussed examples of carbohydrates, monitoring at meals. also discussed importance of pairing carbohydrates with protein sources to minimize blood glucose spikes. encouraged to continue monitor blood glucose levels./verbal instruction/patient instructed/teach back - (Patient repeats in own words)

## 2022-03-10 NOTE — DIETITIAN INITIAL EVALUATION ADULT. - PERTINENT LABORATORY DATA
03-09 Na 138 mmol/L Glu 168 mg/dL<H> K+ 4.0 mmol/L Cr  1.83 mg/dL<H> BUN 27 mg/dL<H> Phos 2.7 mg/dL Alb n/a   PAB n/a   Hgb 11.7 g/dL<L> Hct 38.8 %<L>  A1C with Estimated Average Glucose Result: 8.1 % (03-08-22 @ 09:03)  POCT Blood Glucose.: 144 mg/dL (03-10-22 @ 08:16)  POCT Blood Glucose.: 140 mg/dL (03-09-22 @ 22:04)  POCT Blood Glucose.: 164 mg/dL (03-09-22 @ 17:04)  POCT Blood Glucose.: 250 mg/dL (03-09-22 @ 12:06)

## 2022-03-10 NOTE — PROGRESS NOTE ADULT - ASSESSMENT
65 yo M ESRD s/p renal transplant >10 years ago, DDRT, initially with L sided hand rash  Leukopenia, no fever  Blistering rash along C7 dermatome  VZV PCR+  Immunocompromised, although disease dose not seems disseminated, on evidence encephalopathy  Overall,  1) VZV/Shingles  - Appears single dermatome  - Acyclovir 950mg q 8 (based on IBW)  - Please have patient on IVF while on Acyclovir to offset crystal nephropathy  - Monitor Cr closely considering renal transplant  - Monitor rash  2) CKD  - Patient states baseline Cr ~1.9  - Monitor closely  - Immunosuppressives per primary team  3) Leukopenia  - Trend to normal    Buzz Shah MD  Contact on TEAMS messaging from 9am - 5pm  From 5pm-9am, and on weekends call [phone]

## 2022-03-10 NOTE — DIETITIAN INITIAL EVALUATION ADULT. - OTHER INFO
Pt states to have good PO intake. no noted nausea/vomiting/constipation/diarrhea. no noted difficulty chewing / swallowing. Last bowel movement 3/9 per flow sheets.     Dosing weight: 259.3 pounds. Endorses planned and desired weight loss, was 300 pounds a few years ago.    Weight per HIE:  253.4 3/15/21

## 2022-03-10 NOTE — PROGRESS NOTE ADULT - SUBJECTIVE AND OBJECTIVE BOX
Chief complaint  Patient is a 66y old  Male who presents with a chief complaint of 66M p/w CP and left hand rash associated w/ pain (10 Mar 2022 10:03)   Review of systems  Patient in bed, looks comfortable, no hypoglycemic episodes.    Labs and Fingersticks  CAPILLARY BLOOD GLUCOSE      POCT Blood Glucose.: 203 mg/dL (10 Mar 2022 12:17)  POCT Blood Glucose.: 144 mg/dL (10 Mar 2022 08:16)  POCT Blood Glucose.: 140 mg/dL (09 Mar 2022 22:04)  POCT Blood Glucose.: 164 mg/dL (09 Mar 2022 17:04)      Anion Gap, Serum: 12 (03-09 @ 11:18)      Calcium, Total Serum: 8.6 (03-09 @ 11:18)          03-09    138  |  104  |  27<H>  ----------------------------<  168<H>  4.0   |  22  |  1.83<H>    Ca    8.6      09 Mar 2022 11:18  Phos  2.7     03-09  Mg     1.7     03-09                          11.7   3.74  )-----------( 168      ( 09 Mar 2022 11:18 )             38.8     Medications  MEDICATIONS  (STANDING):  acyclovir IVPB 950 milliGRAM(s) IV Intermittent every 8 hours  aspirin enteric coated 81 milliGRAM(s) Oral daily  atorvastatin 80 milliGRAM(s) Oral at bedtime  carvedilol 12.5 milliGRAM(s) Oral every 12 hours  dextrose 40% Gel 15 Gram(s) Oral once  dextrose 5%. 1000 milliLiter(s) (100 mL/Hr) IV Continuous <Continuous>  dextrose 5%. 1000 milliLiter(s) (50 mL/Hr) IV Continuous <Continuous>  dextrose 50% Injectable 25 Gram(s) IV Push once  dextrose 50% Injectable 12.5 Gram(s) IV Push once  dextrose 50% Injectable 25 Gram(s) IV Push once  furosemide    Tablet 40 milliGRAM(s) Oral daily  glucagon  Injectable 1 milliGRAM(s) IntraMuscular once  heparin   Injectable 5000 Unit(s) SubCutaneous every 12 hours  insulin glargine Injectable (LANTUS) 16 Unit(s) SubCutaneous at bedtime  insulin lispro (ADMELOG) corrective regimen sliding scale   SubCutaneous three times a day before meals  insulin lispro (ADMELOG) corrective regimen sliding scale   SubCutaneous at bedtime  insulin lispro Injectable (ADMELOG) 6 Unit(s) SubCutaneous three times a day before meals  NIFEdipine XL 60 milliGRAM(s) Oral daily  NIFEdipine XL 60 milliGRAM(s) Oral at bedtime  predniSONE   Tablet 5 milliGRAM(s) Oral daily  sodium chloride 0.9%. 1000 milliLiter(s) (75 mL/Hr) IV Continuous <Continuous>  tacrolimus 4 milliGRAM(s) Oral every 12 hours  tamsulosin 0.8 milliGRAM(s) Oral at bedtime      Physical Exam  General: Patient comfortable in bed  Vital Signs Last 12 Hrs  T(F): 97.9 (03-10-22 @ 12:16), Max: 98.6 (03-10-22 @ 05:56)  HR: 75 (03-10-22 @ 12:16) (70 - 75)  BP: 160/77 (03-10-22 @ 12:16) (144/83 - 160/77)  BP(mean): --  RR: 18 (03-10-22 @ 12:16) (18 - 18)  SpO2: 98% (03-10-22 @ 12:16) (95% - 98%)  Neck: No palpable thyroid nodules.  CVS: S1S2, No murmurs  Respiratory: No wheezing, no crepitations  GI: Abdomen soft, bowel sounds positive  Musculoskeletal:  edema lower extremities.   Skin: No skin rashes, no ecchymosis    Diagnostics    Free Thyroxine, Serum: AM Sched. Collection: 09-Mar-2022 06:00 (03-08 @ 12:40)  Thyroid Stimulating Hormone, Serum: AM Sched. Collection: 09-Mar-2022 06:00 (03-08 @ 12:40)             Chief complaint  Patient is a 66y old  Male who presents with  a chief complaint of 66M p/w CP and left hand rash associated w/ pain (10 Mar 2022 10:03)   Review of systems  Patient in bed, looks comfortable, no hypoglycemic episodes.    Labs and Fingersticks  CAPILLARY BLOOD GLUCOSE      POCT Blood Glucose.: 203 mg/dL (10 Mar 2022 12:17)  POCT Blood Glucose.: 144 mg/dL (10 Mar 2022 08:16)  POCT Blood Glucose.: 140 mg/dL (09 Mar 2022 22:04)  POCT Blood Glucose.: 164 mg/dL (09 Mar 2022 17:04)      Anion Gap, Serum: 12 (03-09 @ 11:18)      Calcium, Total Serum: 8.6 (03-09 @ 11:18)          03-09    138  |  104  |  27<H>  ----------------------------<  168<H>  4.0   |  22  |  1.83<H>    Ca    8.6      09 Mar 2022 11:18  Phos  2.7     03-09  Mg     1.7     03-09                          11.7   3.74  )-----------( 168      ( 09 Mar 2022 11:18 )             38.8     Medications  MEDICATIONS  (STANDING):  acyclovir IVPB 950 milliGRAM(s) IV Intermittent every 8 hours  aspirin enteric coated 81 milliGRAM(s) Oral daily  atorvastatin 80 milliGRAM(s) Oral at bedtime  carvedilol 12.5 milliGRAM(s) Oral every 12 hours  dextrose 40% Gel 15 Gram(s) Oral once  dextrose 5%. 1000 milliLiter(s) (100 mL/Hr) IV Continuous <Continuous>  dextrose 5%. 1000 milliLiter(s) (50 mL/Hr) IV Continuous <Continuous>  dextrose 50% Injectable 25 Gram(s) IV Push once  dextrose 50% Injectable 12.5 Gram(s) IV Push once  dextrose 50% Injectable 25 Gram(s) IV Push once  furosemide    Tablet 40 milliGRAM(s) Oral daily  glucagon  Injectable 1 milliGRAM(s) IntraMuscular once  heparin   Injectable 5000 Unit(s) SubCutaneous every 12 hours  insulin glargine Injectable (LANTUS) 16 Unit(s) SubCutaneous at bedtime  insulin lispro (ADMELOG) corrective regimen sliding scale   SubCutaneous three times a day before meals  insulin lispro (ADMELOG) corrective regimen sliding scale   SubCutaneous at bedtime  insulin lispro Injectable (ADMELOG) 6 Unit(s) SubCutaneous three times a day before meals  NIFEdipine XL 60 milliGRAM(s) Oral daily  NIFEdipine XL 60 milliGRAM(s) Oral at bedtime  predniSONE   Tablet 5 milliGRAM(s) Oral daily  sodium chloride 0.9%. 1000 milliLiter(s) (75 mL/Hr) IV Continuous <Continuous>  tacrolimus 4 milliGRAM(s) Oral every 12 hours  tamsulosin 0.8 milliGRAM(s) Oral at bedtime      Physical Exam  General: Patient comfortable in bed  Vital Signs Last 12 Hrs  T(F): 97.9 (03-10-22 @ 12:16), Max: 98.6 (03-10-22 @ 05:56)  HR: 75 (03-10-22 @ 12:16) (70 - 75)  BP: 160/77 (03-10-22 @ 12:16) (144/83 - 160/77)  BP(mean): --  RR: 18 (03-10-22 @ 12:16) (18 - 18)  SpO2: 98% (03-10-22 @ 12:16) (95% - 98%)  Neck: No palpable thyroid nodules.  CVS: S1S2, No murmurs  Respiratory: No wheezing, no crepitations  GI: Abdomen soft, bowel sounds positive  Musculoskeletal:  edema lower extremities.   Skin: No skin rashes, no ecchymosis    Diagnostics    Free Thyroxine, Serum: AM Sched. Collection: 09-Mar-2022 06:00 (03-08 @ 12:40)  Thyroid Stimulating Hormone, Serum: AM Sched. Collection: 09-Mar-2022 06:00 (03-08 @ 12:40)

## 2022-03-10 NOTE — PROGRESS NOTE ADULT - SUBJECTIVE AND OBJECTIVE BOX
CC: F/U for Shingles    Saw/spoke to patient. No fevers, no chills. No new complaints.    Allergies  No Known Drug Allergies  Seafood (Pruritus; Rash)  shellfish (Hives)    ANTIMICROBIALS:  acyclovir IVPB 950 every 8 hours    PE:    Vital Signs Last 24 Hrs  T(C): 36.6 (10 Mar 2022 12:16), Max: 37.1 (09 Mar 2022 19:30)  T(F): 97.9 (10 Mar 2022 12:16), Max: 98.8 (09 Mar 2022 19:30)  HR: 75 (10 Mar 2022 12:16) (70 - 75)  BP: 160/77 (10 Mar 2022 12:16) (144/83 - 160/77)  RR: 18 (10 Mar 2022 12:16) (18 - 18)  SpO2: 98% (10 Mar 2022 12:16) (95% - 98%)    Gen: AOx3, NAD, non-toxic  Ext: R hand lesions with some remaining blisters--majority starting to crust    LABS:                        11.7   3.74  )-----------( 168      ( 09 Mar 2022 11:18 )             38.8     03-09    138  |  104  |  27<H>  ----------------------------<  168<H>  4.0   |  22  |  1.83<H>    Ca    8.6      09 Mar 2022 11:18  Phos  2.7     03-09  Mg     1.7     03-09    Urinalysis Basic - ( 08 Mar 2022 20:23 )    Color: Light Yellow / Appearance: Clear / S.023 / pH: x  Gluc: x / Ketone: Negative  / Bili: Negative / Urobili: Negative   Blood: x / Protein: Trace / Nitrite: Negative   Leuk Esterase: Negative / RBC: 1 /hpf / WBC 0 /HPF   Sq Epi: x / Non Sq Epi: 0 /hpf / Bacteria: Negative    MICROBIOLOGY:    3/8 VZV+    RADIOLOGY:    3/7 XR:    FINDINGS:  The heart size is normal.  The lungs are clear. Elevated left hemidiaphragm.  There is no pneumothorax or pleural effusion.    IMPRESSION:  Clear lungs.

## 2022-03-10 NOTE — PROVIDER CONTACT NOTE (OTHER) - ASSESSMENT
Patient alert and oriented x4. Complaint of pain 7/10 to left hand/ arm lesions. VSS. Patient requesting tylenol. Denies chest pain, N/V, dizziness, chest pain.

## 2022-03-10 NOTE — PROGRESS NOTE ADULT - SUBJECTIVE AND OBJECTIVE BOX
Patient is a 66y old  Male who presents with a chief complaint of 66M p/w CP and left hand rash associated w/ pain (10 Mar 2022 13:48)    Date of servie : 03-10-22 @ 15:59  INTERVAL HPI/OVERNIGHT EVENTS:  T(C): 36.6 (03-10-22 @ 12:16), Max: 37.1 (22 @ 19:30)  HR: 75 (03-10-22 @ 12:16) (70 - 75)  BP: 160/77 (03-10-22 @ 12:16) (144/83 - 160/77)  RR: 18 (03-10-22 @ 12:16) (18 - 18)  SpO2: 98% (03-10-22 @ 12:16) (95% - 98%)  Wt(kg): --  I&O's Summary    09 Mar 2022 07:01  -  10 Mar 2022 07:00  --------------------------------------------------------  IN: 3070 mL / OUT: 0 mL / NET: 3070 mL    10 Mar 2022 07:01  -  10 Mar 2022 15:59  --------------------------------------------------------  IN: 1230 mL / OUT: 0 mL / NET: 1230 mL        LABS:                        11.7   3.74  )-----------( 168      ( 09 Mar 2022 11:18 )             38.8     03-09    138  |  104  |  27<H>  ----------------------------<  168<H>  4.0   |  22  |  1.83<H>    Ca    8.6      09 Mar 2022 11:18  Phos  2.7     03-09  Mg     1.7     03-09        Urinalysis Basic - ( 08 Mar 2022 20:23 )    Color: Light Yellow / Appearance: Clear / S.023 / pH: x  Gluc: x / Ketone: Negative  / Bili: Negative / Urobili: Negative   Blood: x / Protein: Trace / Nitrite: Negative   Leuk Esterase: Negative / RBC: 1 /hpf / WBC 0 /HPF   Sq Epi: x / Non Sq Epi: 0 /hpf / Bacteria: Negative      CAPILLARY BLOOD GLUCOSE      POCT Blood Glucose.: 203 mg/dL (10 Mar 2022 12:17)  POCT Blood Glucose.: 144 mg/dL (10 Mar 2022 08:16)  POCT Blood Glucose.: 140 mg/dL (09 Mar 2022 22:04)  POCT Blood Glucose.: 164 mg/dL (09 Mar 2022 17:04)        Urinalysis Basic - ( 08 Mar 2022 20:23 )    Color: Light Yellow / Appearance: Clear / S.023 / pH: x  Gluc: x / Ketone: Negative  / Bili: Negative / Urobili: Negative   Blood: x / Protein: Trace / Nitrite: Negative   Leuk Esterase: Negative / RBC: 1 /hpf / WBC 0 /HPF   Sq Epi: x / Non Sq Epi: 0 /hpf / Bacteria: Negative        MEDICATIONS  (STANDING):  acyclovir IVPB 950 milliGRAM(s) IV Intermittent every 8 hours  aspirin enteric coated 81 milliGRAM(s) Oral daily  atorvastatin 80 milliGRAM(s) Oral at bedtime  carvedilol 12.5 milliGRAM(s) Oral every 12 hours  dextrose 40% Gel 15 Gram(s) Oral once  dextrose 5%. 1000 milliLiter(s) (100 mL/Hr) IV Continuous <Continuous>  dextrose 5%. 1000 milliLiter(s) (50 mL/Hr) IV Continuous <Continuous>  dextrose 50% Injectable 25 Gram(s) IV Push once  dextrose 50% Injectable 12.5 Gram(s) IV Push once  dextrose 50% Injectable 25 Gram(s) IV Push once  furosemide    Tablet 40 milliGRAM(s) Oral daily  glucagon  Injectable 1 milliGRAM(s) IntraMuscular once  heparin   Injectable 5000 Unit(s) SubCutaneous every 12 hours  insulin glargine Injectable (LANTUS) 16 Unit(s) SubCutaneous at bedtime  insulin lispro (ADMELOG) corrective regimen sliding scale   SubCutaneous three times a day before meals  insulin lispro (ADMELOG) corrective regimen sliding scale   SubCutaneous at bedtime  insulin lispro Injectable (ADMELOG) 6 Unit(s) SubCutaneous three times a day before meals  NIFEdipine XL 60 milliGRAM(s) Oral daily  NIFEdipine XL 60 milliGRAM(s) Oral at bedtime  predniSONE   Tablet 5 milliGRAM(s) Oral daily  sodium chloride 0.9%. 1000 milliLiter(s) (75 mL/Hr) IV Continuous <Continuous>  tacrolimus 4 milliGRAM(s) Oral every 12 hours  tamsulosin 0.8 milliGRAM(s) Oral at bedtime    MEDICATIONS  (PRN):  acetaminophen     Tablet .. 650 milliGRAM(s) Oral every 6 hours PRN Mild Pain (1 - 3)          PHYSICAL EXAM:  GENERAL: NAD, well-groomed, well-developed  HEAD:  Atraumatic, Normocephalic  CHEST/LUNG: Clear to percussion bilaterally; No rales, rhonchi, wheezing, or rubs  HEART: Regular rate and rhythm; No murmurs, rubs, or gallops  ABDOMEN: Soft, Nontender, Nondistended; Bowel sounds present  EXTREMITIES:  2+ Peripheral Pulses, No clubbing, cyanosis, or edema  LYMPH: No lymphadenopathy noted  SKIN: No rashes or lesions    Care Discussed with Consultants/Other Providers [ ] YES  [ ] NO

## 2022-03-10 NOTE — DIETITIAN INITIAL EVALUATION ADULT. - PERTINENT MEDS FT
MEDICATIONS  (STANDING):  acyclovir IVPB 950 milliGRAM(s) IV Intermittent every 8 hours  aspirin enteric coated 81 milliGRAM(s) Oral daily  atorvastatin 80 milliGRAM(s) Oral at bedtime  carvedilol 12.5 milliGRAM(s) Oral every 12 hours  dextrose 40% Gel 15 Gram(s) Oral once  dextrose 5%. 1000 milliLiter(s) (100 mL/Hr) IV Continuous <Continuous>  dextrose 5%. 1000 milliLiter(s) (50 mL/Hr) IV Continuous <Continuous>  dextrose 50% Injectable 25 Gram(s) IV Push once  dextrose 50% Injectable 12.5 Gram(s) IV Push once  dextrose 50% Injectable 25 Gram(s) IV Push once  furosemide    Tablet 40 milliGRAM(s) Oral daily  glucagon  Injectable 1 milliGRAM(s) IntraMuscular once  heparin   Injectable 5000 Unit(s) SubCutaneous every 12 hours  insulin glargine Injectable (LANTUS) 16 Unit(s) SubCutaneous at bedtime  insulin lispro (ADMELOG) corrective regimen sliding scale   SubCutaneous three times a day before meals  insulin lispro (ADMELOG) corrective regimen sliding scale   SubCutaneous at bedtime  insulin lispro Injectable (ADMELOG) 6 Unit(s) SubCutaneous three times a day before meals  NIFEdipine XL 60 milliGRAM(s) Oral daily  NIFEdipine XL 60 milliGRAM(s) Oral at bedtime  predniSONE   Tablet 5 milliGRAM(s) Oral daily  sodium chloride 0.9%. 1000 milliLiter(s) (75 mL/Hr) IV Continuous <Continuous>  tacrolimus 4 milliGRAM(s) Oral every 12 hours  tamsulosin 0.8 milliGRAM(s) Oral at bedtime    MEDICATIONS  (PRN):  acetaminophen     Tablet .. 650 milliGRAM(s) Oral every 6 hours PRN Mild Pain (1 - 3)

## 2022-03-10 NOTE — PROGRESS NOTE ADULT - ASSESSMENT
Assessment  DMT2: 66y Male with DM T2 with hyperglycemia, A1C 8.1%, was on insulin at home, now on basal bolus insulin, increased bolus dose yesterday, blood sugars improving and in overall acceptable range now, no hypoglycemic episodes, eating meals, NAD.  Herpes Zoster: on medications, stable, monitored.  HTN: Controlled,  on antihypertensive medications.  ESRD: s/p kidney transplant, Monitor labs/BMP  Obesity: No strict exercise routines, not on any weight loss program, neither on low calorie diet.        Millie Altamirano MD  Cell: [phone]  Office: [phone]     Assessment  DMT2: 66y Male with DM T2 with hyperglycemia, A1C 8.1%, was on insulin at home, now on basal bolus insulin, increased bolus dose yesterday, blood  sugars improving and in overall acceptable range now, no hypoglycemic episodes, eating meals, NAD.   Herpes Zoster: on medications, stable, monitored.  HTN: Controlled,  on antihypertensive medications.  ESRD: s/p kidney transplant, Monitor labs/BMP  Obesity: No strict exercise routines, not on any weight loss program, neither on low calorie diet.        Millie Altamirano MD  Cell: [phone]  Office: [phone]   written material/verbal instruction

## 2022-03-11 LAB
ANION GAP SERPL CALC-SCNC: 10 MMOL/L — SIGNIFICANT CHANGE UP (ref 5–17)
BUN SERPL-MCNC: 23 MG/DL — SIGNIFICANT CHANGE UP (ref 7–23)
CALCIUM SERPL-MCNC: 8.7 MG/DL — SIGNIFICANT CHANGE UP (ref 8.4–10.5)
CHLORIDE SERPL-SCNC: 110 MMOL/L — HIGH (ref 96–108)
CO2 SERPL-SCNC: 21 MMOL/L — LOW (ref 22–31)
CREAT SERPL-MCNC: 1.83 MG/DL — HIGH (ref 0.5–1.3)
EGFR: 40 ML/MIN/1.73M2 — LOW
GLUCOSE BLDC GLUCOMTR-MCNC: 101 MG/DL — HIGH (ref 70–99)
GLUCOSE BLDC GLUCOMTR-MCNC: 102 MG/DL — HIGH (ref 70–99)
GLUCOSE BLDC GLUCOMTR-MCNC: 111 MG/DL — HIGH (ref 70–99)
GLUCOSE BLDC GLUCOMTR-MCNC: 128 MG/DL — HIGH (ref 70–99)
GLUCOSE SERPL-MCNC: 91 MG/DL — SIGNIFICANT CHANGE UP (ref 70–99)
HCT VFR BLD CALC: 39.8 % — SIGNIFICANT CHANGE UP (ref 39–50)
HGB BLD-MCNC: 11.6 G/DL — LOW (ref 13–17)
MAGNESIUM SERPL-MCNC: 1.8 MG/DL — SIGNIFICANT CHANGE UP (ref 1.6–2.6)
MCHC RBC-ENTMCNC: 21.4 PG — LOW (ref 27–34)
MCHC RBC-ENTMCNC: 29.1 GM/DL — LOW (ref 32–36)
MCV RBC AUTO: 73.4 FL — LOW (ref 80–100)
NRBC # BLD: 0 /100 WBCS — SIGNIFICANT CHANGE UP (ref 0–0)
PHOSPHATE SERPL-MCNC: 3.3 MG/DL — SIGNIFICANT CHANGE UP (ref 2.5–4.5)
PLATELET # BLD AUTO: 164 K/UL — SIGNIFICANT CHANGE UP (ref 150–400)
POTASSIUM SERPL-MCNC: 4.1 MMOL/L — SIGNIFICANT CHANGE UP (ref 3.5–5.3)
POTASSIUM SERPL-SCNC: 4.1 MMOL/L — SIGNIFICANT CHANGE UP (ref 3.5–5.3)
RBC # BLD: 5.42 M/UL — SIGNIFICANT CHANGE UP (ref 4.2–5.8)
RBC # FLD: 16.3 % — HIGH (ref 10.3–14.5)
SODIUM SERPL-SCNC: 141 MMOL/L — SIGNIFICANT CHANGE UP (ref 135–145)
TACROLIMUS SERPL-MCNC: 7.1 NG/ML — SIGNIFICANT CHANGE UP
WBC # BLD: 3.52 K/UL — LOW (ref 3.8–10.5)
WBC # FLD AUTO: 3.52 K/UL — LOW (ref 3.8–10.5)

## 2022-03-11 PROCEDURE — 99232 SBSQ HOSP IP/OBS MODERATE 35: CPT

## 2022-03-11 RX ORDER — INSULIN GLARGINE 100 [IU]/ML
10 INJECTION, SOLUTION SUBCUTANEOUS AT BEDTIME
Refills: 0 | Status: DISCONTINUED | OUTPATIENT
Start: 2022-03-11 | End: 2022-03-12

## 2022-03-11 RX ORDER — VALACYCLOVIR 500 MG/1
1000 TABLET, FILM COATED ORAL EVERY 8 HOURS
Refills: 0 | Status: DISCONTINUED | OUTPATIENT
Start: 2022-03-11 | End: 2022-03-12

## 2022-03-11 RX ADMIN — Medication 60 MILLIGRAM(S): at 05:50

## 2022-03-11 RX ADMIN — TACROLIMUS 4 MILLIGRAM(S): 5 CAPSULE ORAL at 05:49

## 2022-03-11 RX ADMIN — Medication 5 MILLIGRAM(S): at 05:50

## 2022-03-11 RX ADMIN — Medication 169 MILLIGRAM(S): at 15:33

## 2022-03-11 RX ADMIN — Medication 6 UNIT(S): at 08:47

## 2022-03-11 RX ADMIN — VALACYCLOVIR 1000 MILLIGRAM(S): 500 TABLET, FILM COATED ORAL at 21:56

## 2022-03-11 RX ADMIN — Medication 169 MILLIGRAM(S): at 06:45

## 2022-03-11 RX ADMIN — ATORVASTATIN CALCIUM 80 MILLIGRAM(S): 80 TABLET, FILM COATED ORAL at 21:56

## 2022-03-11 RX ADMIN — Medication 60 MILLIGRAM(S): at 21:56

## 2022-03-11 RX ADMIN — TAMSULOSIN HYDROCHLORIDE 0.8 MILLIGRAM(S): 0.4 CAPSULE ORAL at 21:56

## 2022-03-11 RX ADMIN — HEPARIN SODIUM 5000 UNIT(S): 5000 INJECTION INTRAVENOUS; SUBCUTANEOUS at 05:51

## 2022-03-11 RX ADMIN — Medication 6 UNIT(S): at 12:03

## 2022-03-11 RX ADMIN — TACROLIMUS 4 MILLIGRAM(S): 5 CAPSULE ORAL at 17:07

## 2022-03-11 RX ADMIN — CARVEDILOL PHOSPHATE 12.5 MILLIGRAM(S): 80 CAPSULE, EXTENDED RELEASE ORAL at 05:50

## 2022-03-11 RX ADMIN — HEPARIN SODIUM 5000 UNIT(S): 5000 INJECTION INTRAVENOUS; SUBCUTANEOUS at 17:05

## 2022-03-11 RX ADMIN — Medication 6 UNIT(S): at 17:06

## 2022-03-11 RX ADMIN — INSULIN GLARGINE 10 UNIT(S): 100 INJECTION, SOLUTION SUBCUTANEOUS at 21:55

## 2022-03-11 RX ADMIN — Medication 81 MILLIGRAM(S): at 12:02

## 2022-03-11 RX ADMIN — Medication 40 MILLIGRAM(S): at 05:50

## 2022-03-11 RX ADMIN — CARVEDILOL PHOSPHATE 12.5 MILLIGRAM(S): 80 CAPSULE, EXTENDED RELEASE ORAL at 17:05

## 2022-03-11 NOTE — PROGRESS NOTE ADULT - ASSESSMENT
Assessment  DMT2: 66y Male with DM T2 with hyperglycemia, A1C 8.1%, was on insulin at home, now on basal bolus insulin, blood sugars in acceptable range/trending down this AM, no hypoglycemic episodes, eating meals, appears comfortable in NAD.   Herpes Zoster: on medications, stable, monitored.  HTN: Controlled,  on antihypertensive medications.  ESRD: s/p kidney transplant, Monitor labs/BMP  Obesity: No strict exercise routines, not on any weight loss program, neither on low calorie diet.        Millie Altamirano MD  Cell: [phone]  Office: [phone]     Assessment  DMT2: 66y Male with DM T2 with hyperglycemia, A1C 8.1%, was on insulin at home, now on basal bolus insulin, blood  sugars in acceptable range/trending down this AM, no hypoglycemic episodes, eating meals, appears comfortable in NAD.   Herpes Zoster: on medications, stable, monitored.  HTN: Controlled,  on antihypertensive medications.  ESRD: s/p kidney transplant, Monitor labs/BMP  Obesity: No strict exercise routines, not on any weight loss program, neither on low calorie diet.        Millie Altamirano MD  Cell: [phone]  Office: [phone]

## 2022-03-11 NOTE — PROGRESS NOTE ADULT - SUBJECTIVE AND OBJECTIVE BOX
DATE OF SERVICE: 03-11-22 @ 09:39    Subjective: Patient seen and examined. No new events except as noted.     SUBJECTIVE/ROS:      MEDICATIONS:  MEDICATIONS  (STANDING):  acyclovir IVPB 950 milliGRAM(s) IV Intermittent every 8 hours  aspirin enteric coated 81 milliGRAM(s) Oral daily  atorvastatin 80 milliGRAM(s) Oral at bedtime  carvedilol 12.5 milliGRAM(s) Oral every 12 hours  dextrose 40% Gel 15 Gram(s) Oral once  dextrose 5%. 1000 milliLiter(s) (100 mL/Hr) IV Continuous <Continuous>  dextrose 5%. 1000 milliLiter(s) (50 mL/Hr) IV Continuous <Continuous>  dextrose 50% Injectable 25 Gram(s) IV Push once  dextrose 50% Injectable 12.5 Gram(s) IV Push once  dextrose 50% Injectable 25 Gram(s) IV Push once  furosemide    Tablet 40 milliGRAM(s) Oral daily  glucagon  Injectable 1 milliGRAM(s) IntraMuscular once  heparin   Injectable 5000 Unit(s) SubCutaneous every 12 hours  insulin glargine Injectable (LANTUS) 10 Unit(s) SubCutaneous at bedtime  insulin lispro (ADMELOG) corrective regimen sliding scale   SubCutaneous three times a day before meals  insulin lispro (ADMELOG) corrective regimen sliding scale   SubCutaneous at bedtime  insulin lispro Injectable (ADMELOG) 6 Unit(s) SubCutaneous three times a day before meals  NIFEdipine XL 60 milliGRAM(s) Oral daily  NIFEdipine XL 60 milliGRAM(s) Oral at bedtime  predniSONE   Tablet 5 milliGRAM(s) Oral daily  sodium chloride 0.9%. 1000 milliLiter(s) (75 mL/Hr) IV Continuous <Continuous>  tacrolimus 4 milliGRAM(s) Oral every 12 hours  tamsulosin 0.8 milliGRAM(s) Oral at bedtime      PHYSICAL EXAM:  T(C): 36.8 (03-11-22 @ 05:40), Max: 37.1 (03-10-22 @ 22:35)  HR: 97 (03-11-22 @ 05:40) (71 - 97)  BP: 162/88 (03-11-22 @ 05:40) (152/90 - 173/91)  RR: 18 (03-11-22 @ 05:40) (18 - 18)  SpO2: 97% (03-11-22 @ 05:40) (96% - 100%)  Wt(kg): --  I&O's Summary    10 Mar 2022 07:01  -  11 Mar 2022 07:00  --------------------------------------------------------  IN: 1980 mL / OUT: 0 mL / NET: 1980 mL    11 Mar 2022 07:01  -  11 Mar 2022 09:39  --------------------------------------------------------  IN: 1150 mL / OUT: 0 mL / NET: 1150 mL            JVP: Normal  Neck: supple  Lung: clear   CV: S1 S2 , Murmur:  Abd: soft  Ext: No edema  neuro: Awake / alert  Psych: flat affect  Skin: normal``    LABS/DATA:    CARDIAC MARKERS:                                11.6   3.52  )-----------( 164      ( 11 Mar 2022 06:47 )             39.8     03-11    141  |  110<H>  |  23  ----------------------------<  91  4.1   |  21<L>  |  1.83<H>    Ca    8.7      11 Mar 2022 06:46  Phos  3.3     03-11  Mg     1.8     03-11      proBNP:   Lipid Profile:   HgA1c:   TSH:     TELE:  EKG:

## 2022-03-11 NOTE — PROGRESS NOTE ADULT - SUBJECTIVE AND OBJECTIVE BOX
[facility] DIVISION OF KIDNEY DISEASES AND HYPERTENSION -- FOLLOW UP NOTE  --------------------------------------------------------------------------------      OLGA ZEPEDA was seen and examined at bedside.     reports feeling much better than when he came in   rash has been improving   no acute issues noted overnight     Standing Inpatient Medications  acyclovir IVPB 950 milliGRAM(s) IV Intermittent every 8 hours  aspirin enteric coated 81 milliGRAM(s) Oral daily  atorvastatin 80 milliGRAM(s) Oral at bedtime  carvedilol 12.5 milliGRAM(s) Oral every 12 hours  dextrose 40% Gel 15 Gram(s) Oral once  dextrose 5%. 1000 milliLiter(s) IV Continuous <Continuous>  dextrose 5%. 1000 milliLiter(s) IV Continuous <Continuous>  dextrose 50% Injectable 25 Gram(s) IV Push once  dextrose 50% Injectable 12.5 Gram(s) IV Push once  dextrose 50% Injectable 25 Gram(s) IV Push once  furosemide    Tablet 40 milliGRAM(s) Oral daily  glucagon  Injectable 1 milliGRAM(s) IntraMuscular once  heparin   Injectable 5000 Unit(s) SubCutaneous every 12 hours  insulin glargine Injectable (LANTUS) 10 Unit(s) SubCutaneous at bedtime  insulin lispro (ADMELOG) corrective regimen sliding scale   SubCutaneous three times a day before meals  insulin lispro (ADMELOG) corrective regimen sliding scale   SubCutaneous at bedtime  insulin lispro Injectable (ADMELOG) 6 Unit(s) SubCutaneous three times a day before meals  NIFEdipine XL 60 milliGRAM(s) Oral daily  NIFEdipine XL 60 milliGRAM(s) Oral at bedtime  predniSONE   Tablet 5 milliGRAM(s) Oral daily  sodium chloride 0.9%. 1000 milliLiter(s) IV Continuous <Continuous>  tacrolimus 4 milliGRAM(s) Oral every 12 hours  tamsulosin 0.8 milliGRAM(s) Oral at bedtime    PRN Inpatient Medications  acetaminophen     Tablet .. 650 milliGRAM(s) Oral every 6 hours PRN        VITALS/PHYSICAL EXAM  --------------------------------------------------------------------------------  T(C): 36.8 (03-11-22 @ 05:40), Max: 37.1 (03-10-22 @ 22:35)  HR: 97 (03-11-22 @ 05:40) (71 - 97)  BP: 162/88 (03-11-22 @ 05:40) (152/90 - 173/91)  RR: 18 (03-11-22 @ 05:40) (18 - 18)  SpO2: 97% (03-11-22 @ 05:40) (96% - 100%)  Wt(kg): --        03-10-22 @ 07:01  -  03-11-22 @ 07:00  --------------------------------------------------------  IN: 1980 mL / OUT: 0 mL / NET: 1980 mL    03-11-22 @ 07:01  -  03-11-22 @ 12:09  --------------------------------------------------------  IN: 1390 mL / OUT: 0 mL / NET: 1390 mL      Physical Exam:  	Gen: Not in distress, well-appearing  	HEENT: pupils reactive to light, no scleral icterus, moist oral mucosa. No thrush. Supple neck, no JVD  	Pulm: normal respiratory effort, lungs clear to auscultation bilaterally   	CV: regular rate and rhythm, S1 and S2 normal, no murmur   	Abd: normoactive bowel sounds, soft and non distended abdomen. No tenderness, guarding or rigidity                    Transplant non tender, no bruit  	: No suprapubic tenderness          Back: No spinal or CVA tenderness; no pre sacral edema          Extremities: No edema. Distal pulses 2+ bilaterally           Skin: small blisters on left dorsal hand  	Neuro: Alert and oriented to person, place and time. Normal speech. Normal affect.     LABS/STUDIES  --------------------------------------------------------------------------------              11.6   3.52  >-----------<  164      [03-11-22 @ 06:47]              39.8     141  |  110  |  23  ----------------------------<  91      [03-11-22 @ 06:46]  4.1   |  21  |  1.83        Ca     8.7     [03-11-22 @ 06:46]      Mg     1.8     [03-11-22 @ 06:46]      Phos  3.3     [03-11-22 @ 06:46]            Creatinine Trend:  SCr 1.83 [03-11 @ 06:46]  SCr 1.83 [03-09 @ 11:18]  SCr 1.60 [03-08 @ 07:08]  SCr 1.84 [03-07 @ 15:45]    Tacrolimus (), Serum: 6.1 ng/mL (03-10 @ 09:30)  Tacrolimus (), Serum: 6.6 ng/mL (03-09 @ 07:50)  Tacrolimus (), Serum: 10.7 ng/mL (03-08 @ 07:51)  Tacrolimus (), Serum: 13.6 ng/mL (03-07 @ 17:10)              CAPILLARY BLOOD GLUCOSE      POCT Blood Glucose.: 128 mg/dL (11 Mar 2022 11:47)  POCT Blood Glucose.: 111 mg/dL (11 Mar 2022 08:23)  POCT Blood Glucose.: 95 mg/dL (10 Mar 2022 21:43)  POCT Blood Glucose.: 98 mg/dL (10 Mar 2022 17:33)  POCT Blood Glucose.: 203 mg/dL (10 Mar 2022 12:17)      Urinalysis - [03-08-22 @ 20:23]      Color Light Yellow / Appearance Clear / SG 1.023 / pH 6.0      Gluc Negative / Ketone Negative  / Bili Negative / Urobili Negative       Blood Trace / Protein Trace / Leuk Est Negative / Nitrite Negative      RBC 1 / WBC 0 / Hyaline 3 / Gran  / Sq Epi  / Non Sq Epi 0 / Bacteria Negative    Urine Creatinine 206      [03-08-22 @ 20:23]  Urine Protein 20      [03-08-22 @ 20:23]    HbA1c 9.2      [03-09-19 @ 10:23]  TSH 2.05      [03-09-22 @ 15:24]

## 2022-03-11 NOTE — PROGRESS NOTE ADULT - SUBJECTIVE AND OBJECTIVE BOX
Patient is a 66y old  Male who presents with a chief complaint of 66M p/w CP and left hand rash associated w/ pain (11 Mar 2022 12:17)    Date of servie : 03-11-22 @ 17:39  INTERVAL HPI/OVERNIGHT EVENTS:  T(C): 36.1 (03-11-22 @ 12:23), Max: 37.1 (03-10-22 @ 22:35)  HR: 101 (03-11-22 @ 12:23) (71 - 101)  BP: 154/85 (03-11-22 @ 12:23) (152/90 - 173/91)  RR: 18 (03-11-22 @ 12:23) (18 - 18)  SpO2: 98% (03-11-22 @ 12:23) (96% - 100%)  Wt(kg): --  I&O's Summary    10 Mar 2022 07:01  -  11 Mar 2022 07:00  --------------------------------------------------------  IN: 1980 mL / OUT: 0 mL / NET: 1980 mL    11 Mar 2022 07:01  -  11 Mar 2022 17:39  --------------------------------------------------------  IN: 1630 mL / OUT: 0 mL / NET: 1630 mL        LABS:                        11.6   3.52  )-----------( 164      ( 11 Mar 2022 06:47 )             39.8     03-11    141  |  110<H>  |  23  ----------------------------<  91  4.1   |  21<L>  |  1.83<H>    Ca    8.7      11 Mar 2022 06:46  Phos  3.3     03-11  Mg     1.8     03-11          CAPILLARY BLOOD GLUCOSE      POCT Blood Glucose.: 102 mg/dL (11 Mar 2022 16:56)  POCT Blood Glucose.: 128 mg/dL (11 Mar 2022 11:47)  POCT Blood Glucose.: 111 mg/dL (11 Mar 2022 08:23)  POCT Blood Glucose.: 95 mg/dL (10 Mar 2022 21:43)            MEDICATIONS  (STANDING):  aspirin enteric coated 81 milliGRAM(s) Oral daily  atorvastatin 80 milliGRAM(s) Oral at bedtime  carvedilol 12.5 milliGRAM(s) Oral every 12 hours  dextrose 40% Gel 15 Gram(s) Oral once  dextrose 5%. 1000 milliLiter(s) (100 mL/Hr) IV Continuous <Continuous>  dextrose 5%. 1000 milliLiter(s) (50 mL/Hr) IV Continuous <Continuous>  dextrose 50% Injectable 25 Gram(s) IV Push once  dextrose 50% Injectable 12.5 Gram(s) IV Push once  dextrose 50% Injectable 25 Gram(s) IV Push once  furosemide    Tablet 40 milliGRAM(s) Oral daily  glucagon  Injectable 1 milliGRAM(s) IntraMuscular once  heparin   Injectable 5000 Unit(s) SubCutaneous every 12 hours  insulin glargine Injectable (LANTUS) 10 Unit(s) SubCutaneous at bedtime  insulin lispro (ADMELOG) corrective regimen sliding scale   SubCutaneous three times a day before meals  insulin lispro (ADMELOG) corrective regimen sliding scale   SubCutaneous at bedtime  insulin lispro Injectable (ADMELOG) 6 Unit(s) SubCutaneous three times a day before meals  NIFEdipine XL 60 milliGRAM(s) Oral daily  NIFEdipine XL 60 milliGRAM(s) Oral at bedtime  predniSONE   Tablet 5 milliGRAM(s) Oral daily  tacrolimus 4 milliGRAM(s) Oral every 12 hours  tamsulosin 0.8 milliGRAM(s) Oral at bedtime  valACYclovir 1000 milliGRAM(s) Oral every 8 hours    MEDICATIONS  (PRN):  acetaminophen     Tablet .. 650 milliGRAM(s) Oral every 6 hours PRN Mild Pain (1 - 3)          PHYSICAL EXAM:  GENERAL: NAD, well-groomed, well-developed  HEAD:  Atraumatic, Normocephalic  CHEST/LUNG: Clear to percussion bilaterally; No rales, rhonchi, wheezing, or rubs  HEART: Regular rate and rhythm; No murmurs, rubs, or gallops  ABDOMEN: Soft, Nontender, Nondistended; Bowel sounds present  EXTREMITIES:  2+ Peripheral Pulses, No clubbing, cyanosis, or edema  LYMPH: No lymphadenopathy noted  SKIN: No rashes or lesions    Care Discussed with Consultants/Other Providers [x ] YES  [ ] NO

## 2022-03-11 NOTE — PROGRESS NOTE ADULT - ASSESSMENT
66M w/ ESRD s/p kidney transplant, DM2 on insulin, HTN p/w CP and left hand rash associated w/ pain admit for further evaluation of vesicular rash requiring evaluation/management for herpes zoster infection in immunocompetent host.    Problem/Plan - 1:  ·  Problem: Herpes zoster infection.   ·  Plan: - ID consult apprreciated  - started valtrex by ID  - will monitor      Problem/Plan - 2:  ·  Problem: Chest pain.   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   ·  Plan: nephrology consult appreciated   c/w home dosing of cellcelpt, prograf and prednisone.    Problem/Plan - 4:  ·  Problem: Type 2 diabetes mellitus. ·  Plan: - monitor FS  - basal, bolus    Problem/Plan - 5:  ·  Problem: HTN (hypertension).   ·  Plan: -   - c/w carvedilol, furosemide, nifedipine.  Problem/Plan - 6:  ·  Problem: Microcytic anemia.   ·  Plan: monitor w/ cbcno signs of active hemorrhage.    Problem/Plan - 7:  ·  Problem: Prophylactic measure.   ·  Plan: HSQ for dvt ppx.    dc planning in am

## 2022-03-11 NOTE — PROGRESS NOTE ADULT - ASSESSMENT
Patient is a 66 year old male with PMH of ESRD s/p DDRT in 2011 at [facility], Dm, HTN who presented to the hospital with one week of worsening left arm pain and left sided chest pain. he states he started to notice a left hand pain and blistering which progressively got worse and made its way up his arm to the left side of his chest. Patient is being treated for shingles.

## 2022-03-11 NOTE — PROGRESS NOTE ADULT - SUBJECTIVE AND OBJECTIVE BOX
Chief complaint  Patient is a 66y old  Male who presents with a chief complaint of 66M p/w CP and left hand rash associated w/ pain (11 Mar 2022 12:09)   Review of systems  Patient in bed, looks comfortable, no hypoglycemic episodes.    Labs and Fingersticks  CAPILLARY BLOOD GLUCOSE      POCT Blood Glucose.: 128 mg/dL (11 Mar 2022 11:47)  POCT Blood Glucose.: 111 mg/dL (11 Mar 2022 08:23)  POCT Blood Glucose.: 95 mg/dL (10 Mar 2022 21:43)  POCT Blood Glucose.: 98 mg/dL (10 Mar 2022 17:33)      Anion Gap, Serum: 10 (03-11 @ 06:46)      Calcium, Total Serum: 8.7 (03-11 @ 06:46)          03-11    141  |  110<H>  |  23  ----------------------------<  91  4.1   |  21<L>  |  1.83<H>    Ca    8.7      11 Mar 2022 06:46  Phos  3.3     03-11  Mg     1.8     03-11                          11.6   3.52  )-----------( 164      ( 11 Mar 2022 06:47 )             39.8     Medications  MEDICATIONS  (STANDING):  acyclovir IVPB 950 milliGRAM(s) IV Intermittent every 8 hours  aspirin enteric coated 81 milliGRAM(s) Oral daily  atorvastatin 80 milliGRAM(s) Oral at bedtime  carvedilol 12.5 milliGRAM(s) Oral every 12 hours  dextrose 40% Gel 15 Gram(s) Oral once  dextrose 5%. 1000 milliLiter(s) (100 mL/Hr) IV Continuous <Continuous>  dextrose 5%. 1000 milliLiter(s) (50 mL/Hr) IV Continuous <Continuous>  dextrose 50% Injectable 25 Gram(s) IV Push once  dextrose 50% Injectable 12.5 Gram(s) IV Push once  dextrose 50% Injectable 25 Gram(s) IV Push once  furosemide    Tablet 40 milliGRAM(s) Oral daily  glucagon  Injectable 1 milliGRAM(s) IntraMuscular once  heparin   Injectable 5000 Unit(s) SubCutaneous every 12 hours  insulin glargine Injectable (LANTUS) 10 Unit(s) SubCutaneous at bedtime  insulin lispro (ADMELOG) corrective regimen sliding scale   SubCutaneous three times a day before meals  insulin lispro (ADMELOG) corrective regimen sliding scale   SubCutaneous at bedtime  insulin lispro Injectable (ADMELOG) 6 Unit(s) SubCutaneous three times a day before meals  NIFEdipine XL 60 milliGRAM(s) Oral daily  NIFEdipine XL 60 milliGRAM(s) Oral at bedtime  predniSONE   Tablet 5 milliGRAM(s) Oral daily  sodium chloride 0.9%. 1000 milliLiter(s) (75 mL/Hr) IV Continuous <Continuous>  tacrolimus 4 milliGRAM(s) Oral every 12 hours  tamsulosin 0.8 milliGRAM(s) Oral at bedtime      Physical Exam  General: Patient comfortable in bed  Vital Signs Last 12 Hrs  T(F): 98.3 (03-11-22 @ 05:40), Max: 98.4 (03-11-22 @ 00:20)  HR: 97 (03-11-22 @ 05:40) (76 - 97)  BP: 162/88 (03-11-22 @ 05:40) (162/88 - 164/77)  BP(mean): --  RR: 18 (03-11-22 @ 05:40) (18 - 18)  SpO2: 97% (03-11-22 @ 05:40) (96% - 97%)  Neck: No palpable thyroid nodules.  CVS: S1S2, No murmurs  Respiratory: No wheezing, no crepitations  GI: Abdomen soft, bowel sounds positive  Musculoskeletal:  edema lower extremities.   Skin: No skin rashes, no ecchymosis    Diagnostics    Free Thyroxine, Serum: AM Sched. Collection: 09-Mar-2022 06:00 (03-08 @ 12:40)  Thyroid Stimulating Hormone, Serum: AM Sched. Collection: 09-Mar-2022 06:00 (03-08 @ 12:40)             Chief complaint  Patient is a 66y old  Male who presents with a chief complaint of 66M p/w CP and left hand rash associated w/ pain (11 Mar 2022 12:09)   Review of systems  Patient in bed, looks comfortable, no hypoglycemic episodes.    Labs and Fingersticks  CAPILLARY BLOOD GLUCOSE      POCT Blood Glucose.: 128 mg/dL (11 Mar 2022 11:47)  POCT Blood Glucose.: 111 mg/dL (11 Mar 2022 08:23)  POCT Blood Glucose.: 95 mg/dL (10 Mar 2022 21:43)  POCT Blood Glucose.: 98 mg/dL (10 Mar 2022 17:33)      Anion Gap, Serum: 10 (03-11 @ 06:46)      Calcium, Total Serum: 8.7 (03-11 @ 06:46)          03-11    141  |  110<H>  |  23  ----------------------------<  91  4.1   |  21<L>  |  1.83<H>    Ca    8.7      11 Mar 2022 06:46  Phos  3.3     03-11  Mg     1.8     03-11                          11.6   3.52  )-----------( 164      ( 11 Mar 2022 06:47 )               39.8     Medications  MEDICATIONS  (STANDING):  acyclovir IVPB 950 milliGRAM(s) IV Intermittent every 8 hours  aspirin enteric coated 81 milliGRAM(s) Oral daily  atorvastatin 80 milliGRAM(s) Oral at bedtime  carvedilol 12.5 milliGRAM(s) Oral every 12 hours  dextrose 40% Gel 15 Gram(s) Oral once  dextrose 5%. 1000 milliLiter(s) (100 mL/Hr) IV Continuous <Continuous>  dextrose 5%. 1000 milliLiter(s) (50 mL/Hr) IV Continuous <Continuous>  dextrose 50% Injectable 25 Gram(s) IV Push once  dextrose 50% Injectable 12.5 Gram(s) IV Push once  dextrose 50% Injectable 25 Gram(s) IV Push once  furosemide    Tablet 40 milliGRAM(s) Oral daily  glucagon  Injectable 1 milliGRAM(s) IntraMuscular once  heparin   Injectable 5000 Unit(s) SubCutaneous every 12 hours  insulin glargine Injectable (LANTUS) 10 Unit(s) SubCutaneous at bedtime  insulin lispro (ADMELOG) corrective regimen sliding scale   SubCutaneous three times a day before meals  insulin lispro (ADMELOG) corrective regimen sliding scale   SubCutaneous at bedtime  insulin lispro Injectable (ADMELOG) 6 Unit(s) SubCutaneous three times a day before meals  NIFEdipine XL 60 milliGRAM(s) Oral daily  NIFEdipine XL 60 milliGRAM(s) Oral at bedtime  predniSONE   Tablet 5 milliGRAM(s) Oral daily  sodium chloride 0.9%. 1000 milliLiter(s) (75 mL/Hr) IV Continuous <Continuous>  tacrolimus 4 milliGRAM(s) Oral every 12 hours  tamsulosin 0.8 milliGRAM(s) Oral at bedtime      Physical Exam  General: Patient comfortable in bed  Vital Signs Last 12 Hrs  T(F): 98.3 (03-11-22 @ 05:40), Max: 98.4 (03-11-22 @ 00:20)  HR: 97 (03-11-22 @ 05:40) (76 - 97)  BP: 162/88 (03-11-22 @ 05:40) (162/88 - 164/77)  BP(mean): --  RR: 18 (03-11-22 @ 05:40) (18 - 18)  SpO2: 97% (03-11-22 @ 05:40) (96% - 97%)  Neck: No palpable thyroid nodules.  CVS: S1S2, No murmurs  Respiratory: No wheezing, no crepitations  GI: Abdomen soft, bowel sounds positive  Musculoskeletal:  edema lower extremities.   Skin: No skin rashes, no ecchymosis    Diagnostics    Free Thyroxine, Serum: AM Sched. Collection: 09-Mar-2022 06:00 (03-08 @ 12:40)  Thyroid Stimulating Hormone, Serum: AM Sched. Collection: 09-Mar-2022 06:00 (03-08 @ 12:40)

## 2022-03-11 NOTE — PROGRESS NOTE ADULT - ASSESSMENT
67 yo M ESRD s/p renal transplant >10 years ago, DDRT, initially with L sided hand rash  Leukopenia, no fever  Blistering rash along C7 dermatome  VZV PCR+  Immunocompromised, although disease dose not seems disseminated, NO evidence encephalopathy  Lesions mostly crusting with some remaining vesicles at L hand  Overall,  1) VZV/Shingles  - Appears single dermatome  - Valtrex 1g q 8 through 3/17/22 (when DC planning, complete PO as outpatient)  - Majority lesions crusting--when all lesions crusted can DC isolation  2) CKD  - Patient states baseline Cr ~1.9  - Monitor closely  - Immunosuppressives per primary team  3) Leukopenia  - Trend to normal    Signing off. Please call with further questions or change in status.    Buzz Shah MD  Contact on TEAMS messaging from 9am - 5pm  From 5pm-9am, and on weekends call [phone]

## 2022-03-11 NOTE — PROGRESS NOTE ADULT - SUBJECTIVE AND OBJECTIVE BOX
CC: F/U for VZV    Saw/spoke to patient. Unchanged. Doing well.    Allergies  No Known Drug Allergies  Seafood (Pruritus; Rash)  shellfish (Hives)    ANTIMICROBIALS:  acyclovir IVPB 950 every 8 hours    PE:    Vital Signs Last 24 Hrs  T(C): 36.1 (11 Mar 2022 12:23), Max: 37.1 (10 Mar 2022 22:35)  T(F): 97 (11 Mar 2022 12:23), Max: 98.7 (10 Mar 2022 22:35)  HR: 101 (11 Mar 2022 12:23) (71 - 101)  BP: 154/85 (11 Mar 2022 12:23) (152/90 - 173/91)  RR: 18 (11 Mar 2022 12:23) (18 - 18)  SpO2: 98% (11 Mar 2022 12:23) (96% - 100%)    Gen: AOx3, NAD, non-toxic  Ext: LUE majority lesions crusting except for 2-3 lesions at L digit (significantly improving)    LABS:                        11.6   3.52  )-----------( 164      ( 11 Mar 2022 06:47 )             39.8     03-11    141  |  110<H>  |  23  ----------------------------<  91  4.1   |  21<L>  |  1.83<H>    Ca    8.7      11 Mar 2022 06:46  Phos  3.3     03-11  Mg     1.8     03-11    MICROBIOLOGY:    VZV PCR+    RADIOLOGY:    3/7 XR:    FINDINGS:  The heart size is normal.  The lungs are clear. Elevated left hemidiaphragm.  There is no pneumothorax or pleural effusion.    IMPRESSION:  Clear lungs.

## 2022-03-12 ENCOUNTER — TRANSCRIPTION ENCOUNTER (OUTPATIENT)
Age: 67
End: 2022-03-12

## 2022-03-12 VITALS
OXYGEN SATURATION: 97 % | RESPIRATION RATE: 18 BRPM | SYSTOLIC BLOOD PRESSURE: 166 MMHG | HEART RATE: 82 BPM | TEMPERATURE: 98 F | DIASTOLIC BLOOD PRESSURE: 80 MMHG

## 2022-03-12 LAB
GLUCOSE BLDC GLUCOMTR-MCNC: 234 MG/DL — HIGH (ref 70–99)
GLUCOSE BLDC GLUCOMTR-MCNC: 87 MG/DL — SIGNIFICANT CHANGE UP (ref 70–99)

## 2022-03-12 PROCEDURE — 84484 ASSAY OF TROPONIN QUANT: CPT

## 2022-03-12 PROCEDURE — U0005: CPT

## 2022-03-12 PROCEDURE — 82962 GLUCOSE BLOOD TEST: CPT

## 2022-03-12 PROCEDURE — 85025 COMPLETE CBC W/AUTO DIFF WBC: CPT

## 2022-03-12 PROCEDURE — 87529 HSV DNA AMP PROBE: CPT

## 2022-03-12 PROCEDURE — 36415 COLL VENOUS BLD VENIPUNCTURE: CPT

## 2022-03-12 PROCEDURE — 85027 COMPLETE CBC AUTOMATED: CPT

## 2022-03-12 PROCEDURE — 80197 ASSAY OF TACROLIMUS: CPT

## 2022-03-12 PROCEDURE — 84156 ASSAY OF PROTEIN URINE: CPT

## 2022-03-12 PROCEDURE — 80053 COMPREHEN METABOLIC PANEL: CPT

## 2022-03-12 PROCEDURE — 80180 DRUG SCRN QUAN MYCOPHENOLATE: CPT

## 2022-03-12 PROCEDURE — 82570 ASSAY OF URINE CREATININE: CPT

## 2022-03-12 PROCEDURE — 96375 TX/PRO/DX INJ NEW DRUG ADDON: CPT

## 2022-03-12 PROCEDURE — 81001 URINALYSIS AUTO W/SCOPE: CPT

## 2022-03-12 PROCEDURE — 84100 ASSAY OF PHOSPHORUS: CPT

## 2022-03-12 PROCEDURE — 84443 ASSAY THYROID STIM HORMONE: CPT

## 2022-03-12 PROCEDURE — U0003: CPT

## 2022-03-12 PROCEDURE — 83735 ASSAY OF MAGNESIUM: CPT

## 2022-03-12 PROCEDURE — 99285 EMERGENCY DEPT VISIT HI MDM: CPT

## 2022-03-12 PROCEDURE — 71045 X-RAY EXAM CHEST 1 VIEW: CPT

## 2022-03-12 PROCEDURE — 83036 HEMOGLOBIN GLYCOSYLATED A1C: CPT

## 2022-03-12 PROCEDURE — 87798 DETECT AGENT NOS DNA AMP: CPT

## 2022-03-12 PROCEDURE — 96374 THER/PROPH/DIAG INJ IV PUSH: CPT

## 2022-03-12 PROCEDURE — 93971 EXTREMITY STUDY: CPT

## 2022-03-12 PROCEDURE — 93971 EXTREMITY STUDY: CPT | Mod: 26,LT

## 2022-03-12 PROCEDURE — 80048 BASIC METABOLIC PNL TOTAL CA: CPT

## 2022-03-12 RX ORDER — INSULIN GLARGINE 100 [IU]/ML
10 INJECTION, SOLUTION SUBCUTANEOUS
Qty: 0 | Refills: 0 | DISCHARGE
Start: 2022-03-12

## 2022-03-12 RX ORDER — ACETAMINOPHEN 500 MG
2 TABLET ORAL
Qty: 0 | Refills: 0 | DISCHARGE
Start: 2022-03-12

## 2022-03-12 RX ORDER — VALACYCLOVIR 500 MG/1
1 TABLET, FILM COATED ORAL
Qty: 0 | Refills: 0 | DISCHARGE
Start: 2022-03-12

## 2022-03-12 RX ADMIN — HEPARIN SODIUM 5000 UNIT(S): 5000 INJECTION INTRAVENOUS; SUBCUTANEOUS at 06:18

## 2022-03-12 RX ADMIN — CARVEDILOL PHOSPHATE 12.5 MILLIGRAM(S): 80 CAPSULE, EXTENDED RELEASE ORAL at 06:18

## 2022-03-12 RX ADMIN — Medication 2: at 12:31

## 2022-03-12 RX ADMIN — VALACYCLOVIR 1000 MILLIGRAM(S): 500 TABLET, FILM COATED ORAL at 06:19

## 2022-03-12 RX ADMIN — Medication 60 MILLIGRAM(S): at 06:18

## 2022-03-12 RX ADMIN — Medication 40 MILLIGRAM(S): at 06:18

## 2022-03-12 RX ADMIN — Medication 81 MILLIGRAM(S): at 12:31

## 2022-03-12 RX ADMIN — Medication 6 UNIT(S): at 12:31

## 2022-03-12 RX ADMIN — Medication 6 UNIT(S): at 08:21

## 2022-03-12 RX ADMIN — Medication 5 MILLIGRAM(S): at 06:19

## 2022-03-12 RX ADMIN — VALACYCLOVIR 1000 MILLIGRAM(S): 500 TABLET, FILM COATED ORAL at 12:31

## 2022-03-12 RX ADMIN — TACROLIMUS 4 MILLIGRAM(S): 5 CAPSULE ORAL at 06:18

## 2022-03-12 NOTE — DISCHARGE NOTE PROVIDER - CARE PROVIDER_API CALL
Buzz Shah)  Infectious Disease; Internal Medicine  [address] [place] [address]  Phone: [phone]  Fax: [phone]  Follow Up Time:

## 2022-03-12 NOTE — DISCHARGE NOTE NURSING/CASE MANAGEMENT/SOCIAL WORK - NSDCPNINST_GEN_ALL_CORE
call for follow up appointments  with primary care md   diet  meds activity as pe md any severe pain nausea  vomiting fevers  increased rash any problems call md call 911 [facility]  EMERGENCY ROOM

## 2022-03-12 NOTE — DISCHARGE NOTE NURSING/CASE MANAGEMENT/SOCIAL WORK - PATIENT PORTAL LINK FT
You can access the FollowMyHealth Patient Portal offered by [facility] by registering at the following website: [email or web address]. By joining [employer]’s FollowMyHealth portal, you will also be able to view your health information using other applications (apps) compatible with our system.

## 2022-03-12 NOTE — PROGRESS NOTE ADULT - SUBJECTIVE AND OBJECTIVE BOX
DATE OF SERVICE: 03-12-22 @ 10:33    Subjective: Patient seen and examined. No new events except as noted.     SUBJECTIVE/ROS:        MEDICATIONS:  MEDICATIONS  (STANDING):  aspirin enteric coated 81 milliGRAM(s) Oral daily  atorvastatin 80 milliGRAM(s) Oral at bedtime  carvedilol 12.5 milliGRAM(s) Oral every 12 hours  dextrose 40% Gel 15 Gram(s) Oral once  dextrose 5%. 1000 milliLiter(s) (100 mL/Hr) IV Continuous <Continuous>  dextrose 5%. 1000 milliLiter(s) (50 mL/Hr) IV Continuous <Continuous>  dextrose 50% Injectable 25 Gram(s) IV Push once  dextrose 50% Injectable 12.5 Gram(s) IV Push once  dextrose 50% Injectable 25 Gram(s) IV Push once  furosemide    Tablet 40 milliGRAM(s) Oral daily  glucagon  Injectable 1 milliGRAM(s) IntraMuscular once  heparin   Injectable 5000 Unit(s) SubCutaneous every 12 hours  insulin glargine Injectable (LANTUS) 10 Unit(s) SubCutaneous at bedtime  insulin lispro (ADMELOG) corrective regimen sliding scale   SubCutaneous three times a day before meals  insulin lispro (ADMELOG) corrective regimen sliding scale   SubCutaneous at bedtime  insulin lispro Injectable (ADMELOG) 6 Unit(s) SubCutaneous three times a day before meals  NIFEdipine XL 60 milliGRAM(s) Oral daily  NIFEdipine XL 60 milliGRAM(s) Oral at bedtime  predniSONE   Tablet 5 milliGRAM(s) Oral daily  tacrolimus 4 milliGRAM(s) Oral every 12 hours  tamsulosin 0.8 milliGRAM(s) Oral at bedtime  valACYclovir 1000 milliGRAM(s) Oral every 8 hours      PHYSICAL EXAM:  T(C): 36.7 (03-12-22 @ 07:42), Max: 36.8 (03-12-22 @ 06:14)  HR: 84 (03-12-22 @ 07:42) (73 - 101)  BP: 166/83 (03-12-22 @ 07:42) (149/88 - 166/85)  RR: 17 (03-12-22 @ 07:42) (16 - 18)  SpO2: 96% (03-12-22 @ 07:42) (96% - 98%)  Wt(kg): --  I&O's Summary    11 Mar 2022 07:01  -  12 Mar 2022 07:00  --------------------------------------------------------  IN: 2880 mL / OUT: 0 mL / NET: 2880 mL            JVP: Normal  Neck: supple  Lung: clear   CV: S1 S2 , Murmur:  Abd: soft  Ext: No edema  neuro: Awake / alert  Psych: flat affect  Skin: normal``    LABS/DATA:    CARDIAC MARKERS:                                11.6   3.52  )-----------( 164      ( 11 Mar 2022 06:47 )             39.8     03-11    141  |  110<H>  |  23  ----------------------------<  91  4.1   |  21<L>  |  1.83<H>    Ca    8.7      11 Mar 2022 06:46  Phos  3.3     03-11  Mg     1.8     03-11      proBNP:   Lipid Profile:   HgA1c:   TSH:     TELE:  EKG:

## 2022-03-12 NOTE — PROGRESS NOTE ADULT - PROBLEM SELECTOR PLAN 1
history of DDRT in 2011 at [facility]; follows with Dr. Smith   baseline Creatinine is around 1.8-2mg/dL; renal function as baseline right now   home immunosuppression with tacrolimus 4mg BID, Cellcept 250mg BID, and prednisone 5mg daily   --- will hold Cellcept in the setting of his acute infection   continue with tacrolimus at current dose; monitor tacrolimus levels 30 minutes prior to AM dose   goal 4-6  please dose medications as per eGFR.    If any questions, please feel free to contact me     Maxim Hamm  Nephrology Fellow  [facility] Pager: [phone]
Will decrease Lantus to 10u at bedtime.  Will continue Admelog 6u before each meal and Admelog correction scale coverage. Will continue monitoring FS and FU.  Patient was on basal bolus insulin at home, Can DC on current insulin doses if blood sugars remain stable.  Patient counseled for compliance with consistent low carb diet and exercise as tolerated outpatient.
Will continue current insulin regimen for now. Will continue monitoring FS, log, and FU.  Patient was on basal bolus insulin at home, Can DC on current insulin doses if blood sugars remain stable.  Patient counseled for compliance with consistent low carb diet and exercise as tolerated outpatient.
Will continue Lantus 15u at bedtime.  Will increase Admelog to 6u before each meal and continue Admelog correction scale coverage. Will continue monitoring FS and FU.  Patient counseled for compliance with consistent low carb diet and exercise as tolerated outpatient.
Will continue current insulin regimen for now. Will continue monitoring  blood sugars, will Follow up.  Patient was on basal bolus insulin at home, Can DC on current insulin doses if blood sugars remain stable.  Patient counseled for compliance with consistent low carb diet and exercise as tolerated outpatient.

## 2022-03-12 NOTE — PROGRESS NOTE ADULT - SUBJECTIVE AND OBJECTIVE BOX
Chief complaint    Patient is a 66y old  Male who presents with a chief complaint of 66M p/w CP and left hand rash associated w/ pain (12 Mar 2022 10:33)   Review of systems  Patient in bed, appears comfortable.    Labs and Fingersticks  CAPILLARY BLOOD GLUCOSE      POCT Blood Glucose.: 87 mg/dL (12 Mar 2022 08:20)  POCT Blood Glucose.: 101 mg/dL (11 Mar 2022 21:08)  POCT Blood Glucose.: 102 mg/dL (11 Mar 2022 16:56)      Anion Gap, Serum: 10 (03-11 @ 06:46)      Calcium, Total Serum: 8.7 (03-11 @ 06:46)          03-11    141  |  110<H>  |  23  ----------------------------<  91  4.1   |  21<L>  |  1.83<H>    Ca    8.7      11 Mar 2022 06:46  Phos  3.3     03-11  Mg     1.8     03-11                          11.6   3.52  )-----------( 164      ( 11 Mar 2022 06:47 )             39.8     Medications  MEDICATIONS  (STANDING):  aspirin enteric coated 81 milliGRAM(s) Oral daily  atorvastatin 80 milliGRAM(s) Oral at bedtime  carvedilol 12.5 milliGRAM(s) Oral every 12 hours  dextrose 40% Gel 15 Gram(s) Oral once  dextrose 5%. 1000 milliLiter(s) (100 mL/Hr) IV Continuous <Continuous>  dextrose 5%. 1000 milliLiter(s) (50 mL/Hr) IV Continuous <Continuous>  dextrose 50% Injectable 25 Gram(s) IV Push once  dextrose 50% Injectable 12.5 Gram(s) IV Push once  dextrose 50% Injectable 25 Gram(s) IV Push once  furosemide    Tablet 40 milliGRAM(s) Oral daily  glucagon  Injectable 1 milliGRAM(s) IntraMuscular once  heparin   Injectable 5000 Unit(s) SubCutaneous every 12 hours  insulin glargine Injectable (LANTUS) 10 Unit(s) SubCutaneous at bedtime  insulin lispro (ADMELOG) corrective regimen sliding scale   SubCutaneous three times a day before meals  insulin lispro (ADMELOG) corrective regimen sliding scale   SubCutaneous at bedtime  insulin lispro Injectable (ADMELOG) 6 Unit(s) SubCutaneous three times a day before meals  NIFEdipine XL 60 milliGRAM(s) Oral daily  NIFEdipine XL 60 milliGRAM(s) Oral at bedtime  predniSONE   Tablet 5 milliGRAM(s) Oral daily  tacrolimus 4 milliGRAM(s) Oral every 12 hours  tamsulosin 0.8 milliGRAM(s) Oral at bedtime  valACYclovir 1000 milliGRAM(s) Oral every 8 hours      Physical Exam  General: Patient comfortable in bed  Vital Signs Last 12 Hrs  T(F): 98 (03-12-22 @ 07:42), Max: 98.3 (03-12-22 @ 06:14)  HR: 84 (03-12-22 @ 07:42) (75 - 84)  BP: 166/83 (03-12-22 @ 07:42) (166/83 - 166/85)  BP(mean): --  RR: 17 (03-12-22 @ 07:42) (16 - 17)  SpO2: 96% (03-12-22 @ 07:42) (96% - 97%)  Neck: No palpable thyroid nodules.  CVS: S1S2, No murmurs  Respiratory: No wheezing, no crepitations  GI: Abdomen soft, bowel sounds positive  Musculoskeletal:  edema lower extremities.     Diagnostics

## 2022-03-12 NOTE — PROGRESS NOTE ADULT - PROVIDER SPECIALTY LIST ADULT
Hospitalist
Hospitalist
Cardiology
Infectious Disease
Cardiology
Hospitalist
Infectious Disease
Infectious Disease
Transplant Nephrology
Cardiology
Hospitalist
Endocrinology

## 2022-03-12 NOTE — DISCHARGE NOTE PROVIDER - NSDCMRMEDTOKEN_GEN_ALL_CORE_FT
Aspirin Enteric Coated 81 mg oral delayed release tablet: 1 tab(s) orally once a day  carvedilol 12.5 mg oral tablet: 1 tab(s) orally 2 times a day  CellCept 250 mg oral capsule: 1 cap(s) orally 2 times a day  furosemide 20 mg oral tablet: 2 tab(s) orally once a day (at bedtime)  Levemir FlexPen 100 units/mL subcutaneous solution: 12 unit(s) subcutaneous once a day (at bedtime)  NIFEdipine 30 mg oral tablet, extended release: 1 tab(s) orally once a day (at bedtime)  NIFEdipine 30 mg oral tablet, extended release: 2 tab(s) orally once a day  NovoLOG FlexPen 100 units/mL injectable solution: 5 unit(s) injectable 3 times a day; Note patient typically doses upon sliding scale with usually at least 5U with meal  predniSONE 5 mg oral tablet: 1 tab(s) orally once a day  Prograf 1 mg oral capsule: 4 cap(s) orally every 12 hours  rosuvastatin 20 mg oral tablet: 1 tab(s) orally once a day  tamsulosin 0.4 mg oral capsule: 2 cap(s) orally once a day  Vitamin D2 1.25 mg (50,000 intl units) oral capsule: 1 cap(s) orally once a week   acetaminophen 325 mg oral tablet: 2 tab(s) orally every 6 hours, As needed, Mild Pain (1 - 3)  Aspirin Enteric Coated 81 mg oral delayed release tablet: 1 tab(s) orally once a day  carvedilol 12.5 mg oral tablet: 1 tab(s) orally 2 times a day  CellCept 250 mg oral capsule: 1 cap(s) orally 2 times a day  furosemide 20 mg oral tablet: 2 tab(s) orally once a day (at bedtime)  insulin glargine: 10 unit(s) subcutaneously once a day (at bedtime)  Levemir FlexPen 100 units/mL subcutaneous solution: 12 unit(s) subcutaneous once a day (at bedtime)  NIFEdipine 30 mg oral tablet, extended release: 1 tab(s) orally once a day (at bedtime)  NIFEdipine 30 mg oral tablet, extended release: 2 tab(s) orally once a day  NovoLOG FlexPen 100 units/mL injectable solution: 5 unit(s) injectable 3 times a day; Note patient typically doses upon sliding scale with usually at least 5U with meal  predniSONE 5 mg oral tablet: 1 tab(s) orally once a day  Prograf 1 mg oral capsule: 4 cap(s) orally every 12 hours  rosuvastatin 20 mg oral tablet: 1 tab(s) orally once a day  tamsulosin 0.4 mg oral capsule: 2 cap(s) orally once a day  valACYclovir 1 g oral tablet: 1 tab(s) orally every 8 hours  stop after last dose on 3/17/22  Vitamin D2 1.25 mg (50,000 intl units) oral capsule: 1 cap(s) orally once a week

## 2022-03-12 NOTE — DISCHARGE NOTE NURSING/CASE MANAGEMENT/SOCIAL WORK - NSDCPEFALRISK_GEN_ALL_CORE
For information on Fall & Injury Prevention, visit: [email or web address] OR  [email or web address] OR  https://www.cdc.gov/steadi/patient.html

## 2022-03-12 NOTE — PROGRESS NOTE ADULT - REASON FOR ADMISSION
66M p/w CP and left hand rash associated w/ pain

## 2022-03-12 NOTE — PROGRESS NOTE ADULT - PROBLEM SELECTOR PROBLEM 1
Type 2 diabetes mellitus
Kidney transplant recipient
Type 2 diabetes mellitus

## 2022-03-12 NOTE — PROGRESS NOTE ADULT - ASSESSMENT
Assessment  DMT2: 66y Male with DM T2 with hyperglycemia, A1C 8.1%, was on insulin at home, now on basal bolus insulin, blood  sugars in trending down, no hypoglycemic episodes, eating meals, appears comfortable.   Herpes Zoster: on medications, stable, monitored.  HTN: Controlled,  on antihypertensive medications.  ESRD: s/p kidney transplant, Monitor labs/BMP  Obesity: No strict exercise routines, not on any weight loss program, neither on low calorie diet.        Millie Altamirano MD  Cell: [phone]  Office: [phone]

## 2022-03-13 RX ORDER — VALACYCLOVIR 500 MG/1
1 TABLET, FILM COATED ORAL
Qty: 15 | Refills: 0
Start: 2022-03-13 | End: 2022-03-17

## 2022-04-04 ENCOUNTER — APPOINTMENT (OUTPATIENT)
Dept: INFECTIOUS DISEASE | Facility: CLINIC | Age: 67
End: 2022-04-04
Payer: MEDICARE

## 2022-04-04 VITALS
DIASTOLIC BLOOD PRESSURE: 82 MMHG | SYSTOLIC BLOOD PRESSURE: 149 MMHG | TEMPERATURE: 97.6 F | WEIGHT: 257 LBS | HEIGHT: 74 IN | OXYGEN SATURATION: 96 % | BODY MASS INDEX: 32.98 KG/M2 | RESPIRATION RATE: 16 BRPM | HEART RATE: 71 BPM

## 2022-04-04 DIAGNOSIS — B02.29 OTHER POSTHERPETIC NERVOUS SYSTEM INVOLVEMENT: ICD-10-CM

## 2022-04-04 DIAGNOSIS — B02.9 ZOSTER W/OUT COMPLICATIONS: ICD-10-CM

## 2022-04-04 DIAGNOSIS — D84.9 IMMUNODEFICIENCY, UNSPECIFIED: ICD-10-CM

## 2022-04-04 PROCEDURE — 99214 OFFICE O/P EST MOD 30 MIN: CPT

## 2022-04-04 RX ORDER — GABAPENTIN 300 MG/1
300 CAPSULE ORAL
Qty: 90 | Refills: 0 | Status: ACTIVE | COMMUNITY
Start: 2022-04-04 | End: 1900-01-01

## 2022-04-13 NOTE — DISCHARGE NOTE NURSING/CASE MANAGEMENT/SOCIAL WORK - NURSING SECTION COMPLETE
Size Of Lesion In Cm (Optional): 0
Detail Level: Detailed
Body Location Override (Optional - Billing Will Still Be Based On Selected Body Map Location If Applicable): left forehead
Detail Level: Simple
Patient/Caregiver provided printed discharge information.

## 2022-04-15 PROBLEM — B02.9 SHINGLES: Status: ACTIVE | Noted: 2022-04-15

## 2022-04-15 PROBLEM — D84.9 IMMUNOCOMPROMISED: Status: ACTIVE | Noted: 2022-04-15

## 2022-04-15 NOTE — PHYSICAL EXAM
[General Appearance - Alert] : alert [General Appearance - In No Acute Distress] : in no acute distress [General Appearance - Well Nourished] : well nourished [General Appearance - Well-Appearing] : healthy appearing [Sclera] : the sclera and conjunctiva were normal [Outer Ear] : the ears and nose were normal in appearance [Neck Appearance] : the appearance of the neck was normal [Respiration, Rhythm And Depth] : normal respiratory rhythm and effort [Heart Rate And Rhythm] : heart rate was normal and rhythm regular [Heart Sounds] : normal S1 and S2 [Edema] : there was no peripheral edema [Bowel Sounds] : normal bowel sounds [Abdomen Soft] : soft [No Palpable Adenopathy] : no palpable adenopathy [] : no rash [FreeTextEntry1] : Area of prior rash resolved [Oriented To Time, Place, And Person] : oriented to person, place, and time [Affect] : the affect was normal

## 2022-04-15 NOTE — ASSESSMENT
[FreeTextEntry1] : 67 yo M ESRD s/p renal transplant >10 years ago, DDRT,, VZV L arm in setting immunocompromised, hospitalized at [facility] now presents for follow up\par Prior episode of LUE shingles in hospital\par S/p course Valtrex\par Generally improved, rash improved, but now with LUE tingling sensation\par Suspect new post herpetic neuralgia\par Overall, Shingles, post herpetic neuralgia, immunocompromise\par - Start Gabapentin 300mg q 8, gradual increasing dose\par - Neurology referral for any assistance with post herpetic neuralgia possible\par - No need for further antivirals\par - RTC PRN

## 2022-04-15 NOTE — HISTORY OF PRESENT ILLNESS
[FreeTextEntry1] : 65 yo M ESRD s/p renal transplant >10 years ago, DDRT,, VZV L arm in setting immunocompromised, hospitalized at [facility] now presents for follow up\par S/p treatment course with valtrex\par Lesions resolved\par Complains of tingling/pain at prior lesion sites\par No fevers, no chills, no new complaints otherwise

## 2022-12-20 NOTE — ED ADULT NURSE NOTE - NSSUSCREENINGQ2_ED_ALL_ED
Phillip Villalobos MD  Performing Provider: Felix Garcia No

## 2023-03-15 NOTE — DISCHARGE NOTE NURSING/CASE MANAGEMENT/SOCIAL WORK - NSDCDPATPORTLINK_GEN_ALL_CORE
You can access the [facility] Portal, offered by [facility], by registering with the following website: [email or web address]

## 2023-04-26 NOTE — DISCHARGE NOTE PROVIDER - NSDCCPCAREPLAN_GEN_ALL_CORE_FT
PRINCIPAL DISCHARGE DIAGNOSIS  Diagnosis: Hypoglycemia  Assessment and Plan of Treatment: Medications were adjusted. Use Prodigi glucometer. 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 Follow up with PCP as above
0

## 2023-05-12 NOTE — DISCHARGE NOTE ADULT - INFLUENZA VACCINE DATE
[Sore Throat] : sore throat
[Postnasal Drip] : postnasal drip
[Dry Mouth] : dry mouth
[Cough] : cough
[Sputum] : sputum
[Negative] : Psychiatric
[Fever] : no fever
[Chills] : no chills
[Nasal Congestion] : no nasal congestion
[Sinus Problems] : no sinus problems
[Chest Tightness] : no chest tightness
[Pleuritic Pain] : no pleuritic pain
[Wheezing] : no wheezing
[Chest Discomfort] : no chest discomfort
[Edema] : no edema
[Seasonal Allergies] : no seasonal allergies
- - -
[GERD] : no gerd
[Rash] : no rash
october 2016

## 2024-08-02 NOTE — PATIENT PROFILE ADULT - PRO INTERPRETER NEED 2
Detail Level: Generalized
Detail Level: Detailed
Quality 224: Stage 0-Iic Melanoma: Overutilization Of Imaging Studies For Only Stage 0-Iic Melanoma: None of the following diagnostic imaging studies ordered: chest X-ray, CT, Ultrasound, MRI, PET, or nuclear medicine scans (ML)
Quality 138: Melanoma: Coordination Of Care: A treatment plan was communicated to the physicians providing continuing care within one month of diagnosis outlining: diagnosis, tumor thickness and a plan for surgery or alternate care.
Quality 137: Melanoma: Continuity Of Care - Recall System: Patient information entered into a recall system that includes: target date for the next exam specified AND a process to follow up with patients regarding missed or unscheduled appointments
Detail Level: Zone
English

## 2024-10-15 NOTE — DISCHARGE NOTE ADULT - CASE MANAGER'S NAME
What Type Of Note Output Would You Prefer (Optional)?: Bullet Format How Severe Is Your Rash?: moderate Is This A New Presentation, Or A Follow-Up?: Rash Yanci Anderson RN, [phone]